# Patient Record
Sex: FEMALE | Race: OTHER | Employment: UNEMPLOYED | URBAN - METROPOLITAN AREA
[De-identification: names, ages, dates, MRNs, and addresses within clinical notes are randomized per-mention and may not be internally consistent; named-entity substitution may affect disease eponyms.]

---

## 2017-10-19 ENCOUNTER — APPOINTMENT (OUTPATIENT)
Dept: GENERAL RADIOLOGY | Age: 77
End: 2017-10-19
Payer: COMMERCIAL

## 2017-10-19 ENCOUNTER — HOSPITAL ENCOUNTER (EMERGENCY)
Age: 77
Discharge: HOME | End: 2017-10-19
Attending: EMERGENCY MEDICINE
Payer: COMMERCIAL

## 2017-10-19 ENCOUNTER — APPOINTMENT (OUTPATIENT)
Dept: CT IMAGING | Age: 77
End: 2017-10-19
Payer: COMMERCIAL

## 2017-10-19 VITALS
WEIGHT: 145 LBS | TEMPERATURE: 98.8 F | RESPIRATION RATE: 16 BRPM | DIASTOLIC BLOOD PRESSURE: 69 MMHG | OXYGEN SATURATION: 99 % | BODY MASS INDEX: 26.52 KG/M2 | SYSTOLIC BLOOD PRESSURE: 138 MMHG | HEART RATE: 112 BPM

## 2017-10-19 DIAGNOSIS — R53.1 GENERAL WEAKNESS: Primary | ICD-10-CM

## 2017-10-19 LAB
ALBUMIN SERPL-MCNC: 4.5 G/DL (ref 3.9–4.9)
ALP BLD-CCNC: 92 U/L (ref 40–130)
ALT SERPL-CCNC: 80 U/L (ref 0–33)
ANION GAP SERPL CALCULATED.3IONS-SCNC: 16 MEQ/L (ref 7–13)
AST SERPL-CCNC: 149 U/L (ref 0–35)
BACTERIA: NORMAL /HPF
BASOPHILS ABSOLUTE: 0.1 K/UL (ref 0–0.2)
BASOPHILS RELATIVE PERCENT: 0.4 %
BILIRUB SERPL-MCNC: 0.6 MG/DL (ref 0–1.2)
BILIRUBIN URINE: NEGATIVE
BLOOD, URINE: NEGATIVE
BUN BLDV-MCNC: 16 MG/DL (ref 8–23)
CALCIUM SERPL-MCNC: 10.2 MG/DL (ref 8.6–10.2)
CHLORIDE BLD-SCNC: 99 MEQ/L (ref 98–107)
CLARITY: CLEAR
CO2: 26 MEQ/L (ref 22–29)
COLOR: YELLOW
CREAT SERPL-MCNC: 0.72 MG/DL (ref 0.5–0.9)
EKG ATRIAL RATE: 98 BPM
EKG P AXIS: 46 DEGREES
EKG P-R INTERVAL: 142 MS
EKG Q-T INTERVAL: 334 MS
EKG QRS DURATION: 80 MS
EKG QTC CALCULATION (BAZETT): 426 MS
EKG R AXIS: -5 DEGREES
EKG T AXIS: 52 DEGREES
EKG VENTRICULAR RATE: 98 BPM
EOSINOPHILS ABSOLUTE: 0 K/UL (ref 0–0.7)
EOSINOPHILS RELATIVE PERCENT: 0.1 %
EPITHELIAL CELLS, UA: NORMAL /HPF
GFR AFRICAN AMERICAN: >60
GFR NON-AFRICAN AMERICAN: >60
GLOBULIN: 3.4 G/DL (ref 2.3–3.5)
GLUCOSE BLD-MCNC: 210 MG/DL (ref 74–109)
GLUCOSE URINE: >=1000 MG/DL
HCT VFR BLD CALC: 46.1 % (ref 37–47)
HEMOGLOBIN: 14.9 G/DL (ref 12–16)
INR BLD: 1
KETONES, URINE: NEGATIVE MG/DL
LEUKOCYTE ESTERASE, URINE: ABNORMAL
LYMPHOCYTES ABSOLUTE: 0.7 K/UL (ref 1–4.8)
LYMPHOCYTES RELATIVE PERCENT: 5.3 %
MCH RBC QN AUTO: 29.7 PG (ref 27–31.3)
MCHC RBC AUTO-ENTMCNC: 32.4 % (ref 33–37)
MCV RBC AUTO: 91.6 FL (ref 82–100)
MONOCYTES ABSOLUTE: 0.9 K/UL (ref 0.2–0.8)
MONOCYTES RELATIVE PERCENT: 7.2 %
NEUTROPHILS ABSOLUTE: 10.9 K/UL (ref 1.4–6.5)
NEUTROPHILS RELATIVE PERCENT: 87 %
NITRITE, URINE: NEGATIVE
PDW BLD-RTO: 13.3 % (ref 11.5–14.5)
PH UA: 6.5 (ref 5–9)
PLATELET # BLD: 152 K/UL (ref 130–400)
POTASSIUM SERPL-SCNC: 4.1 MEQ/L (ref 3.5–5.1)
PROTEIN UA: NEGATIVE MG/DL
PROTHROMBIN TIME: 10.2 SEC (ref 8.1–13.7)
RAPID INFLUENZA  B AGN: NEGATIVE
RAPID INFLUENZA A AGN: NEGATIVE
RBC # BLD: 5.03 M/UL (ref 4.2–5.4)
RBC UA: NORMAL /HPF (ref 0–2)
SODIUM BLD-SCNC: 141 MEQ/L (ref 132–144)
SPECIFIC GRAVITY UA: 1.02 (ref 1–1.03)
TOTAL PROTEIN: 7.9 G/DL (ref 6.4–8.1)
UROBILINOGEN, URINE: 0.2 E.U./DL
WBC # BLD: 12.6 K/UL (ref 4.8–10.8)
WBC UA: NORMAL /HPF (ref 0–5)

## 2017-10-19 PROCEDURE — 85025 COMPLETE CBC W/AUTO DIFF WBC: CPT

## 2017-10-19 PROCEDURE — 86403 PARTICLE AGGLUT ANTBDY SCRN: CPT

## 2017-10-19 PROCEDURE — 93005 ELECTROCARDIOGRAM TRACING: CPT

## 2017-10-19 PROCEDURE — 2580000003 HC RX 258: Performed by: EMERGENCY MEDICINE

## 2017-10-19 PROCEDURE — 51701 INSERT BLADDER CATHETER: CPT

## 2017-10-19 PROCEDURE — 87086 URINE CULTURE/COLONY COUNT: CPT

## 2017-10-19 PROCEDURE — 99284 EMERGENCY DEPT VISIT MOD MDM: CPT

## 2017-10-19 PROCEDURE — 36415 COLL VENOUS BLD VENIPUNCTURE: CPT

## 2017-10-19 PROCEDURE — 85610 PROTHROMBIN TIME: CPT

## 2017-10-19 PROCEDURE — 71020 XR CHEST STANDARD TWO VW: CPT

## 2017-10-19 PROCEDURE — 81001 URINALYSIS AUTO W/SCOPE: CPT

## 2017-10-19 PROCEDURE — 80053 COMPREHEN METABOLIC PANEL: CPT

## 2017-10-19 PROCEDURE — 74176 CT ABD & PELVIS W/O CONTRAST: CPT

## 2017-10-19 RX ORDER — ASPIRIN 81 MG/1
81 TABLET ORAL DAILY
COMMUNITY
End: 2017-11-06 | Stop reason: SDUPTHER

## 2017-10-19 RX ORDER — TIZANIDINE 4 MG/1
4 TABLET ORAL DAILY
COMMUNITY
End: 2017-11-06 | Stop reason: SDUPTHER

## 2017-10-19 RX ORDER — DRONABINOL 2.5 MG/1
2.5 CAPSULE ORAL
COMMUNITY
End: 2017-11-06 | Stop reason: SDUPTHER

## 2017-10-19 RX ORDER — LEVOTHYROXINE SODIUM 0.1 MG/1
100 TABLET ORAL DAILY
Status: ON HOLD | COMMUNITY
End: 2017-11-03 | Stop reason: HOSPADM

## 2017-10-19 RX ORDER — CHOLESTYRAMINE 4 G/9G
1 POWDER, FOR SUSPENSION ORAL 3 TIMES DAILY
COMMUNITY
End: 2017-11-06 | Stop reason: SDUPTHER

## 2017-10-19 RX ORDER — FAMOTIDINE 20 MG/1
20 TABLET, FILM COATED ORAL 2 TIMES DAILY
Status: ON HOLD | COMMUNITY
End: 2017-11-03 | Stop reason: HOSPADM

## 2017-10-19 RX ORDER — DEXLANSOPRAZOLE 60 MG/1
60 CAPSULE, DELAYED RELEASE ORAL DAILY
Status: ON HOLD | COMMUNITY
End: 2017-11-03 | Stop reason: HOSPADM

## 2017-10-19 RX ORDER — ACETAMINOPHEN 160 MG
1 TABLET,DISINTEGRATING ORAL DAILY
COMMUNITY
End: 2017-11-06 | Stop reason: SDUPTHER

## 2017-10-19 RX ORDER — GABAPENTIN 300 MG/1
300 CAPSULE ORAL 3 TIMES DAILY
COMMUNITY
End: 2017-11-06 | Stop reason: SDUPTHER

## 2017-10-19 RX ORDER — BISACODYL 10 MG
10 SUPPOSITORY, RECTAL RECTAL DAILY PRN
COMMUNITY
End: 2017-11-06 | Stop reason: SDUPTHER

## 2017-10-19 RX ORDER — PRAMIPEXOLE DIHYDROCHLORIDE 0.5 MG/1
0.5 TABLET ORAL 3 TIMES DAILY
COMMUNITY
End: 2017-11-06 | Stop reason: SDUPTHER

## 2017-10-19 RX ORDER — MEPERIDINE HYDROCHLORIDE 100 MG/1
100 TABLET ORAL EVERY 4 HOURS PRN
COMMUNITY
End: 2017-11-06 | Stop reason: SDUPTHER

## 2017-10-19 RX ORDER — LACTULOSE 10 G/15ML
20 SOLUTION ORAL; RECTAL 3 TIMES DAILY
COMMUNITY
End: 2017-11-06 | Stop reason: SDUPTHER

## 2017-10-19 RX ORDER — SOLIFENACIN SUCCINATE 10 MG/1
5 TABLET, FILM COATED ORAL DAILY
COMMUNITY
End: 2017-11-06 | Stop reason: SDUPTHER

## 2017-10-19 RX ORDER — SUCRALFATE 1 G/1
1 TABLET ORAL 4 TIMES DAILY
COMMUNITY
End: 2017-11-06 | Stop reason: SDUPTHER

## 2017-10-19 RX ORDER — BUSPIRONE HYDROCHLORIDE 10 MG/1
10 TABLET ORAL DAILY
COMMUNITY
End: 2017-11-06 | Stop reason: SDUPTHER

## 2017-10-19 RX ORDER — 0.9 % SODIUM CHLORIDE 0.9 %
1000 INTRAVENOUS SOLUTION INTRAVENOUS ONCE
Status: COMPLETED | OUTPATIENT
Start: 2017-10-19 | End: 2017-10-19

## 2017-10-19 RX ORDER — ALBUTEROL SULFATE 2.5 MG/3ML
2.5 SOLUTION RESPIRATORY (INHALATION) EVERY 6 HOURS PRN
COMMUNITY
End: 2017-11-06 | Stop reason: SDUPTHER

## 2017-10-19 RX ORDER — DOCUSATE SODIUM 100 MG/1
100 CAPSULE, LIQUID FILLED ORAL DAILY
COMMUNITY
End: 2017-11-06 | Stop reason: SDUPTHER

## 2017-10-19 RX ORDER — OMEPRAZOLE 20 MG/1
40 CAPSULE, DELAYED RELEASE ORAL DAILY
COMMUNITY
End: 2017-11-06 | Stop reason: SDUPTHER

## 2017-10-19 RX ORDER — ALPRAZOLAM 0.5 MG/1
0.5 TABLET ORAL 3 TIMES DAILY PRN
COMMUNITY
End: 2017-11-06 | Stop reason: SDUPTHER

## 2017-10-19 RX ORDER — ALBUTEROL SULFATE 90 UG/1
2 AEROSOL, METERED RESPIRATORY (INHALATION) EVERY 4 HOURS PRN
COMMUNITY
End: 2017-11-06 | Stop reason: SDUPTHER

## 2017-10-19 RX ADMIN — SODIUM CHLORIDE 1000 ML: 9 INJECTION, SOLUTION INTRAVENOUS at 12:36

## 2017-10-19 ASSESSMENT — ENCOUNTER SYMPTOMS
VOMITING: 0
ABDOMINAL PAIN: 0
DIARRHEA: 0
SHORTNESS OF BREATH: 0
SORE THROAT: 0
BACK PAIN: 0
NAUSEA: 0

## 2017-10-19 NOTE — ED NOTES
Bed: 20  Expected date: 10/19/17  Expected time: 11:45 AM  Means of arrival:   Comments:  77 f pain all over, Telugu speaking     Carly Hopper RN  10/19/17 [id number]

## 2017-10-19 NOTE — CARE COORDINATION
DISCUSSED WITH SON WHO IS ENGLISH-SPEAKING, THAT  HAD ORDERED C. PT IS CURRENTLY RECEIVING CARE THROUGH [facility]. NO ANSWER WHEN I CALLED THE [facility] OFFICE, WILL FAX CHART WITH ORDER.

## 2017-10-19 NOTE — ED NOTES
I spoke with the patient to see what brought her in to the ER. The patient is French speaking only and whispers her answers. Patient is alert and is able to give me her date of birth and explain her complaint. Patient states she has not been out of bed since Friday and states she has chronic leg pain and that it has increased over the last couple of days. The patients  and another family member walked in and I was able to speak with them, who is also French speaking. The  states that on Oct 12, 2017, they took the patient to her family doctor for an appointment and she was prescribed a new parkinsons medication. The  states the patient began the new medication on Oct 13th, and he stopped giving it to her on Oct. 16th due to the patient not being able to get out of bed, increased tremors and the way the patient was whispering. He did state that she has not been out of bed since Friday like the patient initially said.       Silvia Wallace RN  10/19/17 [id number]

## 2017-10-19 NOTE — ED PROVIDER NOTES
Negative for rash. Neurological: Negative for dizziness, weakness and headaches. Hematological: Negative for adenopathy. Psychiatric/Behavioral: Negative for confusion. All other systems reviewed and are negative. Except as noted above the remainder of the review of systems was reviewed and negative. PAST MEDICAL HISTORY     Past Medical History:   Diagnosis Date    Depression     Fatigue     Fibromyalgia     Hyperlipidemia     Hypertension     Hypothyroid     Levodopa-induced dyskinesia     Lumbago     Muscular wasting and disuse atrophy     Myalgia     Osteoarthritis     Paralysis agitans ([address]     Parkinson's disease ([address]     Spinal stenosis     Thyroid disease     Urinary frequency        SURGICAL HISTORY     History reviewed. No pertinent surgical history.     CURRENT MEDICATIONS       Discharge Medication List as of 10/19/2017  5:53 PM      CONTINUE these medications which have NOT CHANGED    Details   ALPRAZolam (XANAX) 0.5 MG tablet Take 0.5 mg by mouth 3 times daily as needed for SleepHistorical Med      CALCIUM CARBONATE PO Take by mouthHistorical Med      busPIRone (BUSPAR) 10 MG tablet Take 10 mg by mouth dailyHistorical Med      gabapentin (NEURONTIN) 300 MG capsule Take 300 mg by mouth 3 times dailyHistorical Med      tiZANidine (ZANAFLEX) 4 MG tablet Take 4 mg by mouth dailyHistorical Med      levothyroxine (SYNTHROID) 100 MCG tablet Take 100 mcg by mouth dailyHistorical Med      docusate sodium (COLACE) 100 MG capsule Take 100 mg by mouth dailyHistorical Med      diltiazem (CARDIZEM) 30 MG tablet Take 30 mg by mouth 3 times dailyHistorical Med      albuterol sulfate  (90 Base) MCG/ACT inhaler Inhale 2 puffs into the lungs every 4 hours as needed for WheezingHistorical Med      albuterol (PROVENTIL) (2.5 MG/3ML) 0.083% nebulizer solution Take 2.5 mg by nebulization every 6 hours as needed for WheezingHistorical Med      pramipexole (MIRAPEX) 0.5 MG tablet Take activity: Not Asked     Other Topics Concern    None     Social History Narrative    None       SCREENINGS           PHYSICAL EXAM    (up to 7 for level 4, 8 or more for level 5)     ED Triage Vitals [10/19/17 1200]   BP Temp Temp Source Pulse Resp SpO2 Height Weight   138/69 98.8 °F (37.1 °C) Temporal 112 16 99 % -- 145 lb (65.8 kg)       Physical Exam   Constitutional: She is oriented to person, place, and time. She appears well-developed and well-nourished. No distress. HENT:   Head: Normocephalic and atraumatic. Nose: Nose normal.   Mouth/Throat: No oropharyngeal exudate. Eyes: Conjunctivae are normal. Pupils are equal, round, and reactive to light. Right eye exhibits no discharge. Left eye exhibits no discharge. Neck: Normal range of motion. Neck supple. Cardiovascular: Normal rate, regular rhythm and normal heart sounds. Exam reveals no friction rub. No murmur heard. Pulmonary/Chest: Effort normal and breath sounds normal. No stridor. No respiratory distress. She has no wheezes. Abdominal: Soft. Bowel sounds are normal. She exhibits no distension. There is no rebound and no guarding. Musculoskeletal: Normal range of motion. She exhibits no edema or tenderness. Lymphadenopathy:     She has no cervical adenopathy. Neurological: She is alert and oriented to person, place, and time. She displays normal reflexes. No cranial nerve deficit. Coordination normal.   Skin: Skin is warm and dry. No rash noted. Psychiatric: She has a normal mood and affect. Her behavior is normal. Thought content normal.   Nursing note and vitals reviewed. DIAGNOSTIC RESULTS     EKG: All EKG's are interpreted by the Emergency Department Physician who either signs or Co-signs this chart in the absence of a cardiologist.    Sinus rhythm at 98. Computer reads as acute MI but I disagree. No ST elevation. Normal CT interval.  No acute changes.     RADIOLOGY:   Non-plain film images such as CT, Ultrasound and TempSrc: Temporal   SpO2: 99%   Weight: 145 lb (65.8 kg)       MDM  Number of Diagnoses or Management Options  General weakness:     Labs are basically within normal limits except for mild leukocytosis. Chest x-ray is clear. CT abdomen pelvis negative for acute process. Urine does not look infected. She was given IV fluids and observed. I spoke at length with the patient's family using interpretation service as well as with her son who is fluent in [place]. She feels much better. She feels strongly about going home and wants to be in her own house. I discussed the case with the neurologist that was on call for her neurologist who will help arrange close follow-up for possible medication adjustment. She will return to the emergency department if she has any symptoms. CRITICAL CARE TIME     Total Critical Care time (not applicable if blank)     Total minutes, excluding separately reportable procedures. There was a high probability of clinically significant/life threatening deterioration in the patient's condition which required my urgent intervention. This includes discussing the case with consultants, reviewing laboratory studies and images independently, arranging disposition, and speaking with patient/family    CONSULTS:  None    PROCEDURES:  Unless otherwise noted below, none     Procedures    FINAL IMPRESSION      1.  General weakness          DISPOSITION/PLAN   DISPOSITION Decision to Discharge    PATIENT REFERRED TO:  Thomas Real DO  [facility] [phone]      ASAP      DISCHARGE MEDICATIONS:  Discharge Medication List as of 10/19/2017  5:53 PM             (Please note that portions of this note were completed with a voice recognition program.  Efforts were made to edit the dictations but occasionally words and phrases are mis-transcribed.)    Naomi Mayer MD (electronically signed)  Attending Emergency Physician              Emelia Guerra,

## 2017-10-20 PROCEDURE — 93010 ELECTROCARDIOGRAM REPORT: CPT | Performed by: INTERNAL MEDICINE

## 2017-10-21 LAB — URINE CULTURE, ROUTINE: NORMAL

## 2017-10-26 ENCOUNTER — APPOINTMENT (OUTPATIENT)
Dept: CT IMAGING | Age: 77
DRG: 871 | End: 2017-10-26
Payer: COMMERCIAL

## 2017-10-26 ENCOUNTER — APPOINTMENT (OUTPATIENT)
Dept: GENERAL RADIOLOGY | Age: 77
DRG: 871 | End: 2017-10-26
Payer: COMMERCIAL

## 2017-10-26 ENCOUNTER — HOSPITAL ENCOUNTER (INPATIENT)
Age: 77
LOS: 8 days | Discharge: NURSING, REHAB OR LONG-TERM CARE | DRG: 871 | End: 2017-11-03
Attending: INTERNAL MEDICINE | Admitting: INTERNAL MEDICINE
Payer: COMMERCIAL

## 2017-10-26 ENCOUNTER — APPOINTMENT (OUTPATIENT)
Dept: ULTRASOUND IMAGING | Age: 77
DRG: 871 | End: 2017-10-26
Payer: COMMERCIAL

## 2017-10-26 DIAGNOSIS — N17.9 ACUTE RENAL FAILURE, UNSPECIFIED ACUTE RENAL FAILURE TYPE (HCC): ICD-10-CM

## 2017-10-26 DIAGNOSIS — R33.9 URINARY RETENTION: ICD-10-CM

## 2017-10-26 DIAGNOSIS — A41.9 SEPSIS, DUE TO UNSPECIFIED ORGANISM: ICD-10-CM

## 2017-10-26 DIAGNOSIS — R41.82 ALTERED MENTAL STATUS, UNSPECIFIED ALTERED MENTAL STATUS TYPE: Primary | ICD-10-CM

## 2017-10-26 LAB
ALBUMIN SERPL-MCNC: 3.8 G/DL (ref 3.9–4.9)
ALP BLD-CCNC: 157 U/L (ref 40–130)
ALT SERPL-CCNC: 24 U/L (ref 0–33)
ANION GAP SERPL CALCULATED.3IONS-SCNC: 24 MEQ/L (ref 7–13)
AST SERPL-CCNC: 22 U/L (ref 0–35)
BACTERIA: ABNORMAL /HPF
BASE EXCESS ARTERIAL: 0 (ref -3–3)
BASOPHILS ABSOLUTE: 0.1 K/UL (ref 0–0.2)
BASOPHILS RELATIVE PERCENT: 0.3 %
BILIRUB SERPL-MCNC: 0.4 MG/DL (ref 0–1.2)
BILIRUBIN URINE: NEGATIVE
BLOOD, URINE: NEGATIVE
BUN BLDV-MCNC: 123 MG/DL (ref 8–23)
CALCIUM SERPL-MCNC: 10.7 MG/DL (ref 8.6–10.2)
CHLORIDE BLD-SCNC: 98 MEQ/L (ref 98–107)
CLARITY: ABNORMAL
CO2: 21 MEQ/L (ref 22–29)
COLOR: YELLOW
CREAT SERPL-MCNC: 6.87 MG/DL (ref 0.5–0.9)
EOSINOPHILS ABSOLUTE: 0 K/UL (ref 0–0.7)
EOSINOPHILS RELATIVE PERCENT: 0 %
EPITHELIAL CELLS, UA: ABNORMAL /HPF
GFR AFRICAN AMERICAN: 6
GFR AFRICAN AMERICAN: 7.1
GFR NON-AFRICAN AMERICAN: 5
GFR NON-AFRICAN AMERICAN: 5.8
GLOBULIN: 3.7 G/DL (ref 2.3–3.5)
GLUCOSE BLD-MCNC: 179 MG/DL (ref 74–109)
GLUCOSE URINE: NEGATIVE MG/DL
HCO3 ARTERIAL: 24.7 MMOL/L (ref 21–29)
HCT VFR BLD CALC: 42.9 % (ref 37–47)
HEMOGLOBIN: 13.8 G/DL (ref 12–16)
INR BLD: 1
KETONES, URINE: NEGATIVE MG/DL
LACTATE: 1.09 MMOL/L (ref 0.4–2)
LACTIC ACID: 1.3 MMOL/L (ref 0.5–2.2)
LEUKOCYTE ESTERASE, URINE: ABNORMAL
LYMPHOCYTES ABSOLUTE: 1 K/UL (ref 1–4.8)
LYMPHOCYTES RELATIVE PERCENT: 5.5 %
MCH RBC QN AUTO: 29.2 PG (ref 27–31.3)
MCHC RBC AUTO-ENTMCNC: 32.2 % (ref 33–37)
MCV RBC AUTO: 90.8 FL (ref 82–100)
MONOCYTES ABSOLUTE: 1.5 K/UL (ref 0.2–0.8)
MONOCYTES RELATIVE PERCENT: 8 %
NEUTROPHILS ABSOLUTE: 16.2 K/UL (ref 1.4–6.5)
NEUTROPHILS RELATIVE PERCENT: 86.2 %
NITRITE, URINE: NEGATIVE
O2 SAT, ARTERIAL: 94 % (ref 93–100)
PCO2 ARTERIAL: 38 MM HG (ref 35–45)
PDW BLD-RTO: 13.5 % (ref 11.5–14.5)
PERFORMED ON: ABNORMAL
PERFORMED ON: ABNORMAL
PH ARTERIAL: 7.42 (ref 7.35–7.45)
PH UA: 5.5 (ref 5–9)
PLATELET # BLD: 209 K/UL (ref 130–400)
PO2 ARTERIAL: 68 MM HG (ref 75–108)
POC CREATININE WHOLE BLOOD: 7.4
POC CREATININE: 7.4 MG/DL (ref 0.6–1.2)
POC SAMPLE TYPE: ABNORMAL
POC SAMPLE TYPE: ABNORMAL
POTASSIUM SERPL-SCNC: 5.6 MEQ/L (ref 3.5–5.1)
PROTEIN UA: NEGATIVE MG/DL
PROTHROMBIN TIME: 10.9 SEC (ref 8.1–13.7)
RBC # BLD: 4.73 M/UL (ref 4.2–5.4)
RBC UA: ABNORMAL /HPF (ref 0–2)
SODIUM BLD-SCNC: 143 MEQ/L (ref 132–144)
SPECIFIC GRAVITY UA: 1.02 (ref 1–1.03)
TCO2 ARTERIAL: 26 (ref 22–29)
TOTAL CK: 38 U/L (ref 0–170)
TOTAL PROTEIN: 7.5 G/DL (ref 6.4–8.1)
TROPONIN: <0.01 NG/ML (ref 0–0.01)
UROBILINOGEN, URINE: 0.2 E.U./DL
WBC # BLD: 18.7 K/UL (ref 4.8–10.8)
WBC UA: ABNORMAL /HPF (ref 0–5)

## 2017-10-26 PROCEDURE — 6360000002 HC RX W HCPCS: Performed by: PHYSICIAN ASSISTANT

## 2017-10-26 PROCEDURE — 6360000002 HC RX W HCPCS: Performed by: NURSE PRACTITIONER

## 2017-10-26 PROCEDURE — 85025 COMPLETE CBC W/AUTO DIFF WBC: CPT

## 2017-10-26 PROCEDURE — 87040 BLOOD CULTURE FOR BACTERIA: CPT

## 2017-10-26 PROCEDURE — 80053 COMPREHEN METABOLIC PANEL: CPT

## 2017-10-26 PROCEDURE — 76775 US EXAM ABDO BACK WALL LIM: CPT

## 2017-10-26 PROCEDURE — 96365 THER/PROPH/DIAG IV INF INIT: CPT

## 2017-10-26 PROCEDURE — 99285 EMERGENCY DEPT VISIT HI MDM: CPT

## 2017-10-26 PROCEDURE — 82803 BLOOD GASES ANY COMBINATION: CPT

## 2017-10-26 PROCEDURE — 1210000000 HC MED SURG R&B

## 2017-10-26 PROCEDURE — 84484 ASSAY OF TROPONIN QUANT: CPT

## 2017-10-26 PROCEDURE — 70450 CT HEAD/BRAIN W/O DYE: CPT

## 2017-10-26 PROCEDURE — 2580000003 HC RX 258: Performed by: NURSE PRACTITIONER

## 2017-10-26 PROCEDURE — 83605 ASSAY OF LACTIC ACID: CPT

## 2017-10-26 PROCEDURE — 93005 ELECTROCARDIOGRAM TRACING: CPT

## 2017-10-26 PROCEDURE — 2580000003 HC RX 258: Performed by: PHYSICIAN ASSISTANT

## 2017-10-26 PROCEDURE — 96368 THER/DIAG CONCURRENT INF: CPT

## 2017-10-26 PROCEDURE — 85610 PROTHROMBIN TIME: CPT

## 2017-10-26 PROCEDURE — 81001 URINALYSIS AUTO W/SCOPE: CPT

## 2017-10-26 PROCEDURE — 36600 WITHDRAWAL OF ARTERIAL BLOOD: CPT

## 2017-10-26 PROCEDURE — 96375 TX/PRO/DX INJ NEW DRUG ADDON: CPT

## 2017-10-26 PROCEDURE — 71010 XR CHEST PORTABLE: CPT

## 2017-10-26 PROCEDURE — 36415 COLL VENOUS BLD VENIPUNCTURE: CPT

## 2017-10-26 PROCEDURE — 82550 ASSAY OF CK (CPK): CPT

## 2017-10-26 PROCEDURE — 82565 ASSAY OF CREATININE: CPT

## 2017-10-26 PROCEDURE — 6370000000 HC RX 637 (ALT 250 FOR IP): Performed by: NURSE PRACTITIONER

## 2017-10-26 PROCEDURE — 74176 CT ABD & PELVIS W/O CONTRAST: CPT

## 2017-10-26 RX ORDER — SODIUM CHLORIDE 0.9 % (FLUSH) 0.9 %
10 SYRINGE (ML) INJECTION PRN
Status: DISCONTINUED | OUTPATIENT
Start: 2017-10-26 | End: 2017-11-03 | Stop reason: HOSPADM

## 2017-10-26 RX ORDER — DEXTROSE MONOHYDRATE 25 G/50ML
25 INJECTION, SOLUTION INTRAVENOUS ONCE
Status: COMPLETED | OUTPATIENT
Start: 2017-10-26 | End: 2017-10-26

## 2017-10-26 RX ORDER — SODIUM CHLORIDE 0.9 % (FLUSH) 0.9 %
10 SYRINGE (ML) INJECTION EVERY 12 HOURS SCHEDULED
Status: DISCONTINUED | OUTPATIENT
Start: 2017-10-26 | End: 2017-11-03 | Stop reason: HOSPADM

## 2017-10-26 RX ORDER — CIPROFLOXACIN 2 MG/ML
400 INJECTION, SOLUTION INTRAVENOUS EVERY 24 HOURS
Status: DISCONTINUED | OUTPATIENT
Start: 2017-10-26 | End: 2017-10-28

## 2017-10-26 RX ORDER — LEVOTHYROXINE SODIUM 0.1 MG/1
100 TABLET ORAL DAILY
Status: DISCONTINUED | OUTPATIENT
Start: 2017-10-26 | End: 2017-10-28

## 2017-10-26 RX ORDER — ACETAMINOPHEN 325 MG/1
650 TABLET ORAL EVERY 4 HOURS PRN
Status: DISCONTINUED | OUTPATIENT
Start: 2017-10-26 | End: 2017-11-03 | Stop reason: HOSPADM

## 2017-10-26 RX ORDER — SODIUM CHLORIDE 9 MG/ML
INJECTION, SOLUTION INTRAVENOUS CONTINUOUS
Status: DISCONTINUED | OUTPATIENT
Start: 2017-10-26 | End: 2017-10-27

## 2017-10-26 RX ORDER — ASPIRIN 81 MG/1
81 TABLET ORAL DAILY
Status: DISCONTINUED | OUTPATIENT
Start: 2017-10-26 | End: 2017-11-03 | Stop reason: HOSPADM

## 2017-10-26 RX ORDER — ACETAMINOPHEN 650 MG/1
650 SUPPOSITORY RECTAL ONCE
Status: COMPLETED | OUTPATIENT
Start: 2017-10-26 | End: 2017-10-26

## 2017-10-26 RX ORDER — 0.9 % SODIUM CHLORIDE 0.9 %
30 INTRAVENOUS SOLUTION INTRAVENOUS ONCE
Status: COMPLETED | OUTPATIENT
Start: 2017-10-26 | End: 2017-10-26

## 2017-10-26 RX ORDER — SODIUM CHLORIDE 9 MG/ML
INJECTION, SOLUTION INTRAVENOUS CONTINUOUS
Status: DISCONTINUED | OUTPATIENT
Start: 2017-10-26 | End: 2017-10-26

## 2017-10-26 RX ORDER — PAROXETINE HYDROCHLORIDE 40 MG/1
40 TABLET, FILM COATED ORAL EVERY MORNING
COMMUNITY
End: 2017-11-06 | Stop reason: SDUPTHER

## 2017-10-26 RX ORDER — ONDANSETRON 2 MG/ML
4 INJECTION INTRAMUSCULAR; INTRAVENOUS EVERY 6 HOURS PRN
Status: DISCONTINUED | OUTPATIENT
Start: 2017-10-26 | End: 2017-11-03 | Stop reason: HOSPADM

## 2017-10-26 RX ORDER — PAROXETINE HYDROCHLORIDE 20 MG/1
40 TABLET, FILM COATED ORAL EVERY MORNING
Status: DISCONTINUED | OUTPATIENT
Start: 2017-10-27 | End: 2017-11-03 | Stop reason: HOSPADM

## 2017-10-26 RX ADMIN — INSULIN HUMAN 10 UNITS: 100 INJECTION, SOLUTION PARENTERAL at 14:50

## 2017-10-26 RX ADMIN — SODIUM CHLORIDE 2040 ML: 9 INJECTION, SOLUTION INTRAVENOUS at 12:57

## 2017-10-26 RX ADMIN — SODIUM CHLORIDE: 9 INJECTION, SOLUTION INTRAVENOUS at 16:36

## 2017-10-26 RX ADMIN — AZITHROMYCIN MONOHYDRATE 500 MG: 500 INJECTION, POWDER, LYOPHILIZED, FOR SOLUTION INTRAVENOUS at 14:32

## 2017-10-26 RX ADMIN — CEFTRIAXONE 1 G: 1 INJECTION, POWDER, FOR SOLUTION INTRAMUSCULAR; INTRAVENOUS at 12:57

## 2017-10-26 RX ADMIN — CALCIUM GLUCONATE 1 G: 94 INJECTION, SOLUTION INTRAVENOUS at 14:50

## 2017-10-26 RX ADMIN — ACETAMINOPHEN 650 MG: 650 SUPPOSITORY RECTAL at 13:09

## 2017-10-26 RX ADMIN — DEXTROSE MONOHYDRATE 25 G: 25 INJECTION, SOLUTION INTRAVENOUS at 14:49

## 2017-10-26 RX ADMIN — CIPROFLOXACIN 400 MG: 2 INJECTION, SOLUTION INTRAVENOUS at 20:23

## 2017-10-26 ASSESSMENT — PAIN SCALES - GENERAL
PAINLEVEL_OUTOF10: 0
PAINLEVEL_OUTOF10: 0

## 2017-10-26 NOTE — H&P
(COLACE) 100 MG capsule Take 100 mg by mouth daily   Yes Historical Provider, MD   diltiazem (CARDIZEM) 30 MG tablet Take 30 mg by mouth 3 times daily   Yes Historical Provider, MD   solifenacin (VESICARE) 10 MG tablet Take 5 mg by mouth daily   Yes Historical Provider, MD   dexlansoprazole (DEXILANT) 60 MG CPDR delayed release capsule Take 60 mg by mouth daily   Yes Historical Provider, MD   linaclotide (LINZESS) 290 MCG CAPS capsule Take 290 mcg by mouth every morning (before breakfast)   Yes Historical Provider, MD   aspirin 81 MG EC tablet Take 81 mg by mouth daily   Yes Historical Provider, MD   Cholecalciferol (VITAMIN D3) 2000 units CAPS Take 1 capsule by mouth daily   Yes Historical Provider, MD   carbidopa-levodopa (SINEMET)  MG per tablet Take 1 tablet by mouth 3 times daily   Yes Historical Provider, MD   busPIRone (BUSPAR) 10 MG tablet Take 10 mg by mouth daily    Historical Provider, MD   albuterol sulfate  (90 Base) MCG/ACT inhaler Inhale 2 puffs into the lungs every 4 hours as needed for Wheezing    Historical Provider, MD   albuterol (PROVENTIL) (2.5 MG/3ML) 0.083% nebulizer solution Take 2.5 mg by nebulization every 6 hours as needed for Wheezing    Historical Provider, MD   pramipexole (MIRAPEX) 0.5 MG tablet Take 0.5 mg by mouth 3 times daily     Historical Provider, MD   bisacodyl (DULCOLAX) 10 MG suppository Place 10 mg rectally daily as needed for Constipation    Historical Provider, MD   omeprazole (PRILOSEC) 20 MG delayed release capsule Take 40 mg by mouth daily    Historical Provider, MD   sucralfate (CARAFATE) 1 GM tablet Take 1 g by mouth 4 times daily    Historical Provider, MD   meperidine (DEMEROL) 100 MG tablet Take 100 mg by mouth every 4 hours as needed for Pain .     Historical Provider, MD   dronabinol (MARINOL) 2.5 MG capsule Take 2.5 mg by mouth 2 times daily (before meals)    Historical Provider, MD   cholestyramine (QUESTRAN) 4 g packet Take 1 packet by mouth 3 PLT  209     Recent Labs      10/26/17   1237  10/26/17   1320   NA  143   --    K  5.6*   --    CL  98   --    CO2  21*   --    BUN  123*   --    CREATININE  6.87*  7.4*   CALCIUM  10.7*   --      Recent Labs      10/26/17   1237   AST  22   ALT  24   BILITOT  0.4   ALKPHOS  157*     Recent Labs      10/26/17   1237   INR  1.0     Recent Labs      10/26/17   1237   CKTOTAL  38   TROPONINI  <0.010       Urinalysis:      Lab Results   Component Value Date    NITRU Negative 10/26/2017    WBCUA 6-10 10/26/2017    BACTERIA Moderate 10/26/2017    RBCUA 3-5 10/26/2017    BLOODU Negative 10/26/2017    SPECGRAV 1.018 10/26/2017    GLUCOSEU Negative 10/26/2017       Radiology:     CXR: I have reviewed the CXR with the following interpretation: pending  EKG:  I have reviewed the EKG with the following interpretation: pending    XR Chest Portable   Preliminary Result   SHALLOW INSPIRATORY PORTABLE CHEST RADIOGRAPH. MODERATE RIGHT HEMIDIAPHRAGMATIC ELEVATION AND RIGHT BASILAR ATELECTASIS. CT ABDOMEN PELVIS WO IV CONTRAST Additional Contrast? None   Final Result      Right lower lobe pneumonia with left lower lobe subsegmental atelectasis/pneumonia. Small to moderate hiatal hernia. Colonic diverticulosis with no CT evidence diverticulitis. Air within urinary bladder most likely secondary to instrumentation/Trevizo catheter placement. All CT scans at this facility use dose modulation, iterative reconstruction, and/or weight based dosing when appropriate to reduce radiation dose to as low as reasonably achievable. CT head without contrast   Final Result      NO ACUTE INTRACRANIAL PROCESS, OR SIGNIFICANT CHANGE FROM 9/21/2015 IDENTIFIED. ASSESSMENT:    There are no active hospital problems to display for this patient. PLAN:        DVT Prophylaxis: lovenox  Diet:    Code Status: No Order    PT/OT Eval Status: eval and tx    1.  Altered mental status-2 to infectious

## 2017-10-26 NOTE — ED TRIAGE NOTES
Pt sent in from home by visiting physicians for fever, ams, and possible urine retention, pt is drowsy , arouses easily to verbal stimuli, oriented x self, breathes are equal and unlabored, family at bedside.

## 2017-10-26 NOTE — ED PROVIDER NOTES
every 4 hours as needed for Wheezing    ALPRAZOLAM (XANAX) 0.5 MG TABLET    Take 0.5 mg by mouth 3 times daily as needed for Sleep    ASPIRIN 81 MG EC TABLET    Take 81 mg by mouth daily    BISACODYL (DULCOLAX) 10 MG SUPPOSITORY    Place 10 mg rectally daily as needed for Constipation    BUSPIRONE (BUSPAR) 10 MG TABLET    Take 10 mg by mouth daily    CALCIUM CARBONATE PO    Take by mouth    CARBIDOPA-LEVODOPA (SINEMET)  MG PER TABLET    Take 1 tablet by mouth 3 times daily    CHOLECALCIFEROL (VITAMIN D3) 2000 UNITS CAPS    Take 1 capsule by mouth daily    CHOLESTYRAMINE (QUESTRAN) 4 G PACKET    Take 1 packet by mouth 3 times daily    DEXLANSOPRAZOLE (DEXILANT) 60 MG CPDR DELAYED RELEASE CAPSULE    Take 60 mg by mouth daily    DILTIAZEM (CARDIZEM) 30 MG TABLET    Take 30 mg by mouth 3 times daily    DOCUSATE SODIUM (COLACE) 100 MG CAPSULE    Take 100 mg by mouth daily    DRONABINOL (MARINOL) 2.5 MG CAPSULE    Take 2.5 mg by mouth 2 times daily (before meals)    FAMOTIDINE (PEPCID) 20 MG TABLET    Take 20 mg by mouth 2 times daily    GABAPENTIN (NEURONTIN) 300 MG CAPSULE    Take 300 mg by mouth 3 times daily    LACTULOSE ENCEPHALOPATHY 10 GM/15ML SOLN SOLUTION    Take 20 g by mouth 3 times daily    LEVOTHYROXINE (SYNTHROID) 100 MCG TABLET    Take 100 mcg by mouth daily    LINACLOTIDE (LINZESS) 290 MCG CAPS CAPSULE    Take 290 mcg by mouth every morning (before breakfast)    MEPERIDINE (DEMEROL) 100 MG TABLET    Take 100 mg by mouth every 4 hours as needed for Pain .     NALOXEGOL (MOVANTIK) 25 MG TABS TABLET    Take 25 mg by mouth every morning    OMEPRAZOLE (PRILOSEC) 20 MG DELAYED RELEASE CAPSULE    Take 40 mg by mouth daily    PAROXETINE (PAXIL) 40 MG TABLET    Take 40 mg by mouth every morning    PRAMIPEXOLE (MIRAPEX) 0.5 MG TABLET    Take 0.5 mg by mouth 3 times daily     SOLIFENACIN (VESICARE) 10 MG TABLET    Take 5 mg by mouth daily    SUCRALFATE (CARAFATE) 1 GM TABLET    Take 1 g by mouth 4 times daily TIZANIDINE (ZANAFLEX) 4 MG TABLET    Take 4 mg by mouth daily       ALLERGIES     Latex; Penicillins; and Vancomycin    FAMILY HISTORY     History reviewed. No pertinent family history. SOCIAL HISTORY       Social History     Social History    Marital status:      Spouse name: N/A    Number of children: N/A    Years of education: N/A     Social History Main Topics    Smoking status: Never Smoker    Smokeless tobacco: Never Used    Alcohol use No    Drug use: No    Sexual activity: Not Asked     Other Topics Concern    None     Social History Narrative    None       SCREENINGS             PHYSICAL EXAM    (up to 7 for level 4, 8 or more for level 5)     ED Triage Vitals Sinus tachycardia    BP Temp Temp Source Pulse Resp SpO2 Height Weight   (!) 134/92 101.8 °F (38.8 °C) Oral 115 17 93 % -- 150 lb (68 kg)       Physical Exam   Constitutional: She appears well-developed and well-nourished. She appears listless. HENT:   Head: Normocephalic and atraumatic. Eyes: Conjunctivae are normal.   Neck: Neck supple. Cardiovascular: Regular rhythm, normal heart sounds and intact distal pulses. Tachycardia present. Abdominal: There is tenderness. Patient is responsive to voice but does not answer questions, she does grimace on examination of abdomen. Positive for distended bladder. Neurological: She appears listless. Skin: Skin is warm and dry. DIAGNOSTIC RESULTS     EKG: All EKG's are interpreted by the Emergency Department Physician who either signs or Co-signs this chart in the absence of a cardiologist.    Sinus tachycardia at 119 bpm.  No acute ST segment elevation. RADIOLOGY:    Non-plain film images such as CT, Ultrasound and MRI are read by the radiologist. Plain radiographic images are visualized and preliminarily interpreted by the emergency physician with the below findings:    Chest X-Ray demonstrates no acute infiltrate, effusion or pneumothorax.     Interpretation per the Radiologist below, if available at the time of this note:    CT ABDOMEN PELVIS WO IV CONTRAST Additional Contrast? None   Final Result      Right lower lobe pneumonia with left lower lobe subsegmental atelectasis/pneumonia. Small to moderate hiatal hernia. Colonic diverticulosis with no CT evidence diverticulitis. Air within urinary bladder most likely secondary to instrumentation/Trevizo catheter placement. All CT scans at this facility use dose modulation, iterative reconstruction, and/or weight based dosing when appropriate to reduce radiation dose to as low as reasonably achievable. CT head without contrast   Final Result      NO ACUTE INTRACRANIAL PROCESS, OR SIGNIFICANT CHANGE FROM 9/21/2015 IDENTIFIED.                   XR Chest Portable    (Results Pending)         ED BEDSIDE ULTRASOUND:   Performed by ED Physician - none    LABS:  Labs Reviewed   CBC WITH AUTO DIFFERENTIAL - Abnormal; Notable for the following:        Result Value    WBC 18.7 (*)     MCHC 32.2 (*)     Neutrophils # 16.2 (*)     Monocytes # 1.5 (*)     All other components within normal limits   COMPREHENSIVE METABOLIC PANEL - Abnormal; Notable for the following:     Potassium 5.6 (*)     CO2 21 (*)     Anion Gap 24 (*)     Glucose 179 (*)      (*)     CREATININE 6.87 (*)     GFR Non- 5.8 (*)     GFR  7.1 (*)     Calcium 10.7 (*)     Alb 3.8 (*)     Alkaline Phosphatase 157 (*)     Globulin 3.7 (*)     All other components within normal limits    Narrative:     CALL  [facility] tel. [id number],  Creat called to Dr. Yasmany Zuñiga, 10/26/2017 13:16, by Dionne Standing  BUN called to Dr. Yasmany Zuñiga, 10/26/2017 13:16, by Dionne Standing   URINALYSIS - Abnormal; Notable for the following:     Clarity, UA CLOUDY (*)     Leukocyte Esterase, Urine TRACE (*)     All other components within normal limits   MICROSCOPIC URINALYSIS - Abnormal; Notable for the following:     WBC, UA 6-10 (*)     RBC, UA 3-5

## 2017-10-26 NOTE — ED NOTES
Bed: 03  Expected date: 10/26/17  Expected time: 11:59 AM  Means of arrival: [facility]  Comments:  78year old female urinary retention.      Fany Ott RN  10/26/17 [id number]

## 2017-10-27 PROBLEM — R33.9 URINARY RETENTION: Status: ACTIVE | Noted: 2017-10-27

## 2017-10-27 PROBLEM — R73.9 HYPERGLYCEMIA: Status: ACTIVE | Noted: 2017-10-27

## 2017-10-27 LAB
ANION GAP SERPL CALCULATED.3IONS-SCNC: 15 MEQ/L (ref 7–13)
BASOPHILS ABSOLUTE: 0.1 K/UL (ref 0–0.2)
BASOPHILS RELATIVE PERCENT: 0.7 %
BUN BLDV-MCNC: 86 MG/DL (ref 8–23)
CALCIUM SERPL-MCNC: 9.7 MG/DL (ref 8.6–10.2)
CHLORIDE BLD-SCNC: 107 MEQ/L (ref 98–107)
CO2: 24 MEQ/L (ref 22–29)
CREAT SERPL-MCNC: 2.73 MG/DL (ref 0.5–0.9)
EOSINOPHILS ABSOLUTE: 0.1 K/UL (ref 0–0.7)
EOSINOPHILS RELATIVE PERCENT: 0.8 %
GFR AFRICAN AMERICAN: 20.5
GFR NON-AFRICAN AMERICAN: 16.9
GLUCOSE BLD-MCNC: 122 MG/DL (ref 60–115)
GLUCOSE BLD-MCNC: 128 MG/DL (ref 74–109)
HBA1C MFR BLD: 5.7 % (ref 4.8–5.9)
HCT VFR BLD CALC: 36.5 % (ref 37–47)
HEMOGLOBIN: 11.9 G/DL (ref 12–16)
LACTIC ACID: 0.8 MMOL/L (ref 0.5–2.2)
LYMPHOCYTES ABSOLUTE: 0.6 K/UL (ref 1–4.8)
LYMPHOCYTES RELATIVE PERCENT: 6 %
MCH RBC QN AUTO: 29.9 PG (ref 27–31.3)
MCHC RBC AUTO-ENTMCNC: 32.5 % (ref 33–37)
MCV RBC AUTO: 92 FL (ref 82–100)
MONOCYTES ABSOLUTE: 0.8 K/UL (ref 0.2–0.8)
MONOCYTES RELATIVE PERCENT: 8 %
NEUTROPHILS ABSOLUTE: 8.9 K/UL (ref 1.4–6.5)
NEUTROPHILS RELATIVE PERCENT: 84.5 %
OSMOLALITY: 340 MOSM/KG (ref 280–303)
PDW BLD-RTO: 13.5 % (ref 11.5–14.5)
PERFORMED ON: ABNORMAL
PLATELET # BLD: 175 K/UL (ref 130–400)
POTASSIUM SERPL-SCNC: 3.9 MEQ/L (ref 3.5–5.1)
RBC # BLD: 3.97 M/UL (ref 4.2–5.4)
SODIUM BLD-SCNC: 146 MEQ/L (ref 132–144)
TSH SERPL DL<=0.05 MIU/L-ACNC: 0.02 UIU/ML (ref 0.27–4.2)
WBC # BLD: 10.6 K/UL (ref 4.8–10.8)

## 2017-10-27 PROCEDURE — 83930 ASSAY OF BLOOD OSMOLALITY: CPT

## 2017-10-27 PROCEDURE — 99222 1ST HOSP IP/OBS MODERATE 55: CPT | Performed by: PHYSICIAN ASSISTANT

## 2017-10-27 PROCEDURE — 93005 ELECTROCARDIOGRAM TRACING: CPT

## 2017-10-27 PROCEDURE — 6360000002 HC RX W HCPCS: Performed by: INTERNAL MEDICINE

## 2017-10-27 PROCEDURE — 87040 BLOOD CULTURE FOR BACTERIA: CPT

## 2017-10-27 PROCEDURE — 87186 SC STD MICRODIL/AGAR DIL: CPT

## 2017-10-27 PROCEDURE — 2580000003 HC RX 258: Performed by: PHYSICIAN ASSISTANT

## 2017-10-27 PROCEDURE — 87077 CULTURE AEROBIC IDENTIFY: CPT

## 2017-10-27 PROCEDURE — 83605 ASSAY OF LACTIC ACID: CPT

## 2017-10-27 PROCEDURE — 36415 COLL VENOUS BLD VENIPUNCTURE: CPT

## 2017-10-27 PROCEDURE — 1210000000 HC MED SURG R&B

## 2017-10-27 PROCEDURE — 87086 URINE CULTURE/COLONY COUNT: CPT

## 2017-10-27 PROCEDURE — 6370000000 HC RX 637 (ALT 250 FOR IP): Performed by: PHYSICIAN ASSISTANT

## 2017-10-27 PROCEDURE — 92610 EVALUATE SWALLOWING FUNCTION: CPT

## 2017-10-27 PROCEDURE — 6370000000 HC RX 637 (ALT 250 FOR IP): Performed by: INTERNAL MEDICINE

## 2017-10-27 PROCEDURE — 2580000003 HC RX 258: Performed by: INTERNAL MEDICINE

## 2017-10-27 PROCEDURE — 6360000002 HC RX W HCPCS: Performed by: PHYSICIAN ASSISTANT

## 2017-10-27 PROCEDURE — G8996 SWALLOW CURRENT STATUS: HCPCS

## 2017-10-27 PROCEDURE — 2700000000 HC OXYGEN THERAPY PER DAY

## 2017-10-27 PROCEDURE — 84443 ASSAY THYROID STIM HORMONE: CPT

## 2017-10-27 PROCEDURE — 83036 HEMOGLOBIN GLYCOSYLATED A1C: CPT

## 2017-10-27 PROCEDURE — 80048 BASIC METABOLIC PNL TOTAL CA: CPT

## 2017-10-27 PROCEDURE — 93010 ELECTROCARDIOGRAM REPORT: CPT | Performed by: INTERNAL MEDICINE

## 2017-10-27 PROCEDURE — G8997 SWALLOW GOAL STATUS: HCPCS

## 2017-10-27 PROCEDURE — 85025 COMPLETE CBC W/AUTO DIFF WBC: CPT

## 2017-10-27 RX ADMIN — SODIUM CHLORIDE: 9 INJECTION, SOLUTION INTRAVENOUS at 03:35

## 2017-10-27 RX ADMIN — CIPROFLOXACIN 400 MG: 2 INJECTION, SOLUTION INTRAVENOUS at 20:44

## 2017-10-27 RX ADMIN — AZITHROMYCIN MONOHYDRATE 500 MG: 500 INJECTION, POWDER, LYOPHILIZED, FOR SOLUTION INTRAVENOUS at 12:26

## 2017-10-27 RX ADMIN — ACETAMINOPHEN 650 MG: 325 TABLET ORAL at 15:26

## 2017-10-27 RX ADMIN — CEFTRIAXONE SODIUM 1 G: 10 INJECTION, POWDER, FOR SOLUTION INTRAVENOUS at 12:26

## 2017-10-27 RX ADMIN — ASPIRIN 81 MG: 81 TABLET, COATED ORAL at 08:50

## 2017-10-27 RX ADMIN — POTASSIUM CHLORIDE: 2 INJECTION, SOLUTION, CONCENTRATE INTRAVENOUS at 12:26

## 2017-10-27 RX ADMIN — LEVOTHYROXINE SODIUM 100 MCG: 100 TABLET ORAL at 08:50

## 2017-10-27 RX ADMIN — PAROXETINE HYDROCHLORIDE 40 MG: 20 TABLET, FILM COATED ORAL at 08:50

## 2017-10-27 RX ADMIN — ACETAMINOPHEN 650 MG: 325 TABLET ORAL at 08:49

## 2017-10-27 RX ADMIN — ENOXAPARIN SODIUM 30 MG: 100 INJECTION SUBCUTANEOUS at 08:49

## 2017-10-27 RX ADMIN — Medication 10 ML: at 20:48

## 2017-10-27 ASSESSMENT — ENCOUNTER SYMPTOMS
NAUSEA: 0
SHORTNESS OF BREATH: 0
COUGH: 1
SHORTNESS OF BREATH: 1
COUGH: 0
NAUSEA: 1
ABDOMINAL PAIN: 0
VOMITING: 0

## 2017-10-27 ASSESSMENT — PAIN SCALES - GENERAL
PAINLEVEL_OUTOF10: 0
PAINLEVEL_OUTOF10: 0

## 2017-10-27 NOTE — CONSULTS
hearing and understanding her due to her lack of expiratory effort to speak. Because of this I asked her if she had an episode of choking while eating or drinking in the last month, she said that last week she was eating food and drinking water, had a severe coughing and choking episode, felt as though she may have inhaled something , therefore should consider possible aspiration pneumonia. Allergies: Allergies   Allergen Reactions    Latex     Penicillins     Vancomycin        PMH: Patient has a past medical history of Depression; Fatigue; Fibromyalgia; Hyperlipidemia; Hypertension; Hypothyroid; Levodopa-induced dyskinesia; Lumbago; Muscular wasting and disuse atrophy; Myalgia; Osteoarthritis; Paralysis agitans ([address] Parkinson's disease ([address] Spinal stenosis; Thyroid disease; and Urinary frequency. PSH: Patient has no past surgical history on file. Social History: Patient reports that she has never smoked. She has never used smokeless tobacco. She reports that she does not drink alcohol or use drugs. Family History: Patientsfamily history is not on file. ROS:  Review of Systems   Unable to perform ROS: Medical condition (ROS obtained from chart review, and son who is at bedside)   Constitutional: Positive for fever and malaise/fatigue. Respiratory: Positive for cough and shortness of breath. Cardiovascular: Negative for chest pain. Gastrointestinal: Positive for nausea. Negative for abdominal pain. Genitourinary:        Incontinence   Neurological: Positive for speech change. Negative for dizziness. Psychiatric/Behavioral: Positive for memory loss.        Current Meds:   potassium chloride 20 mEq in sodium chloride 0.45 % 1,000 mL infusion Continuous   azithromycin (ZITHROMAX) 500 mg in D5W 250ml addavial Q24H   cefTRIAXone (ROCEPHIN) 1 g in sterile water 10 mL IV syringe Q24H   sodium chloride flush 0.9 % injection 10 mL 2 times per day   sodium chloride flush 0.9 % injection 10 HCT  42.9  36.5*   PLT  209  175   PROTIME  10.9   --    INR  1.0   --      Chemistry:  Recent Labs      10/27/17   0609   NA  146*   K  3.9   CL  107   CO2  24   GLUCOSE  128*   BUN  86*   CREATININE  2.73*   ANIONGAP  15*   LABGLOM  16.9*   GFRAA  20.5*   CALCIUM  9.7       Urinalysis: Recent Labs      10/26/17   1215   COLORU  Yellow   PHUR  5.5   WBCUA  6-10*   RBCUA  3-5*   BACTERIA  Moderate   CLARITYU  CLOUDY*   SPECGRAV  1.018   LEUKOCYTESUR  TRACE*   UROBILINOGEN  0.2   BILIRUBINUR  Negative   BLOODU  Negative        Urine Culture   Lab Results   Component Value Date    LABURIN No growth 48 hours 10/19/2017       Radiology: Ct Abdomen Pelvis Wo Iv Contrast Additional Contrast? None    Result Date: 10/26/2017  CT of the Abdomen and pelvis, without intravenous contrast medium History:  79-year-old female with fever, altered mental status, and possible urinary retention. Technical Factors: CT imaging of the abdomen and pelvis were obtained and formatted as 5 mm contiguous axial images from the domes of the diaphragm to the symphysis pubis. Sagittal and coronal reconstructions were also obtained. Oral contrast medium:  None. Intravenous contrast medium:  None. Comparison:  CT abdomen and pelvis, October 19, 2017. Findings: Study limited secondary to lack of contrast medium. Lungs:  Lung consolidation posterior aspect right lung base. Subsegmental consolidation, and scarring left lung base again identified. Small to moderate hiatal hernia again identified. Liver:  Normal in size, shape, and attenuation. Gallbladder:  Not identified. Pancreas:  Normal without masses, cysts, ductal dilatation or calcification. Spleen:  Normal in size without masses or calcifications. No splenules. Kidneys:  Normal in size. .  No hydronephrosis, masses, or stones. Adrenals:  Normal. Small bowel:  Normal in caliber. Appendix:  Not visualized. Colon:  Diffuse diverticular change, greatest in sigmoid colon.  Peritoneum:  No ascites, free air, or fluid collections. Vessels: Aorta normal in course and caliber. Lymph nodes: No retroperitoneal lymph node enlargement. Bladder: No wall thickening. Trevizo catheter within urinary bladder. Air identified within nondependent portion urinary bladder. Bones:  No bone lesions. Right lower lobe pneumonia with left lower lobe subsegmental atelectasis/pneumonia. Small to moderate hiatal hernia. Colonic diverticulosis with no CT evidence diverticulitis. Air within urinary bladder most likely secondary to instrumentation/Trevizo catheter placement. All CT scans at this facility use dose modulation, iterative reconstruction, and/or weight based dosing when appropriate to reduce radiation dose to as low as reasonably achievable. Ct Abdomen Pelvis Wo Iv Contrast Additional Contrast? None    Result Date: 10/19/2017  CT of the Abdomen and Pelvis without intravenous contrast medium History:  Generalized abdominal pain. Technical Factors: CT imaging of the abdomen and pelvis were obtained and formatted as 5 mm contiguous axial images from the domes of the diaphragm to the symphysis pubis. Sagittal and coronal reconstructions were also obtained. Oral contrast medium:  None. Intravenous contrast medium:  Non-. Comparison:  January 23, 2014 Findings: Lungs:  Subsegmental, bilateral, dependent, atelectatic change. Liver:  Normal in size and shape. Bile Ducts:  Mild central intrahepatic ductal dilatation. Gallbladder:  Surgically absent. Pancreas:  Normal without masses, cysts, ductal dilatation or calcification. Spleen:  Normal in size without masses or calcifications. No splenules. Kidneys:  Normal in size. .  No hydronephrosis, masses, or stones. Adrenals:  Normal. Small bowel:  Normal in caliber. Probable retained radiopaque contents within nondilated small bowel loop in right lower quadrant. Appendix:  Not identified Colon:  Marked diffuse diverticular change throughout colon, unchanged.  Peritoneum:  No ascites, free air, or fluid collections. Vessels: Aorta calcified but normal in course and caliber. Lymph nodes:  Retroperitoneal:  No enlarged retroperitoneal lymph nodes. Bladder: Markedly distended. Small amount of air in nondependent portion of bladder. Soft tissues: There is stimulating device previously implanted in right posterior pelvic soft tissues has been removed in the interval. Bones: Narrowing L5-S1 disc space. Partial collapse, T10 again identified. Marked diffuse diverticulosis. No CT evidence diverticulitis. Mild interval increase in central intrahepatic ductal dilatation in this elderly postcholecystectomy patient. Marked distention urinary bladder. Air within nondependent portion urinary bladder may be secondary to instrumentation, or catheterization. Other findings discussed. All CT scans at this facility use dose modulation, iterative reconstruction, and/or weight based dosing when appropriate to reduce radiation dose to as low as reasonably achievable. .    Us Retroperitoneal Limited    Result Date: 10/26/2017  EXAMINATION: ULTRASOUND RETROPERITONEAL LIMITED (KIDNEYS) CLINICAL HISTORY:  RENAL FAILURE, ACUTE (KIDNEY INJURY)  N17.9 Acute renal failure superimposed on chronic kidney disease, on chronic dialysis, unspecified acute renal failure type ([address] ICD10 COMPARISONS:  NONE AVAILABLE TECHNIQUE: Transabdominal sector gray scale sonography. Sonographic imaging was performed by a registered sonographer and the images are submitted for interpretation. FINDINGS:  The right kidney measures 4.8 x 4.9 x 10.0cm. The left kidney measures 4.7 x 5.7 x 10.4cm. There is no hydronephrosis. No renal masses are identified. Renal parenchymal echotexture is within normal limits. NEGATIVE ULTRASOUND EXAMINATION OF THE KIDNEYS. Assessment:   1. Urinary retention  2. Acute renal failure  3. Generalized weakness and fatigue  4. Sepsis, Fever greater than 101.0°F, tachycardia   5.  Right lower

## 2017-10-27 NOTE — PROGRESS NOTES
Pt has been lethargic so far on shift, pt non-verbal so far, B[place] speaking, will not use  phone so far on shift.  Family visited pt for several hours, will return in morning

## 2017-10-27 NOTE — PROGRESS NOTES
SPEECH/LANGUAGE PATHOLOGY  BEDSIDE SWALLOWING EVALUATION    PATIENT NAME:  Robyn Willis      :  1940      TODAY'S DATE:  10/27/2017  [id number]    Time in: 901    Time out: 466      [x]The admitting diagnosis and active problem list, as listed below have been reviewed prior to initiation of this evaluation. Acute renal failure superimposed on chronic kidney disease, on chronic dialysis, unspecified acute renal failure type ([facility] [N17.9, N18.9, Z99.2]  There is no problem list on file for this patient.     Allergies   Allergen Reactions    Latex     Penicillins     Vancomycin        DYSPHAGIA DIAGNOSIS:  Moderate oral dysphagia, suspected mild-moderate pharyngeal dysphagia      DIET RECOMMENDATIONS: Puree (Dyphagia I)/Thin    FEEDING RECOMMENDATIONS:    []No assistance required   []Stand by assist    [x]Full assistance required  []Set up required    []Supervision with all PO intake [x]Up at 90      []Double swallow    []Chin tuck   []Multiple swallow     [x]Small bites/sips      [x]Alternate solids / liquids  []Check for oral pocketing   []No straw    []Throat clear       []Spoon sip liquids   []Effortful swallow       THERAPY RECOMMENDATIONS:   []  Therapy is not recommended     [x]  Therapy is recommended to:    [x]  Improve oral motor strength and range of motion    [x]  Improve tongue base retraction     [x]  Improve laryngeal strength      [x]  Mealtime assessment of patient's tolerance of prescribed diet     []  Repeat bedside swallow study in    []  Modified Barium Swallow (MBS) is recommended and requires a Physician order    [x]  Preston Cho RN notified                 DIET LEVEL PRIOR TO THIS EVALUATION :    Diet NPO Effective Now    CURRENT RESPIRATORY STATUS:   [x]Room Air  []Nasal Canula []O2  Mask  []Non Re-Breather    []Ventilator  []BiPAP  []Tracheostomy with Room Air []Tracheostomy with O2        CURRENT COGNITIVE STATUS:   [x] Alert    []Responsive       []Non-responsive []Confused  [x] Cooperative     []Uncooperative    []Aphasic       []Impulsive              PROCEDURE   Consistencies Administered During the Evaluation   Liquids: [x]  Thin    []  Nectar   []  Honey  Solids:  [x]  Pureed/Pudding   []  Soft Solid   []  Solid     []Moistened Toothette   []Other:      Method of Intake:   []  Cup    [x]  Spoon  [x]  Straw    []  Self Fed    []Set-Up     [x]  Fed by Clinician     Position:   []  Upright seated ([]In bed [] in chair)   [x]  Reclined upright in bed                     ORAL MECHANISM EXAMINATION  [] Adequate lingual/labial strength  [x] Generalized oral weakness  [] Left labiobuccal weakness   [] Right labiobuccal weakness  [] Left lingual deviation   [] Right lingual deviation  [] Oral apraxia    [] CNT  [] DNT    RESULTS   Oral Stage:   []  WNL []  WFL [x]  Exceptions     [x]  Dentition: []  natural  []  missing teeth  [x]  edentulous  []  partials  [] dentures     [x]  Inadequate labial seal resulting anterior labial spillage from:   [] right  [] left  [x] midline     [x]  Decreased mastication due to:     []  poor/missing dentition  [x]  decreased lingual control  []  vertical munch    []  cognitive function    [x]  Delayed A-P transit due to []  decreased initiation   [x] reduced lingual strength         []  cognitive function     [x]  Oral residuals []  right buccal cavity  []  left buccal cavity []  sub lingually   []  on tongue base   []  throughout oral cavity     [x]  on superior tongue       []  on palate               []  on velum              []  anterior sulcus    Oral Stage Impression: Moderate oral dysphagia characterized by delayed A-P transit d/t reduced lingual strength, inadequate labial seal at midline, increased mastication time and lingual pumping for the puree consistency tested, as well as oral residuals on superior tongue. Pt is edentulous and demonstrated significant oral weakness.      Pharyngeal Stage:  []  WNL []  WFL      [x]  Exceptions [x] Multiple swallows were noted after presentation of:    [x]  thin  []  nectar  []  Honey  []  pureed  [] solid    []  Consistent O2  desaturation after the swallow  []  Eye watering/flushing of skin  []  Congested cough throughout evaluation  [x] Delayed initiation of the pharyngeal swallow noted  []  Absent swallow    Pharyngeal Stage Impression: Suspected mild-moderate pharyngeal dysphagia characterized by a mildly delayed initiation of the pharyngeal swallow notes as well as a double swallow for thin liquids via straw. Pt tolerated small bites of puree via spoon and thin liquid via spoon and via straw with no overt s/s of aspiration. Pt demonstrated good laryngeal elevation but decreased strength. Long Term Goals:  [x] Will tolerate least restrictive diet safely      []Goals to be determined after MBS  `    [] No Treatment indicated at this time    Short Term Goals:   Pt to be seen  1-3x/week    [x] will improve oral/motor/swallow strength/function   [] will use safe swallow strategies with:   [x] will improve pharyngeal swallow strength/function   [x] will tolerate current diet level with no overt s/s of aspiration    Plan of Care:    [x] Oral/motor exercises   [] Adduction/voice/pitch exercises   [x] Dysphagia exercies    [] Mastication exercises   [x] Therapeutic meal/monitoring/feeding  [] MBS Recommended      [x]  Prognosis for improvements is Good, prior level of function    Pain: 3, chest    []Nursing notified    Safety Devices:  [x] Call light within reach [] Chair alarm activated  [x] Bed alarm activated    Patient Education:  Treatment goals discussed with [x]  Patient  []  family. The [x]  Patient  []  family understand the diagnosis, prognosis and plan of care.                  [x] Reinforcement needed      Gcodes       Swallow Current  CK   Swallow Goal  CI     Severity Levels  CH - 0% Impaired or limited  CI - At least 1%, but less than 20%  CJ - At least 20%, but less than 40%  CK - At least 40%, but less than 60%  CL - At least 60%, but less than 80%  CM - At least 80%, but less than 100%  CN - 100% impaired or limited    Electronically signed by GUS Templeton on 10/27/2017 at 9:32 AM

## 2017-10-27 NOTE — PROGRESS NOTES
Pt HR sustaining 119-120, Dr Nguyen Zelaya notified, order for fluid increase to 125ml/hr, continue to monitor HR

## 2017-10-27 NOTE — CONSULTS
[facility]  Consult [facility]           Reason for Consult:  Acute renal failure  Requesting Physician:  Dr. Parish Oliver / Oscar Multani    Chief Complaint:  alterned mental status  History Obtained From:  patient, electronic medical record    History of Present Ilness:    78y.o. year old female admitted with altered mental status. Had severe KATHYA. Had fevers. Waldrop placed in ER and over a liter came out right away. Cr improved markedly with ivf and waldrop. She told speech person she had some throat pain. She denies pain to me. No sob. HR was running high. ivf were increased. Has good uo. Renal u/s negative. Urine with 6-10 wbc    Past Medical History:        Diagnosis Date    Depression     Fatigue     Fibromyalgia     Hyperlipidemia     Hypertension     Hypothyroid     Levodopa-induced dyskinesia     Lumbago     Muscular wasting and disuse atrophy     Myalgia     Osteoarthritis     Paralysis agitans ([address]     Parkinson's disease ([address]     Spinal stenosis     Thyroid disease     Urinary frequency        Past Surgical History:    History reviewed. No pertinent surgical history. Home Medications:    No current facility-administered medications on file prior to encounter.       Current Outpatient Prescriptions on File Prior to Encounter   Medication Sig Dispense Refill    ALPRAZolam (XANAX) 0.5 MG tablet Take 0.5 mg by mouth 3 times daily as needed for Sleep      CALCIUM CARBONATE PO Take by mouth      gabapentin (NEURONTIN) 300 MG capsule Take 300 mg by mouth 3 times daily      tiZANidine (ZANAFLEX) 4 MG tablet Take 4 mg by mouth daily      levothyroxine (SYNTHROID) 100 MCG tablet Take 100 mcg by mouth daily      docusate sodium (COLACE) 100 MG capsule Take 100 mg by mouth daily      diltiazem (CARDIZEM) 30 MG tablet Take 30 mg by mouth 3 times daily      pramipexole (MIRAPEX) 0.5 MG tablet Take 0.5 mg by mouth 3 times daily       solifenacin (VESICARE) 10 MG tablet Take 5 mg by mouth daily      dexlansoprazole (DEXILANT) 60 MG CPDR delayed release capsule Take 60 mg by mouth daily      linaclotide (LINZESS) 290 MCG CAPS capsule Take 290 mcg by mouth every morning (before breakfast)      aspirin 81 MG EC tablet Take 81 mg by mouth daily      Cholecalciferol (VITAMIN D3) 2000 units CAPS Take 1 capsule by mouth daily      carbidopa-levodopa (SINEMET)  MG per tablet Take 1 tablet by mouth 3 times daily      busPIRone (BUSPAR) 10 MG tablet Take 10 mg by mouth daily      albuterol sulfate  (90 Base) MCG/ACT inhaler Inhale 2 puffs into the lungs every 4 hours as needed for Wheezing      albuterol (PROVENTIL) (2.5 MG/3ML) 0.083% nebulizer solution Take 2.5 mg by nebulization every 6 hours as needed for Wheezing      bisacodyl (DULCOLAX) 10 MG suppository Place 10 mg rectally daily as needed for Constipation      omeprazole (PRILOSEC) 20 MG delayed release capsule Take 40 mg by mouth daily      sucralfate (CARAFATE) 1 GM tablet Take 1 g by mouth 4 times daily      meperidine (DEMEROL) 100 MG tablet Take 100 mg by mouth every 4 hours as needed for Pain .  dronabinol (MARINOL) 2.5 MG capsule Take 2.5 mg by mouth 2 times daily (before meals)      cholestyramine (QUESTRAN) 4 g packet Take 1 packet by mouth 3 times daily      lactulose encephalopathy 10 GM/15ML SOLN solution Take 20 g by mouth 3 times daily      naloxegol (MOVANTIK) 25 MG TABS tablet Take 25 mg by mouth every morning      famotidine (PEPCID) 20 MG tablet Take 20 mg by mouth 2 times daily         Allergies:  Latex; Penicillins; and Vancomycin    Social History:    Social History     Social History    Marital status:      Spouse name: N/A    Number of children: N/A    Years of education: N/A     Occupational History    Not on file.      Social History Main Topics    Smoking status: Never Smoker    Smokeless tobacco: Never Used    Alcohol use No    Drug use: No    Sexual activity: Not on file     Other Topics Concern    Not on file     Social History Narrative    No narrative on file       Family History:   History reviewed. No pertinent family history. Review of Systems:   10 point review of systems negative other than what is in HPI      Physical exam:   Constitutional:  VITALS:  BP (!) 140/83   Pulse 119   Temp 98.6 °F (37 °C) (Oral)   Resp 16   Wt 150 lb (68 kg)   SpO2 97%   BMI 27.44 kg/m²   Gen: alert, awake, nad  Skin: no rash, turgor wnl  Heent:  eomi, mmm  Neck: no bruits or jvd noted, thyroid normal  Lungs:  Normal expansion. Clear to auscultation. No rales, rhonchi, or wheezing. Heart:  Heart sounds are normal.  Regular rate and rhythm without murmur, gallop or rub. Abdomen:  +bs, soft, nt, nd, no hepatosplenomegaly   Extremities: Extremities warm to touch, pink, with no edema. Psychiatric: mood and affect appropriate; judgement and insight intact  Musculoskeletal:  Rom, muscular strength intact; digits, nails normal    Data/  CBC:   Lab Results   Component Value Date    WBC 10.6 10/27/2017    RBC 3.97 10/27/2017    HGB 11.9 10/27/2017    HCT 36.5 10/27/2017    MCV 92.0 10/27/2017    MCH 29.9 10/27/2017    MCHC 32.5 10/27/2017    RDW 13.5 10/27/2017     10/27/2017    MPV 9.4 09/22/2015     BMP:    Lab Results   Component Value Date     10/27/2017    K 3.9 10/27/2017     10/27/2017    CO2 24 10/27/2017    BUN 86 10/27/2017    LABALBU 3.8 10/26/2017    CREATININE 2.73 10/27/2017    CALCIUM 9.7 10/27/2017    GFRAA 20.5 10/27/2017    LABGLOM 16.9 10/27/2017    GLUCOSE 128 10/27/2017     Ct Abdomen Pelvis Wo Iv Contrast Additional Contrast? None    Result Date: 10/26/2017  CT of the Abdomen and pelvis, without intravenous contrast medium History:  51-year-old female with fever, altered mental status, and possible urinary retention.  Technical Factors: CT imaging of the abdomen and pelvis were obtained and formatted as 5 mm contiguous axial images from the V[facility])    Result Date: 10/19/2017  Chest 2 views HISTORY: Cough. FINDINGS: Comparison September 21, 2015. Imaged taken in partial expiration. Cardiac size indeterminate secondary to technique. No focal airspace disease identified. Osseous structures intact. No acute cardiopulmonary disease, with above limitations. Ct Head Without Contrast    Result Date: 10/26/2017  HEAD CT WITHOUT CONTRAST: 10/26/2017 CLINICAL HISTORY: Altered mental status. COMPARISON: 9/21/2015. TECHNIQUE: Spiral unenhanced images were obtained of the head, with routine reconstructions performed. All CT scans at this facility use dose modulation, iterative reconstruction, and/or weight based dosing when appropriate to reduce radiation dose to as low as reasonably achievable. FINDINGS: There is no intracranial hemorrhage, mass effect, midline shift, extra-axial collection,  evidence of hydrocephalus, recent ischemic infarct, or skull fracture identified. Mild atrophic changes appear similarly prior study The mastoid air cells and visualized paranasal sinuses are clear. NO ACUTE INTRACRANIAL PROCESS, OR SIGNIFICANT CHANGE FROM 9/21/2015 IDENTIFIED. Xr Chest Portable    Result Date: 10/26/2017  EXAMINATION: XR CHEST PORTABLE 1 VIEW: CLINICAL HISTORY: PATIENT UNRESPONSIVE. COMPARISONS: 10/19/2017 FINDINGS: Radiograph was obtained in shallow inspiration. There is moderate elevation of the right hemidiaphragm. There is mild right lower lung linear atelectasis. Cardiac size is borderline. Pulmonary vascularity is normal. There are no definite infiltrates or effusions. There is no pneumothorax. There are atherosclerotic changes of the thoracic aorta. There is mild thoracolumbar levocurvature, probably positional. There is no significant change from prior exam.     SHALLOW INSPIRATORY PORTABLE CHEST RADIOGRAPH. MODERATE RIGHT HEMIDIAPHRAGMATIC ELEVATION AND RIGHT BASILAR ATELECTASIS.     Us Retroperitoneal Limited    Result Date:

## 2017-10-27 NOTE — PROGRESS NOTES
[facility] Occupational Therapy      Date: 10/27/2017  Patient Name: Tiffanie Willingham        MRN: [id number]  Account: [de-identified]   : 1940  (78 y.o.)  Room: [place] [address]    Chart reviewed, attempted OT eval at 11:10 AM, but pt. unavailable 2° to:    [] Hold per nsg request    [] Pt declined     [x] Pt having bedside test/procedure. Will attempt again when able.     Electronically signed by NUBIA Collier on 10/27/2017 at 11:10 AM

## 2017-10-27 NOTE — PROGRESS NOTES
Yi Stinson is a 78 y.o. female patient.  Pt was seen and evaluated, used a  to speak with  , as per  the mental is better    Current Facility-Administered Medications   Medication Dose Route Frequency Provider Last Rate Last Dose    potassium chloride 20 mEq in sodium chloride 0.45 % 1,000 mL infusion   Intravenous Continuous Isak Avalos  mL/hr at 10/27/17 1226      azithromycin (ZITHROMAX) 500 mg in D5W 250ml addavial  500 mg Intravenous Q24H Lit Alfonso MD   Stopped at 10/27/17 1416    cefTRIAXone (ROCEPHIN) 1 g in sterile water 10 mL IV syringe  1 g Intravenous Q24H Lit Alfonso MD   1 g at 10/27/17 1226    sodium chloride flush 0.9 % injection 10 mL  10 mL Intravenous 2 times per day NATHANIEL Morales        sodium chloride flush 0.9 % injection 10 mL  10 mL Intravenous PRN NATHANIEL Morales        acetaminophen (TYLENOL) tablet 650 mg  650 mg Oral Q4H PRN NATHANIEL Almeida   650 mg at 10/27/17 0849    magnesium hydroxide (MILK OF MAGNESIA) 400 MG/5ML suspension 30 mL  30 mL Oral Daily PRN NATHANIEL Almeida        ondansetron [facility]) injection 4 mg  4 mg Intravenous Q6H PRN NATHANIEL Almeida        enoxaparin (LOVENOX) injection 30 mg  30 mg Subcutaneous Daily Magen Almeidama   30 mg at 10/27/17 0849    ciprofloxacin (CIPRO) IVPB 400 mg  400 mg Intravenous Q24H Jessa Morales   Stopped at 10/26/17 2135    aspirin EC tablet 81 mg  81 mg Oral Daily Lit Alfonso MD   81 mg at 10/27/17 0850    PARoxetine (PAXIL) tablet 40 mg  40 mg Oral QAM Lit Alfonso MD   40 mg at 10/27/17 0850    levothyroxine (SYNTHROID) tablet 100 mcg  100 mcg Oral Daily Lit Alfonso MD   100 mcg at 10/27/17 0850     Allergies   Allergen Reactions    Latex     Penicillins     Vancomycin      Active Problems:    Hyperglycemia    Urinary retention    Blood pressure (!) 140/83, pulse 119, temperature 98.6 °F (37 °C), temperature source Oral, resp. rate 16, weight 150 lb (68 kg), SpO2 97 %. Subjective:  Symptoms:  She reports malaise and weakness. No shortness of breath, cough, chest pain or chest pressure. Diet:  No nausea or vomiting. Objective:  General Appearance:  Ill-appearing. Vital signs: (most recent): Blood pressure (!) 140/83, pulse 119, temperature 98.6 °F (37 °C), temperature source Oral, resp. rate 16, weight 150 lb (68 kg), SpO2 97 %. HEENT: Normal HEENT exam.    Lungs:  Normal effort. (Decrease BS)  Heart: Normal rate. Regular rhythm. S1 normal.    Abdomen: Abdomen is soft. Bowel sounds are normal.     Neurological: Patient is alert. Pupils:  Pupils are equal, round, and reactive to light. Skin:  Warm and dry.       Lab Results   Component Value Date    WBC 10.6 10/27/2017    HGB 11.9 (L) 10/27/2017    HCT 36.5 (L) 10/27/2017    MCV 92.0 10/27/2017     10/27/2017     Lab Results   Component Value Date     10/27/2017    K 3.9 10/27/2017     10/27/2017    CO2 24 10/27/2017    BUN 86 10/27/2017    CREATININE 2.73 10/27/2017    GLUCOSE 128 10/27/2017    CALCIUM 9.7 10/27/2017        Assessment & Plan   Metabolic encephaloapthy vs infectious  2) PNA  C/w IV abx  3) metabolic encephalopathy vs infectious   Better  3) KATHYA resolved  4) sepsis POA  5) UTI fu cultures  6) acute urinary retension appreciate renal and urology input    Melita Coleman MD  10/27/2017

## 2017-10-27 NOTE — PROGRESS NOTES
Physical Therapy Missed Treatment   Facility/Department: [facility] MED SURG [id number]    NAME: Robyn Willis    : 1940 (78 y.o.)  MRN: [id number]    Account: [de-identified]  Gender: female    Patient shakes head \"no\" when signaled to sit on edge of bed. Unable to use blue phones due to patient low talker. Will follow and attempt PT evaluation again at earliest convenience.         Gini Covington, PT, 10/27/17 at 2:01 PM

## 2017-10-28 LAB
ANION GAP SERPL CALCULATED.3IONS-SCNC: 11 MEQ/L (ref 7–13)
BASOPHILS ABSOLUTE: 0 K/UL (ref 0–0.2)
BASOPHILS RELATIVE PERCENT: 0.4 %
BUN BLDV-MCNC: 46 MG/DL (ref 8–23)
CALCIUM SERPL-MCNC: 9 MG/DL (ref 8.6–10.2)
CHLORIDE BLD-SCNC: 111 MEQ/L (ref 98–107)
CO2: 26 MEQ/L (ref 22–29)
CREAT SERPL-MCNC: 1.03 MG/DL (ref 0.5–0.9)
EOSINOPHILS ABSOLUTE: 0.2 K/UL (ref 0–0.7)
EOSINOPHILS RELATIVE PERCENT: 2.5 %
FERRITIN: 214.1 NG/ML (ref 13–150)
FOLATE: 12.7 NG/ML (ref 7.3–26.1)
GFR AFRICAN AMERICAN: >60
GFR NON-AFRICAN AMERICAN: 52.1
GLUCOSE BLD-MCNC: 145 MG/DL (ref 74–109)
HCT VFR BLD CALC: 32.5 % (ref 37–47)
HEMOGLOBIN: 10.8 G/DL (ref 12–16)
IRON SATURATION: 17 % (ref 11–46)
IRON: 37 UG/DL (ref 37–145)
LV EF: 65 %
LVEF MODALITY: NORMAL
LYMPHOCYTES ABSOLUTE: 0.6 K/UL (ref 1–4.8)
LYMPHOCYTES RELATIVE PERCENT: 7.9 %
MAGNESIUM: 2.1 MG/DL (ref 1.7–2.3)
MCH RBC QN AUTO: 30.2 PG (ref 27–31.3)
MCHC RBC AUTO-ENTMCNC: 33.4 % (ref 33–37)
MCV RBC AUTO: 90.5 FL (ref 82–100)
MONOCYTES ABSOLUTE: 0.8 K/UL (ref 0.2–0.8)
MONOCYTES RELATIVE PERCENT: 9.8 %
NEUTROPHILS ABSOLUTE: 6.3 K/UL (ref 1.4–6.5)
NEUTROPHILS RELATIVE PERCENT: 79.4 %
PDW BLD-RTO: 13.2 % (ref 11.5–14.5)
PHOSPHORUS: 1.6 MG/DL (ref 2.5–4.5)
PLATELET # BLD: 160 K/UL (ref 130–400)
POTASSIUM SERPL-SCNC: 3.8 MEQ/L (ref 3.5–5.1)
RBC # BLD: 3.59 M/UL (ref 4.2–5.4)
SODIUM BLD-SCNC: 148 MEQ/L (ref 132–144)
TOTAL IRON BINDING CAPACITY: 223 UG/DL (ref 178–450)
VITAMIN B-12: 504 PG/ML (ref 211–946)
WBC # BLD: 7.9 K/UL (ref 4.8–10.8)

## 2017-10-28 PROCEDURE — 93306 TTE W/DOPPLER COMPLETE: CPT

## 2017-10-28 PROCEDURE — 2580000003 HC RX 258: Performed by: INTERNAL MEDICINE

## 2017-10-28 PROCEDURE — 82746 ASSAY OF FOLIC ACID SERUM: CPT

## 2017-10-28 PROCEDURE — 2500000003 HC RX 250 WO HCPCS: Performed by: INTERNAL MEDICINE

## 2017-10-28 PROCEDURE — 6360000002 HC RX W HCPCS: Performed by: PHYSICIAN ASSISTANT

## 2017-10-28 PROCEDURE — 83540 ASSAY OF IRON: CPT

## 2017-10-28 PROCEDURE — 83735 ASSAY OF MAGNESIUM: CPT

## 2017-10-28 PROCEDURE — 6360000002 HC RX W HCPCS

## 2017-10-28 PROCEDURE — 83550 IRON BINDING TEST: CPT

## 2017-10-28 PROCEDURE — 2580000003 HC RX 258: Performed by: PHYSICIAN ASSISTANT

## 2017-10-28 PROCEDURE — 2500000003 HC RX 250 WO HCPCS

## 2017-10-28 PROCEDURE — 93005 ELECTROCARDIOGRAM TRACING: CPT

## 2017-10-28 PROCEDURE — 85025 COMPLETE CBC W/AUTO DIFF WBC: CPT

## 2017-10-28 PROCEDURE — 36415 COLL VENOUS BLD VENIPUNCTURE: CPT

## 2017-10-28 PROCEDURE — 2060000000 HC ICU INTERMEDIATE R&B

## 2017-10-28 PROCEDURE — 6360000002 HC RX W HCPCS: Performed by: INTERNAL MEDICINE

## 2017-10-28 PROCEDURE — 82607 VITAMIN B-12: CPT

## 2017-10-28 PROCEDURE — 6370000000 HC RX 637 (ALT 250 FOR IP): Performed by: INTERNAL MEDICINE

## 2017-10-28 PROCEDURE — 82728 ASSAY OF FERRITIN: CPT

## 2017-10-28 PROCEDURE — 99222 1ST HOSP IP/OBS MODERATE 55: CPT | Performed by: INTERNAL MEDICINE

## 2017-10-28 PROCEDURE — 84100 ASSAY OF PHOSPHORUS: CPT

## 2017-10-28 PROCEDURE — 80048 BASIC METABOLIC PNL TOTAL CA: CPT

## 2017-10-28 RX ORDER — POTASSIUM CHLORIDE AND SODIUM CHLORIDE 450; 150 MG/100ML; MG/100ML
INJECTION, SOLUTION INTRAVENOUS CONTINUOUS
Status: DISCONTINUED | OUTPATIENT
Start: 2017-10-28 | End: 2017-10-28

## 2017-10-28 RX ORDER — DEXTROSE MONOHYDRATE 50 MG/ML
INJECTION, SOLUTION INTRAVENOUS CONTINUOUS
Status: DISPENSED | OUTPATIENT
Start: 2017-10-28 | End: 2017-10-28

## 2017-10-28 RX ORDER — DEXTROSE MONOHYDRATE 50 MG/ML
INJECTION, SOLUTION INTRAVENOUS CONTINUOUS
Status: DISCONTINUED | OUTPATIENT
Start: 2017-10-28 | End: 2017-10-28

## 2017-10-28 RX ORDER — ADENOSINE 3 MG/ML
6 INJECTION, SOLUTION INTRAVENOUS
Status: ACTIVE | OUTPATIENT
Start: 2017-10-28 | End: 2017-10-28

## 2017-10-28 RX ORDER — METOPROLOL TARTRATE 5 MG/5ML
5 INJECTION INTRAVENOUS EVERY 6 HOURS
Status: DISCONTINUED | OUTPATIENT
Start: 2017-10-28 | End: 2017-10-28

## 2017-10-28 RX ADMIN — METOPROLOL TARTRATE 25 MG: 25 TABLET ORAL at 10:27

## 2017-10-28 RX ADMIN — ASPIRIN 81 MG: 81 TABLET, COATED ORAL at 10:27

## 2017-10-28 RX ADMIN — PAROXETINE HYDROCHLORIDE 40 MG: 20 TABLET, FILM COATED ORAL at 10:27

## 2017-10-28 RX ADMIN — AZITHROMYCIN MONOHYDRATE 500 MG: 500 INJECTION, POWDER, LYOPHILIZED, FOR SOLUTION INTRAVENOUS at 10:29

## 2017-10-28 RX ADMIN — CEFTRIAXONE SODIUM 1 G: 10 INJECTION, POWDER, FOR SOLUTION INTRAVENOUS at 10:38

## 2017-10-28 RX ADMIN — Medication 10 ML: at 10:27

## 2017-10-28 RX ADMIN — DEXTROSE MONOHYDRATE: 50 INJECTION, SOLUTION INTRAVENOUS at 10:30

## 2017-10-28 RX ADMIN — METOPROLOL TARTRATE 5 MG: 5 INJECTION INTRAVENOUS at 07:08

## 2017-10-28 RX ADMIN — METOPROLOL TARTRATE 25 MG: 25 TABLET ORAL at 21:31

## 2017-10-28 RX ADMIN — ENOXAPARIN SODIUM 30 MG: 100 INJECTION SUBCUTANEOUS at 10:30

## 2017-10-28 RX ADMIN — Medication 10 ML: at 21:31

## 2017-10-28 NOTE — PROGRESS NOTES
Physical Therapy   Facility/[facility] MED SURG [id number]    NAME: Kiley Felix    : 1940 (78 y.o.)  MRN: [id number]    Account: [de-identified]  Gender: female    PT evaluation and treatment orders received. Chart reviewed. PT eval attempted. Hold PT eval: rapid response called this morning. Per chart review:  Patient \"resides with her spouse, uses a walker and has On license of [facility] 10 am-2:30 pm Mon-Fri and for 1 1/2 hours on Saturday and \"    Will follow and attempt PT evaluation again at earliest convenience.         Electronically signed by Rosalinda Sanders PT on 10/28/17 at 9:21 AM

## 2017-10-28 NOTE — PROGRESS NOTES
Nutrition Assessment    Type and Reason for Visit: Initial, Positive Nutrition Screen    Nutrition Recommendations:     START STANDARD HIGH CALORIE ONS- BID,  And FROZEN ONS- BID. Malnutrition Assessment:  · Malnutrition Status: Insufficient data  · Context: Acute illness or injury  · Findings of the 6 clinical characteristics of malnutrition (Minimum of 2 out of 6 clinical characteristics is required to make the diagnosis of moderate or severe Protein Calorie Malnutrition based on AND/ASPEN Guidelines):  1. Energy Intake-Not available, not able to assess    2. Weight Loss-Unable to assess, unable to assess  3. Fat Loss-No significant subcutaneous fat loss,    4. Muscle Loss-Mild muscle mass loss, Clavicles (pectoralis and deltoids), Temples (temporalis muscle)  5. Fluid Accumulation-Mild fluid accumulation, Extremities  6.  Strength-Not measured    Nutrition Diagnosis:   · Problem: Inadequate oral intake, in context of acute illness or injury  · Etiology: related to Insufficient energy/nutrient consumption, Difficulty swallowing     Signs and symptoms:  as evidenced by Swallow study results, Intake 0-25% (AMS.)    Nutrition Assessment:  · Subjective Assessment: Patient admitted from home with AMS, tita. Currently no family member present. · Nutrition-Focused Physical Findings: Bilateral LE Edema +1. 10-27- I/- 3562 ml O/- 2575 ml. Peripheral IV.   · Wound Type: None  · Current Nutrition Therapies:  · Oral Diet Orders: Dysphagia 1 (Pureed)   · Oral Diet intake: 1-25%  · Oral Nutrition Supplement (ONS) Orders: None  · Anthropometric Measures:  · Ht:  (Not Available~ 5'2\")   · Current Body Wt: 139 lb (63 kg)  · Usual Body Wt: 142 lb (64.4 kg) (9/21/15)  · % Weight Change:  (Not Available),     · Ideal Body Wt:  (~ 110#), % Ideal Body  (~ 126%)  · BMI Classification: BMI 25.0 - 29.9 Overweight (~ 25.4)  · Comparative Standards (Estimated Nutrition Needs):  · Estimated Daily Total Kcal: 1575 to

## 2017-10-28 NOTE — CONSULTS
Chief Complaint   Patient presents with    Fever     pt sent in by visiting physicians for fever, AMS, and possible urine retention        Patient is a 78 y.o. female who presents with a chief complaint of MS changes/fatigue. Patient is followed on a regular basis by Dr. Kalani Mistry. Patient was diagnosed with urinary tract infection and apparently developed SVT and a heart rate of 180s on the second floor and transferred to the first floor after converting with adenosine. Patient and her  only Afghan speaking. Most of the information is obtained from the chart and the staff. There is no history of  MI congestive heart failure or any arrhythmias. No history of stress test or left heart catheterization. Initial EKG shows normal sinus rhythm with poor R-wave progression. She did have a creatinine of 6.87 on presentation currently down to 1.03 and her potassium was initially 5.6 now down to 3.8. Magnesium is normal at 2.1 she does have mild anemia as well. Patient Active Problem List   Diagnosis    Hyperglycemia    Urinary retention       History reviewed. No pertinent surgical history. Social History     Social History    Marital status:      Spouse name: N/A    Number of children: N/A    Years of education: N/A     Social History Main Topics    Smoking status: Never Smoker    Smokeless tobacco: Never Used    Alcohol use No    Drug use: No    Sexual activity: Not Asked     Other Topics Concern    None     Social History Narrative    None       History reviewed. No pertinent family history.     Current Facility-Administered Medications   Medication Dose Route Frequency Provider Last Rate Last Dose    adenosine (ADENOCARD) injection 6 mg  6 mg Intravenous Once PRN Bina Clark MD        metoprolol tartrate (LOPRESSOR) tablet 25 mg  25 mg Oral BID Justice Pratt MD   25 mg at 10/28/17 1027    dextrose 5 % solution   Intravenous Continuous Justice Pratt  mL/hr at 10/28/17 1030      azithromycin (ZITHROMAX) 500 mg in D5W 250ml addavial  500 mg Intravenous Q24H Cristhian Mckeon MD   Stopped at 10/28/17 1130    cefTRIAXone (ROCEPHIN) 1 g in sterile water 10 mL IV syringe  1 g Intravenous Q24H Cristhian Mckeon MD   1 g at 10/28/17 1038    sodium chloride flush 0.9 % injection 10 mL  10 mL Intravenous 2 times per day NATHANIEL Blair   10 mL at 10/28/17 1027    sodium chloride flush 0.9 % injection 10 mL  10 mL Intravenous PRN NATHANIEL Blair        acetaminophen (TYLENOL) tablet 650 mg  650 mg Oral Q4H PRN NATHANIEL Blair   650 mg at 10/27/17 1526    magnesium hydroxide (MILK OF MAGNESIA) 400 MG/5ML suspension 30 mL  30 mL Oral Daily PRN NATHANIEL Blair        ondansetron [facility]) injection 4 mg  4 mg Intravenous Q6H PRN NATHANIEL Blair        enoxaparin (LOVENOX) injection 30 mg  30 mg Subcutaneous Daily NATHANIEL Blair   30 mg at 10/28/17 1030    aspirin EC tablet 81 mg  81 mg Oral Daily Cristhian Mckeon MD   81 mg at 10/28/17 1027    PARoxetine (PAXIL) tablet 40 mg  40 mg Oral QAM Cristhian Mckeon MD   40 mg at 10/28/17 1027       Latex; Penicillins; and Vancomycin    Review of Systems   Unable to perform ROS: Dementia         VITALS:  Blood pressure 132/60, pulse 70, temperature 97.5 °F (36.4 °C), temperature source Oral, resp. rate 18, weight 139 lb 11.2 oz (63.4 kg), SpO2 99 %. Body mass index is 25.55 kg/m². Physical Exam   HENT:   Head: Normocephalic and atraumatic. Eyes: Pupils are equal, round, and reactive to light. Neck: Normal range of motion. Neck supple. No JVD present. No tracheal deviation present. No thyromegaly present. Cardiovascular: Normal rate, regular rhythm, normal heart sounds and intact distal pulses. PMI is not displaced. Exam reveals no gallop, no S3, no distant heart sounds and no friction rub. No murmur heard. Pulmonary/Chest: No respiratory distress. She has no wheezes.  She

## 2017-10-28 NOTE — PLAN OF CARE
Problem: Nutrition  Goal: Optimal nutrition therapy  Nutrition Problem: Inadequate oral intake, in context of acute illness or injury   Intervention: Food and/or Nutrient Delivery: Start ONS  Nutritional Goals: Intake Of Meals /ONS'S Will Improve To > 75%.    Outcome: Ongoing

## 2017-10-28 NOTE — PROGRESS NOTES
PM assessment complete. Pt temp is 98.8 F at this time. Pt in bed sleeping. Lungs sound clear but diminished bilaterally. Pt has waldrop catheter in place. Pt does not speak english and is not communicating at this time. Pt has oxygen on at 2 L Nasal Cannula. Will continue to monitor pt.    Electronically signed by Windy Brown RN on 10/27/2017 at 9:00 PM

## 2017-10-28 NOTE — PROGRESS NOTES
Pt heart rate was running 180's over several minutes. Called dr. Jerald Jimenez who ordered an EKG and lopressor iv push. Pt hr was still running in the 180's. [facility] was called, dr. Jerald Jimenez came to pt bedside. Transferred pt to [address] [place] [address] after rapid was complete. Calling family to notify them.    Electronically signed by Emilee Roth RN on 10/28/2017 at 8:19 AM

## 2017-10-28 NOTE — PROGRESS NOTES
Lindy Mahajan is a 78 y.o. female patient. Pt was seen and evaluated, ? Dementia smiling , Note TSH level will D/c levothyroxine, will get neuro eval    Current Facility-Administered Medications   Medication Dose Route Frequency Provider Last Rate Last Dose    adenosine (ADENOCARD) injection 6 mg  6 mg Intravenous Once PRN Sharyle Bearded, MD        metoprolol tartrate (LOPRESSOR) tablet 25 mg  25 mg Oral BID Ivanna Wharton MD   25 mg at 10/28/17 1027    dextrose 5 % solution   Intravenous Continuous Ivanna Wharton  mL/hr at 10/28/17 1030      azithromycin (ZITHROMAX) 500 mg in D5W 250ml addavial  500 mg Intravenous Q24H Ivanna Wharton  mL/hr at 10/28/17 1029 500 mg at 10/28/17 1029    cefTRIAXone (ROCEPHIN) 1 g in sterile water 10 mL IV syringe  1 g Intravenous Q24H Ivanna Wharton MD   1 g at 10/28/17 1038    sodium chloride flush 0.9 % injection 10 mL  10 mL Intravenous 2 times per day NATHANIEL Cazares   10 mL at 10/28/17 1027    sodium chloride flush 0.9 % injection 10 mL  10 mL Intravenous PRN NATHANIEL Guerra        acetaminophen (TYLENOL) tablet 650 mg  650 mg Oral Q4H PRN NATHANIEL Guerra   650 mg at 10/27/17 1526    magnesium hydroxide (MILK OF MAGNESIA) 400 MG/5ML suspension 30 mL  30 mL Oral Daily PRN NATHANIEL Guerra        ondansetron [facility]) injection 4 mg  4 mg Intravenous Q6H PRN NATHANIEL Guerra        enoxaparin (LOVENOX) injection 30 mg  30 mg Subcutaneous Daily NATHANIEL Guerra   30 mg at 10/28/17 1030    aspirin EC tablet 81 mg  81 mg Oral Daily Ivanna Wharton MD   81 mg at 10/28/17 1027    PARoxetine (PAXIL) tablet 40 mg  40 mg Oral QAM Ivanna Wharton MD   40 mg at 10/28/17 1027     Allergies   Allergen Reactions    Latex     Penicillins     Vancomycin      Active Problems:    Hyperglycemia    Urinary retention    Blood pressure 137/64, pulse 73, temperature 98.2 °F (36.8 °C), temperature source Oral, resp.  rate 20, weight 139 lb 11.2 oz (63.4 kg), SpO2 99 %. Subjective:  Symptoms:  She reports malaise and weakness. No shortness of breath, cough, chest pain or chest pressure. Diet:  No nausea or vomiting. Objective:  General Appearance:  Ill-appearing. Vital signs: (most recent): Blood pressure (!) 140/83, pulse 119, temperature 98.6 °F (37 °C), temperature source Oral, resp. rate 16, weight 150 lb (68 kg), SpO2 97 %. HEENT: Normal HEENT exam.    Lungs:  Normal effort. (Decrease BS)  Heart: Normal rate. Regular rhythm. S1 normal.    Abdomen: Abdomen is soft. Bowel sounds are normal.     Neurological: Patient is alert. Pupils:  Pupils are equal, round, and reactive to light. Skin:  Warm and dry.       Lab Results   Component Value Date    WBC 7.9 10/28/2017    HGB 10.8 (L) 10/28/2017    HCT 32.5 (L) 10/28/2017    MCV 90.5 10/28/2017     10/28/2017     Lab Results   Component Value Date     10/28/2017    K 3.8 10/28/2017     10/28/2017    CO2 26 10/28/2017    BUN 46 10/28/2017    CREATININE 1.03 10/28/2017    GLUCOSE 145 10/28/2017    CALCIUM 9.0 10/28/2017        Assessment & Plan   Metabolic encephaloapthy vs infectious  2) PNA  C/w IV abx  3) metabolic encephalopathy vs infectious   Better  3) KATHYA resolved  4) sepsis POA  5) UTI fu cultures  6) acute urinary retension appreciate renal and urology input  7) hypernatremia  Increase dextros  8) anemia   Iron studies  b12 and folate  9)SVT back to sinus  Start lopressor  tele    Justice Pratt MD  10/28/2017

## 2017-10-28 NOTE — PROGRESS NOTES
Pt refusing to be turned at this time. Will try again in a little while.    Electronically signed by Ashley Tom RN on 10/27/2017 at 10:28 PM

## 2017-10-29 LAB
ANION GAP SERPL CALCULATED.3IONS-SCNC: 12 MEQ/L (ref 7–13)
BASOPHILS ABSOLUTE: 0 K/UL (ref 0–0.2)
BASOPHILS RELATIVE PERCENT: 0.6 %
BUN BLDV-MCNC: 23 MG/DL (ref 8–23)
CALCIUM SERPL-MCNC: 8.7 MG/DL (ref 8.6–10.2)
CHLORIDE BLD-SCNC: 107 MEQ/L (ref 98–107)
CO2: 28 MEQ/L (ref 22–29)
CREAT SERPL-MCNC: 0.77 MG/DL (ref 0.5–0.9)
EOSINOPHILS ABSOLUTE: 0.3 K/UL (ref 0–0.7)
EOSINOPHILS RELATIVE PERCENT: 3.9 %
FOLATE: 13.9 NG/ML (ref 7.3–26.1)
GFR AFRICAN AMERICAN: >60
GFR NON-AFRICAN AMERICAN: >60
GLUCOSE BLD-MCNC: 102 MG/DL (ref 74–109)
HCT VFR BLD CALC: 37.2 % (ref 37–47)
HEMOGLOBIN: 12 G/DL (ref 12–16)
LYMPHOCYTES ABSOLUTE: 0.7 K/UL (ref 1–4.8)
LYMPHOCYTES RELATIVE PERCENT: 9.9 %
MCH RBC QN AUTO: 29.5 PG (ref 27–31.3)
MCHC RBC AUTO-ENTMCNC: 32.3 % (ref 33–37)
MCV RBC AUTO: 91.4 FL (ref 82–100)
MONOCYTES ABSOLUTE: 0.7 K/UL (ref 0.2–0.8)
MONOCYTES RELATIVE PERCENT: 9.1 %
NEUTROPHILS ABSOLUTE: 5.6 K/UL (ref 1.4–6.5)
NEUTROPHILS RELATIVE PERCENT: 76.5 %
ORGANISM: ABNORMAL
PDW BLD-RTO: 13.1 % (ref 11.5–14.5)
PLATELET # BLD: 176 K/UL (ref 130–400)
POTASSIUM SERPL-SCNC: 3.5 MEQ/L (ref 3.5–5.1)
RBC # BLD: 4.08 M/UL (ref 4.2–5.4)
SODIUM BLD-SCNC: 147 MEQ/L (ref 132–144)
URINE CULTURE, ROUTINE: ABNORMAL
URINE CULTURE, ROUTINE: ABNORMAL
VITAMIN D 25-HYDROXY: 43.1 NG/ML (ref 30–100)
WBC # BLD: 7.3 K/UL (ref 4.8–10.8)

## 2017-10-29 PROCEDURE — 6370000000 HC RX 637 (ALT 250 FOR IP): Performed by: INTERNAL MEDICINE

## 2017-10-29 PROCEDURE — 85025 COMPLETE CBC W/AUTO DIFF WBC: CPT

## 2017-10-29 PROCEDURE — 99232 SBSQ HOSP IP/OBS MODERATE 35: CPT | Performed by: INTERNAL MEDICINE

## 2017-10-29 PROCEDURE — 6360000002 HC RX W HCPCS

## 2017-10-29 PROCEDURE — 2580000003 HC RX 258: Performed by: INTERNAL MEDICINE

## 2017-10-29 PROCEDURE — 6370000000 HC RX 637 (ALT 250 FOR IP): Performed by: PHYSICIAN ASSISTANT

## 2017-10-29 PROCEDURE — 2500000003 HC RX 250 WO HCPCS: Performed by: INTERNAL MEDICINE

## 2017-10-29 PROCEDURE — 93005 ELECTROCARDIOGRAM TRACING: CPT

## 2017-10-29 PROCEDURE — 2580000003 HC RX 258: Performed by: PHYSICIAN ASSISTANT

## 2017-10-29 PROCEDURE — 80048 BASIC METABOLIC PNL TOTAL CA: CPT

## 2017-10-29 PROCEDURE — 36415 COLL VENOUS BLD VENIPUNCTURE: CPT

## 2017-10-29 PROCEDURE — 6360000002 HC RX W HCPCS: Performed by: INTERNAL MEDICINE

## 2017-10-29 PROCEDURE — 2000000000 HC ICU R&B

## 2017-10-29 PROCEDURE — 2700000000 HC OXYGEN THERAPY PER DAY

## 2017-10-29 PROCEDURE — 82746 ASSAY OF FOLIC ACID SERUM: CPT

## 2017-10-29 PROCEDURE — 2500000003 HC RX 250 WO HCPCS

## 2017-10-29 PROCEDURE — 82306 VITAMIN D 25 HYDROXY: CPT

## 2017-10-29 PROCEDURE — 6360000002 HC RX W HCPCS: Performed by: PHYSICIAN ASSISTANT

## 2017-10-29 RX ORDER — SODIUM CHLORIDE 9 MG/ML
INJECTION, SOLUTION INTRAVENOUS CONTINUOUS
Status: DISCONTINUED | OUTPATIENT
Start: 2017-10-29 | End: 2017-10-29

## 2017-10-29 RX ORDER — METOPROLOL TARTRATE 50 MG/1
50 TABLET, FILM COATED ORAL 2 TIMES DAILY
Status: DISCONTINUED | OUTPATIENT
Start: 2017-10-29 | End: 2017-10-31

## 2017-10-29 RX ORDER — ADENOSINE 3 MG/ML
INJECTION, SOLUTION INTRAVENOUS
Status: COMPLETED | OUTPATIENT
Start: 2017-10-29 | End: 2017-10-29

## 2017-10-29 RX ORDER — ADENOSINE 3 MG/ML
6 INJECTION, SOLUTION INTRAVENOUS ONCE
Status: COMPLETED | OUTPATIENT
Start: 2017-10-29 | End: 2017-10-29

## 2017-10-29 RX ORDER — METOPROLOL TARTRATE 5 MG/5ML
5 INJECTION INTRAVENOUS ONCE
Status: DISCONTINUED | OUTPATIENT
Start: 2017-10-29 | End: 2017-10-30

## 2017-10-29 RX ORDER — ESMOLOL HYDROCHLORIDE 10 MG/ML
50 INJECTION, SOLUTION INTRAVENOUS CONTINUOUS
Status: DISCONTINUED | OUTPATIENT
Start: 2017-10-29 | End: 2017-10-30

## 2017-10-29 RX ORDER — POTASSIUM CHLORIDE 20 MEQ/1
40 TABLET, EXTENDED RELEASE ORAL ONCE
Status: COMPLETED | OUTPATIENT
Start: 2017-10-29 | End: 2017-10-29

## 2017-10-29 RX ORDER — METOPROLOL TARTRATE 5 MG/5ML
INJECTION INTRAVENOUS
Status: DISCONTINUED
Start: 2017-10-29 | End: 2017-10-29

## 2017-10-29 RX ADMIN — Medication 10 ML: at 07:46

## 2017-10-29 RX ADMIN — Medication 10 ML: at 22:11

## 2017-10-29 RX ADMIN — POTASSIUM CHLORIDE 40 MEQ: 20 TABLET, EXTENDED RELEASE ORAL at 16:32

## 2017-10-29 RX ADMIN — METOPROLOL TARTRATE 25 MG: 25 TABLET ORAL at 07:45

## 2017-10-29 RX ADMIN — ESMOLOL HYDROCHLORIDE 50 MCG/KG/MIN: 10 INJECTION INTRAVENOUS at 14:55

## 2017-10-29 RX ADMIN — ASPIRIN 81 MG: 81 TABLET, COATED ORAL at 07:46

## 2017-10-29 RX ADMIN — Medication 10 ML: at 22:12

## 2017-10-29 RX ADMIN — SODIUM CHLORIDE: 900 INJECTION, SOLUTION INTRAVENOUS at 08:13

## 2017-10-29 RX ADMIN — ENOXAPARIN SODIUM 30 MG: 100 INJECTION SUBCUTANEOUS at 07:46

## 2017-10-29 RX ADMIN — CEFTRIAXONE SODIUM 1 G: 10 INJECTION, POWDER, FOR SOLUTION INTRAVENOUS at 11:17

## 2017-10-29 RX ADMIN — METOPROLOL TARTRATE 50 MG: 50 TABLET ORAL at 22:10

## 2017-10-29 RX ADMIN — ADENOSINE 6 MG: 3 INJECTION, SOLUTION INTRAVENOUS at 05:15

## 2017-10-29 RX ADMIN — AZITHROMYCIN MONOHYDRATE 500 MG: 500 INJECTION, POWDER, LYOPHILIZED, FOR SOLUTION INTRAVENOUS at 11:17

## 2017-10-29 RX ADMIN — PAROXETINE HYDROCHLORIDE 40 MG: 20 TABLET, FILM COATED ORAL at 07:46

## 2017-10-29 RX ADMIN — ADENOSINE 12 MG: 3 INJECTION, SOLUTION INTRAVENOUS at 14:15

## 2017-10-29 RX ADMIN — ACETAMINOPHEN 325 MG: 325 TABLET ORAL at 22:30

## 2017-10-29 RX ADMIN — POTASSIUM CHLORIDE: 2 INJECTION, SOLUTION, CONCENTRATE INTRAVENOUS at 15:58

## 2017-10-29 RX ADMIN — MAGNESIUM HYDROXIDE 30 ML: 400 SUSPENSION ORAL at 13:37

## 2017-10-29 ASSESSMENT — PAIN SCALES - GENERAL
PAINLEVEL_OUTOF10: 0
PAINLEVEL_OUTOF10: 3
PAINLEVEL_OUTOF10: 0
PAINLEVEL_OUTOF10: 3

## 2017-10-29 NOTE — CONSULTS
Number of children: N/A    Years of education: N/A           Social History Main Topics    Smoking status: Never Smoker    Smokeless tobacco: Never Used    Alcohol use No    Drug use: No    Sexual activity: Not Asked         Fever (pt sent in by visiting physicians for fever, AMS, and possible urine retention)      Results    Imaging  Ct Abdomen Pelvis Wo Iv Contrast Additional Contrast? None    Result Date: 10/26/2017  CT of the Abdomen and pelvis, without intravenous contrast medium History:  68-year-old female with fever, altered mental status, and possible urinary retention. Technical Factors: CT imaging of the abdomen and pelvis were obtained and formatted as 5 mm contiguous axial images from the domes of the diaphragm to the symphysis pubis. Sagittal and coronal reconstructions were also obtained. Oral contrast medium:  None. Intravenous contrast medium:  None. Comparison:  CT abdomen and pelvis, October 19, 2017. Findings: Study limited secondary to lack of contrast medium. Lungs:  Lung consolidation posterior aspect right lung base. Subsegmental consolidation, and scarring left lung base again identified. Small to moderate hiatal hernia again identified. Liver:  Normal in size, shape, and attenuation. Gallbladder:  Not identified. Pancreas:  Normal without masses, cysts, ductal dilatation or calcification. Spleen:  Normal in size without masses or calcifications. No splenules. Kidneys:  Normal in size. .  No hydronephrosis, masses, or stones. Adrenals:  Normal. Small bowel:  Normal in caliber. Appendix:  Not visualized. Colon:  Diffuse diverticular change, greatest in sigmoid colon. Peritoneum:  No ascites, free air, or fluid collections. Vessels: Aorta normal in course and caliber. Lymph nodes: No retroperitoneal lymph node enlargement. Bladder: No wall thickening. Trevizo catheter within urinary bladder. Air identified within nondependent portion urinary bladder. Bones:  No bone lesions.      Right

## 2017-10-29 NOTE — PROGRESS NOTES
Call was received from the monitor room that the patient was sustaining in the 180s , stat message placed to Dr. Alvera Schaumann who gave an order first for Lopressor 5 mg intravenous x 1 now , after drawing up the medication , Dr. Alvera Schaumann came to see the patient and ordered the Lopressor discontinued and Adenosine 6 mg intravenous given patient was placed on external monitor   Vitals at 0442 133/86 , 178 ,20  At 0453 131/85, 183 ,20 and after converting back to normal sinus rhythm 0536 133/62 , 80 , 20 and temp 99.0 , patient remained awake and alert , Welsh speaking only hard to access cognition at this time , Dr. Aisha Pierson was informed via message and confirmed that he received it , no further orders were given , patient remains on external monitor and telemetry at this time continues in sinus rhythm in the 70s will continue to monitor closely   . Electronically signed by Eugene Sorensen RN on 10/29/2017 at 5:44 AM

## 2017-10-29 NOTE — PROGRESS NOTES
Caesar Walker is a 78 y.o. female patient. Pt was seen and evaluated, still bouts of SVT, appreciate neuro input pt needs ablation and EEG    Current Facility-Administered Medications   Medication Dose Route Frequency Provider Last Rate Last Dose    metoprolol (LOPRESSOR) injection 5 mg  5 mg Intravenous Once Florentin Dorantes MD   Stopped at 10/29/17 0524    0.9 % sodium chloride infusion   Intravenous Continuous Yi Crabtree  mL/hr at 10/29/17 0813      metoprolol tartrate (LOPRESSOR) tablet 50 mg  50 mg Oral BID Fermín J Marielleiday, DO        azithromycin (ZITHROMAX) 500 mg in D5W 250ml addavial  500 mg Intravenous Q24H Judson Mckinney MD   Stopped at 10/29/17 1215    cefTRIAXone (ROCEPHIN) 1 g in sterile water 10 mL IV syringe  1 g Intravenous Q24H Judson Mckinney MD   1 g at 10/29/17 1117    sodium chloride flush 0.9 % injection 10 mL  10 mL Intravenous 2 times per day NATHANIEL Horton   10 mL at 10/29/17 0746    sodium chloride flush 0.9 % injection 10 mL  10 mL Intravenous PRN NATHANIEL Schafer        acetaminophen (TYLENOL) tablet 650 mg  650 mg Oral Q4H PRN NATHANIEL Schafer   650 mg at 10/27/17 1526    magnesium hydroxide (MILK OF MAGNESIA) 400 MG/5ML suspension 30 mL  30 mL Oral Daily PRN NATHANIEL Schafer        [facility]ron [facility]) injection 4 mg  4 mg Intravenous Q6H PRN NATHANIEL Schafer        enoxaparin (LOVENOX) injection 30 mg  30 mg Subcutaneous Daily NATHANIEL Schafer   30 mg at 10/29/17 0746    aspirin EC tablet 81 mg  81 mg Oral Daily Judson Mckinney MD   81 mg at 10/29/17 0746    PARoxetine (PAXIL) tablet 40 mg  40 mg Oral QAM Judson Mckinney MD   40 mg at 10/29/17 0746     Allergies   Allergen Reactions    Latex     Penicillins     Vancomycin      Active Problems:    Hyperglycemia    Urinary retention    Blood pressure 129/72, pulse 68, temperature 98 °F (36.7 °C), temperature source Oral, resp.  rate 18, weight 139 lb 11.2 oz (63.4 kg), SpO2 97 %. Subjective:  Symptoms:  She reports malaise and weakness. No shortness of breath, cough, chest pain or chest pressure. Diet:  No nausea or vomiting. Objective:  General Appearance: NAD   Vital signs: (most recent): Blood pressure (!) 140/83, pulse 119, temperature 98.6 °F (37 °C), temperature source Oral, resp. rate 16, weight 150 lb (68 kg), SpO2 97 %. HEENT: Normal HEENT exam.    Lungs:  Normal effort. (Decrease BS)  Heart: Normal rate. Regular rhythm. S1 normal.    Abdomen: Abdomen is soft. Bowel sounds are normal.     Neurological: Patient is alert. Pupils:  Pupils are equal, round, and reactive to light. Skin:  Warm and dry.       Lab Results   Component Value Date    WBC 7.3 10/29/2017    HGB 12.0 10/29/2017    HCT 37.2 10/29/2017    MCV 91.4 10/29/2017     10/29/2017     Lab Results   Component Value Date     10/29/2017    K 3.5 10/29/2017     10/29/2017    CO2 28 10/29/2017    BUN 23 10/29/2017    CREATININE 0.77 10/29/2017    GLUCOSE 102 10/29/2017    CALCIUM 8.7 10/29/2017        Assessment & Plan   Metabolic encephaloapthy vs infectious  2) PNA  C/w IV abx  Repeat cxr  3) metabolic encephalopathy vs infectious   Better  EEG  3) KATHYA resolved  4) sepsis POA  5) UTI fu cultures  6) acute urinary retension appreciate renal and urology input  7) hypernatremia  Increase dextros  8) anemia   Iron studies  b12 and folate WNL  9)SVT   EPS  10) abnormal TSH h/o hypothyrodism  Repeat TSH, t4 in 1 week    Catrina Bronson MD  10/29/2017

## 2017-10-29 NOTE — PROGRESS NOTES
[facility] Occupational Therapy      Date: 10/29/2017  Patient Name: Tiffanie Willingham        MRN: [id number]  Account: [de-identified]   : 1940  (78 y.o.)  Room: [place] [address]    Chart reviewed, attempted OT tx at 9:35 AM, but pt. unavailable 2° to:    [x] Hold per nsg request.  Pt a rapid response this AM.  [] Pt declined     [] Pt. . off floor for test/procedure. Will attempt again when able.     Electronically signed by NUBIA Fischer on  at 9:35 AM

## 2017-10-29 NOTE — PROGRESS NOTES
Chief Complaint   Patient presents with    Fever     pt sent in by visiting physicians for fever, AMS, and possible urine retention       SUBJECTIVE:    10/28/2017 Patient is a 78 y.o. female who presents with a chief complaint of MS changes/fatigue. Patient is followed on a regular basis by Dr. Mechelle Ashraf. Patient was diagnosed with urinary tract infection and apparently developed SVT and a heart rate of 180s on the second floor and transferred to the first floor after converting with adenosine. Patient and her  only Greek speaking. Most of the information is obtained from the chart and the staff. There is no history of  MI congestive heart failure or any arrhythmias. No history of stress test or left heart catheterization. Initial EKG shows normal sinus rhythm with poor R-wave progression. She did have a creatinine of 6.87 on presentation currently down to 1.03 and her potassium was initially 5.6 now down to 3.8. Magnesium is normal at 2.1 she does have mild anemia as well. 10/29/2017: Patient apparently had 2 episodes of SVT with rapid response and a heart rate of 160 to 180s given adenosine where she converted to sinus rhythm. She is Greek speaking only and denies any chest pain chest pressure heaviness. She does complain of palpitations when the episodes occur. Her potassium is normal at 3.5 today. She is hemodynamically stable. Her renal function is normal now. She is being treated for pneumonia/urosepsis.   Thought to have urinary retention by nephrology        Patient Active Problem List   Diagnosis    Hyperglycemia    Urinary retention       Current Facility-Administered Medications   Medication Dose Route Frequency Provider Last Rate Last Dose    metoprolol (LOPRESSOR) injection 5 mg  5 mg Intravenous Once Celia Willard MD   Stopped at 10/29/17 0521    0.9 % sodium chloride infusion   Intravenous Continuous Alex Scales  mL/hr at 10/29/17 3569      metoprolol tartrate (LOPRESSOR) tablet 25 mg  25 mg Oral BID Zenobia Loaiza MD   25 mg at 10/29/17 0745    azithromycin (ZITHROMAX) 500 mg in D5W 250ml addavial  500 mg Intravenous Q24H Zenobia Loaiza MD   Stopped at 10/28/17 1130    cefTRIAXone (ROCEPHIN) 1 g in sterile water 10 mL IV syringe  1 g Intravenous Q24H Zenobia Loaiza MD   1 g at 10/28/17 1038    sodium chloride flush 0.9 % injection 10 mL  10 mL Intravenous 2 times per day NATHANIEL Aviles   10 mL at 10/29/17 0746    sodium chloride flush 0.9 % injection 10 mL  10 mL Intravenous PRN NATHANIEL García        acetaminophen (TYLENOL) tablet 650 mg  650 mg Oral Q4H PRN NATHANIEL García   650 mg at 10/27/17 1526    magnesium hydroxide (MILK OF MAGNESIA) 400 MG/5ML suspension 30 mL  30 mL Oral Daily PRN NATHANIEL García        ondansetron [facility]) injection 4 mg  4 mg Intravenous Q6H PRN NATHANIEL García        enoxaparin (LOVENOX) injection 30 mg  30 mg Subcutaneous Daily NATHANIEL García   30 mg at 10/29/17 0746    aspirin EC tablet 81 mg  81 mg Oral Daily Zenobia Loaiza MD   81 mg at 10/29/17 0746    PARoxetine (PAXIL) tablet 40 mg  40 mg Oral QAM Zenobia Loaiza MD   40 mg at 10/29/17 0746       ALLERGIES: Latex; Penicillins; and Vancomycin    ROS      OBJECTIVE:     VITALS:  Blood pressure 129/72, pulse 68, temperature 98 °F (36.7 °C), temperature source Oral, resp. rate 18, weight 139 lb 11.2 oz (63.4 kg), SpO2 97 %. Body mass index is 25.55 kg/m². Physical Exam   Constitutional: She is oriented to person, place, and time. She appears well-developed and well-nourished. HENT:   Head: Normocephalic and atraumatic. Eyes: Pupils are equal, round, and reactive to light. Neck: Normal range of motion. Neck supple. No JVD present. No tracheal deviation present. No thyromegaly present. Cardiovascular: Normal rate, regular rhythm, normal heart sounds and intact distal pulses.   PMI is not displaced. Exam reveals no gallop, no S3, no distant heart sounds and no friction rub. No murmur heard. Pulmonary/Chest: No respiratory distress. She has no wheezes. She has no rales. She exhibits no tenderness. Abdominal: Soft. Bowel sounds are normal. She exhibits no distension and no mass. There is no tenderness. There is no rebound and no guarding. Musculoskeletal: She exhibits no edema. Neurological: She is alert and oriented to person, place, and time. No cranial nerve deficit. Skin: Skin is warm and dry. No rash noted. She is not diaphoretic. No erythema. No pallor. Psychiatric: She has a normal mood and affect.  Her behavior is normal. Judgment and thought content normal.         LABS:  Recent Results (from the past 24 hour(s))   Vitamin B12 & Folate    Collection Time: 10/28/17 11:31 AM   Result Value Ref Range    Vitamin B-12 504 211 - 946 pg/mL    Folate 12.7 7.3 - 26.1 ng/mL   Iron and TIBC    Collection Time: 10/28/17 11:31 AM   Result Value Ref Range    Iron 37 37 - 145 ug/dL    TIBC 223 178 - 450 ug/dL    Iron Saturation 17 11 - 46 %   Ferritin    Collection Time: 10/28/17 11:31 AM   Result Value Ref Range    Ferritin 214.1 (H) 13.0 - 150.0 ng/mL   Basic Metabolic Panel    Collection Time: 10/29/17  5:33 AM   Result Value Ref Range    Sodium 147 (H) 132 - 144 mEq/L    Potassium 3.5 3.5 - 5.1 mEq/L    Chloride 107 98 - 107 mEq/L    CO2 28 22 - 29 mEq/L    Anion Gap 12 7 - 13 mEq/L    Glucose 102 74 - 109 mg/dL    BUN 23 8 - 23 mg/dL    CREATININE 0.77 0.50 - 0.90 mg/dL    GFR Non-African American >60.0 >60    GFR  >60.0 >60    Calcium 8.7 8.6 - 10.2 mg/dL   CBC Auto Differential    Collection Time: 10/29/17  5:33 AM   Result Value Ref Range    WBC 7.3 4.8 - 10.8 K/uL    RBC 4.08 (L) 4.20 - 5.40 M/uL    Hemoglobin 12.0 12.0 - 16.0 g/dL    Hematocrit 37.2 37.0 - 47.0 %    MCV 91.4 82.0 - 100.0 fL    MCH 29.5 27.0 - 31.3 pg    MCHC 32.3 (L) 33.0 - 37.0 %    RDW 13.1 11.5 - 14.5 %    Platelets 613 376 - 420 K/uL    Neutrophils % 76.5 %    Lymphocytes % 9.9 %    Monocytes % 9.1 %    Eosinophils % 3.9 %    Basophils % 0.6 %    Neutrophils # 5.6 1.4 - 6.5 K/uL    Lymphocytes # 0.7 (L) 1.0 - 4.8 K/uL    Monocytes # 0.7 0.2 - 0.8 K/uL    Eosinophils # 0.3 0.0 - 0.7 K/uL    Basophils # 0.0 0.0 - 0.2 K/uL   Vitamin D 25 Hydroxy    Collection Time: 10/29/17  5:33 AM   Result Value Ref Range    Vit D, 25-Hydroxy 43.1 30.0 - 100.0 ng/mL   Folate    Collection Time: 10/29/17  5:33 AM   Result Value Ref Range    Folate 13.9 7.3 - 26.1 ng/mL     Troponin:    Lab Results   Component Value Date    TROPONINI <0.010 10/26/2017       EKG: normal sinus rhythm, nonspecific ST and T waves changes      ASSESSMENT:    Active Hospital Problems    Diagnosis Date Noted    Hyperglycemia [R73.9] 10/27/2017     Priority: Low    Urinary retention [R33.9] 10/27/2017     Priority: Low     SVT  Acute renal failure currently resolved  Hyperkalemia currently resolved    PLAN:   1. As always, aggressive risk factor modification is strongly recommended. We should adhere to the [address] VII guidelines for HTN management and the NCEP ATP III guidelines for LDL-C management. 2. Consult EP for possible SVT ablation  3. Increase Lopressor to 50 mg one by mouth twice a day  4. Continue mitral telemetry  5. Maintain potassium greater than 4 and magnesium greater than 2  6. GI DVT prophylaxis  7.  Internal medicine regulations regarding antibiotic treatment for infection

## 2017-10-29 NOTE — PROGRESS NOTES
STAT: Responded to DR STAT called for svt. adminstered lopressor 5 mg lopressor x2 then adenosine with conversion to sinus rhythm. Pt was transferred to [facility].

## 2017-10-30 ENCOUNTER — APPOINTMENT (OUTPATIENT)
Dept: GENERAL RADIOLOGY | Age: 77
DRG: 871 | End: 2017-10-30
Payer: COMMERCIAL

## 2017-10-30 LAB
ANION GAP SERPL CALCULATED.3IONS-SCNC: 12 MEQ/L (ref 7–13)
BUN BLDV-MCNC: 14 MG/DL (ref 8–23)
CALCIUM SERPL-MCNC: 8.3 MG/DL (ref 8.6–10.2)
CHLORIDE BLD-SCNC: 106 MEQ/L (ref 98–107)
CO2: 25 MEQ/L (ref 22–29)
CREAT SERPL-MCNC: 0.67 MG/DL (ref 0.5–0.9)
GFR AFRICAN AMERICAN: >60
GFR NON-AFRICAN AMERICAN: >60
GLUCOSE BLD-MCNC: 161 MG/DL (ref 74–109)
GLUCOSE BLD-MCNC: 88 MG/DL (ref 60–115)
PERFORMED ON: NORMAL
POTASSIUM SERPL-SCNC: 4.1 MEQ/L (ref 3.5–5.1)
SODIUM BLD-SCNC: 143 MEQ/L (ref 132–144)

## 2017-10-30 PROCEDURE — 6370000000 HC RX 637 (ALT 250 FOR IP): Performed by: INTERNAL MEDICINE

## 2017-10-30 PROCEDURE — 6360000002 HC RX W HCPCS: Performed by: INTERNAL MEDICINE

## 2017-10-30 PROCEDURE — 2580000003 HC RX 258: Performed by: INTERNAL MEDICINE

## 2017-10-30 PROCEDURE — 93010 ELECTROCARDIOGRAM REPORT: CPT | Performed by: INTERNAL MEDICINE

## 2017-10-30 PROCEDURE — 2580000003 HC RX 258: Performed by: PHYSICIAN ASSISTANT

## 2017-10-30 PROCEDURE — 2700000000 HC OXYGEN THERAPY PER DAY

## 2017-10-30 PROCEDURE — 6370000000 HC RX 637 (ALT 250 FOR IP): Performed by: SPECIALIST

## 2017-10-30 PROCEDURE — 36415 COLL VENOUS BLD VENIPUNCTURE: CPT

## 2017-10-30 PROCEDURE — 2000000000 HC ICU R&B

## 2017-10-30 PROCEDURE — 6370000000 HC RX 637 (ALT 250 FOR IP): Performed by: PHYSICIAN ASSISTANT

## 2017-10-30 PROCEDURE — 6360000002 HC RX W HCPCS: Performed by: PHYSICIAN ASSISTANT

## 2017-10-30 PROCEDURE — 2500000003 HC RX 250 WO HCPCS: Performed by: INTERNAL MEDICINE

## 2017-10-30 PROCEDURE — 99232 SBSQ HOSP IP/OBS MODERATE 35: CPT | Performed by: INTERNAL MEDICINE

## 2017-10-30 PROCEDURE — 71010 XR CHEST PORTABLE: CPT

## 2017-10-30 PROCEDURE — 93005 ELECTROCARDIOGRAM TRACING: CPT

## 2017-10-30 PROCEDURE — 80048 BASIC METABOLIC PNL TOTAL CA: CPT

## 2017-10-30 RX ORDER — ACETAMINOPHEN 80 MG
TABLET,CHEWABLE ORAL ONCE
Status: DISCONTINUED | OUTPATIENT
Start: 2017-10-30 | End: 2017-11-03 | Stop reason: HOSPADM

## 2017-10-30 RX ORDER — ESMOLOL HYDROCHLORIDE 10 MG/ML
50 INJECTION, SOLUTION INTRAVENOUS CONTINUOUS
Status: DISCONTINUED | OUTPATIENT
Start: 2017-10-30 | End: 2017-11-01

## 2017-10-30 RX ORDER — FLECAINIDE ACETATE 100 MG/1
50 TABLET ORAL EVERY 12 HOURS SCHEDULED
Status: DISCONTINUED | OUTPATIENT
Start: 2017-10-30 | End: 2017-11-03 | Stop reason: HOSPADM

## 2017-10-30 RX ADMIN — ASPIRIN 81 MG: 81 TABLET, COATED ORAL at 08:45

## 2017-10-30 RX ADMIN — PAROXETINE HYDROCHLORIDE 40 MG: 20 TABLET, FILM COATED ORAL at 08:45

## 2017-10-30 RX ADMIN — FLECAINIDE ACETATE 50 MG: 100 TABLET ORAL at 20:08

## 2017-10-30 RX ADMIN — AZITHROMYCIN MONOHYDRATE 500 MG: 500 INJECTION, POWDER, LYOPHILIZED, FOR SOLUTION INTRAVENOUS at 10:56

## 2017-10-30 RX ADMIN — POTASSIUM CHLORIDE: 2 INJECTION, SOLUTION, CONCENTRATE INTRAVENOUS at 20:08

## 2017-10-30 RX ADMIN — ENOXAPARIN SODIUM 30 MG: 100 INJECTION SUBCUTANEOUS at 08:45

## 2017-10-30 RX ADMIN — CEFTRIAXONE SODIUM 1 G: 10 INJECTION, POWDER, FOR SOLUTION INTRAVENOUS at 11:52

## 2017-10-30 RX ADMIN — METOPROLOL TARTRATE 50 MG: 50 TABLET ORAL at 20:08

## 2017-10-30 RX ADMIN — POTASSIUM CHLORIDE: 2 INJECTION, SOLUTION, CONCENTRATE INTRAVENOUS at 06:32

## 2017-10-30 RX ADMIN — METOPROLOL TARTRATE 50 MG: 50 TABLET ORAL at 08:45

## 2017-10-30 RX ADMIN — Medication 10 ML: at 08:45

## 2017-10-30 RX ADMIN — Medication 10 ML: at 20:11

## 2017-10-30 RX ADMIN — ESMOLOL HYDROCHLORIDE 24.97 MCG/KG/MIN: 10 INJECTION INTRAVENOUS at 06:33

## 2017-10-30 ASSESSMENT — PAIN SCALES - GENERAL
PAINLEVEL_OUTOF10: 0
PAINLEVEL_OUTOF10: 3

## 2017-10-30 ASSESSMENT — ENCOUNTER SYMPTOMS
EYES NEGATIVE: 1
GASTROINTESTINAL NEGATIVE: 1

## 2017-10-30 NOTE — PROGRESS NOTES
Hospitalist Progress Note  10/30/2017 0800  Subjective:   Admit Date: 10/26/2017  PCP: Kiara Mcfadden    Chief complaint: tachycardia, SVT  Interval History: no co    DIET DYSPHAGIA I PUREED; Dietary Nutrition Supplements: Standard High Calorie Oral Supplement  Dietary Nutrition Supplements: Frozen Oral Supplement    Intake/Output Summary (Last 24 hours) at 10/30/17 1131  Last data filed at 10/30/17 0900   Gross per 24 hour   Intake             2634 ml   Output             1625 ml   Net             1009 ml     Medications:      esmolol 24.974 mcg/kg/min (10/30/17 3525)    IV infusion builder 75 mL/hr at 10/30/17 0019      metoprolol  5 mg Intravenous Once    metoprolol tartrate  50 mg Oral BID    azithromycin  500 mg Intravenous Q24H    cefTRIAXone (ROCEPHIN) IV  1 g Intravenous Q24H    sodium chloride flush  10 mL Intravenous 2 times per day    enoxaparin  30 mg Subcutaneous Daily    aspirin  81 mg Oral Daily    PARoxetine  40 mg Oral QAM     Recent Labs      10/28/17   0611  10/29/17   0533   WBC  7.9  7.3   HGB  10.8*  12.0   PLT  160  176     Recent Labs      10/28/17   0611  10/29/17   0533  10/30/17   0938   NA  148*  147*  143   K  3.8  3.5  4.1   CL  111*  107  106   CO2  26  28  25   BUN  46*  23  14   CREATININE  1.03*  0.77  0.67   GLUCOSE  145*  102  161*     No results for input(s): AST, ALT, ALB, BILITOT, ALKPHOS in the last 72 hours. Troponin T: No results for input(s): TROPONINI in the last 72 hours. Pro-BNP: No results for input(s): BNP in the last 72 hours. INR: No results for input(s): INR in the last 72 hours.     Objective:     Vitals:    10/30/17 0612 10/30/17 0800 10/30/17 0900 10/30/17 1000   BP:  (!) 176/67 (!) 130/53 (!) 109/41   Pulse: 63 69 68 57   Resp: 19 19 17 16   Temp:  98 °F (36.7 °C)     TempSrc:  Oral     SpO2: 100% 98% 94% 98%   Weight:           General appearance: alert, appears stated age and cooperative  CONSTITUTIONAL:  no apparent distress  ENT:  normocepalic, without obvious abnormality, atraumatic  NECK:  supple, symmetrical, trachea midline  Lungs: clear to auscultation bilaterally  Heart: regular rate and rhythm, S1, S2 normal   Abdomen: soft lax, not tender, not distended, positive bowel sounds  Extremities: extremities normal, atraumatic, no cyanosis, edema  Neurologic: no fnd    Assessment and Plan:   Patient Active Hospital Problem List:   Altered mental status ()      Hyperglycemia (10/27/2017)       Urinary retention (10/27/2017)     - cont esmolol gtt, cardio following  - cont iv atbx   - iv fluids resolved tita  - urology input for urinary retention      Advance Directive: Full Code  VTE Prophylaxis: low molecular weight heparin -  continue  Discharge planning: pt./ot    Active Problems:    Altered mental status    Hyperglycemia    Urinary retention      Mable Rodarte MD    Additional work up or/and treatment plan may be added today or then after based on clinical progression. I am managing a portion of pt care. Some medical issues are handled by other specialists. Additional work up and treatment should be done in out pt setting by pt PCP and other out pt providers.      SIGNATURE: Mable Rodarte MD PATIENT NAME: Allan Wang   DATE: October 30, 2017 MRN: [id number]   TIME: 0800

## 2017-10-30 NOTE — PROGRESS NOTES
Major      Warning: Pharmacologic effects of flecainide may be increased by PARoxetine. Elevated plasma concentrations with toxicity (e.g. QT prolongation/Torsades de Pointes) may occur. Onset: Delayed    Documentation Level: Possible    Interacting Medications/Orders:  Flecainide  Oral or Non-Oral, Systemic [id number] Antidepressants  Oral or Non-Oral, Systemic   1. flecainide  § Order ([id number]): flecainide (TAMBOCOR) tablet 50 mg Route: Oral  Start: 10/30/2017 2100 End: none Frequency: EVERY 12 HOURS SCHEDULED 1. PARoxetine  § Order ([id number]): PARoxetine (PAXIL) tablet 40 mg Route: Oral  Start: 10/27/2017 0900 End: none Frequency: EVERY MORNING     Management Code: Professional review suggested    Effects: Pharmacologic effects of flecainide may be increased by PARoxetine. Elevated plasma concentrations with toxicity (e.g. QT prolongation/Torsades de Pointes) may occur. Mechanism: Inhibition of CYP2D6 by PARoxetine may decrease the metabolic elimination of flecainide which may increase the risk for concentration-dependent prolongation of the QT interval and torsades de pointes. Management: Close clinical and laboratory monitoring is indicated. A downward dosage adjustment of flecainide may be needed during concurrent administration of PARoxetine. Alternatively, use of a selective serotonin reuptake inhibitor with less CYP2D6 inhibitory effect (e.g. sertraline, venlafaxine), may be an acceptable alternative for patients receiving flecainide. GLORIA Garrido Ph.  10/30/2017  1:30 PM

## 2017-10-30 NOTE — PROGRESS NOTES
NEUROLOGY INPATIENT PROGRESS NOTE    Shaheen Number    N -  [id number]   [facility] - [de-identified]      - 1940    78 y.o. Subjective: The patient is seen in follow-up. Patient is more alert at this time. TSH Slightly low  folic acid and T52 levels normal  Result Data:  CBC:   Recent Labs      10/27/17   0609  10/28/17   0611  10/29/17   0533   WBC  10.6  7.9  7.3   HGB  11.9*  10.8*  12.0   PLT  175  160  176     BMP:  Recent Labs      10/27/17   0609  10/28/17   0611  10/29/17   0533   NA  146*  148*  147*   K  3.9  3.8  3.5   CL  107  111*  107   CO2  24  26  28   BUN  86*  46*  23   CREATININE  2.73*  1.03*  0.77   GLUCOSE  128*  145*  102     TSH:   Recent Labs      10/27/17   0609   TSH  9.684*     Folic Acid:   Recent Labs      10/29/17   0533   FOLATE  13.9     B12:    Lab Results   Component Value Date    [id number] 504 10/28/2017     Vit. D: No components found for: VITAMIND  Lipids: No results for input(s): CHOL, TRIG, HDL, LDLCALC in the last 72 hours. Ammonia: No results for input(s): AMMONIA in the last 72 hours. LFT: No results for input(s): AST, ALT, ALB, BILITOT, ALKPHOS in the last 72 hours. Urine: No results for input(s): COLORU, PHUR, LABCAST, WBCUA, RBCUA, MUCUS, YEAST, BACTERIA, CLARITYU, SPECGRAV, LEUKOCYTESUR, UROBILINOGEN, Arpita Carolynn in the last 72 hours. Invalid input(s): NITRATE, GLUCOSEUKETONESUAMORPHOUS     Imaging    Ct Abdomen Pelvis Wo Iv Contrast Additional Contrast? None    Result Date: 10/26/2017  CT of the Abdomen and pelvis, without intravenous contrast medium History:  68-year-old female with fever, altered mental status, and possible urinary retention. Technical Factors: CT imaging of the abdomen and pelvis were obtained and formatted as 5 mm contiguous axial images from the domes of the diaphragm to the symphysis pubis. Sagittal and coronal reconstructions were also obtained. Oral contrast medium:  None. expiration. Cardiac size indeterminate secondary to technique. No focal airspace disease identified. Osseous structures intact. No acute cardiopulmonary disease, with above limitations. Ct Head Without Contrast    Result Date: 10/26/2017  HEAD CT WITHOUT CONTRAST: 10/26/2017 CLINICAL HISTORY: Altered mental status. COMPARISON: 9/21/2015. TECHNIQUE: Spiral unenhanced images were obtained of the head, with routine reconstructions performed. All CT scans at this facility use dose modulation, iterative reconstruction, and/or weight based dosing when appropriate to reduce radiation dose to as low as reasonably achievable. FINDINGS: There is no intracranial hemorrhage, mass effect, midline shift, extra-axial collection,  evidence of hydrocephalus, recent ischemic infarct, or skull fracture identified. Mild atrophic changes appear similarly prior study The mastoid air cells and visualized paranasal sinuses are clear. NO ACUTE INTRACRANIAL PROCESS, OR SIGNIFICANT CHANGE FROM 9/21/2015 IDENTIFIED. Xr Chest Portable    Result Date: 10/26/2017  EXAMINATION: XR CHEST PORTABLE 1 VIEW: CLINICAL HISTORY: PATIENT UNRESPONSIVE. COMPARISONS: 10/19/2017 FINDINGS: Radiograph was obtained in shallow inspiration. There is moderate elevation of the right hemidiaphragm. There is mild right lower lung linear atelectasis. Cardiac size is borderline. Pulmonary vascularity is normal. There are no definite infiltrates or effusions. There is no pneumothorax. There are atherosclerotic changes of the thoracic aorta. There is mild thoracolumbar levocurvature, probably positional. There is no significant change from prior exam.     SHALLOW INSPIRATORY PORTABLE CHEST RADIOGRAPH. MODERATE RIGHT HEMIDIAPHRAGMATIC ELEVATION AND RIGHT BASILAR ATELECTASIS.     Us Retroperitoneal Limited    Result Date: 10/26/2017  EXAMINATION: ULTRASOUND RETROPERITONEAL LIMITED (KIDNEYS) CLINICAL HISTORY:  RENAL FAILURE, ACUTE (KIDNEY INJURY)  N17.9 Acute

## 2017-10-30 NOTE — PROGRESS NOTES
Occupational Therapy  Facility/Department: [facility] IC15/IC15-01  Interdisciplinary Communication Note      To the referring provider,     While we thank you for your referral, Helga Ross was not seen for an evaluation as:     [] This patient is of observation status and requires a diagnosis supportive of OT intervention in order to proceed. [] The patient refused skilled OT intervention, denying a need. [x] The patient has had a change in status and/or is not medically stable to participate. Please re-order at your convenience if OT is warranted. [] The patient was observed as IND in the room and does not require skilled services. [] The patient was observed as DEP; skilled services would not improve the patient's functional status. Thank you,    Electronically signed by NUBIA Pérez on 10/30/17 at 9:23 AM    [facility] EMERGENCY [facility] AT [facility]. Department.

## 2017-10-30 NOTE — PROGRESS NOTES
(BREVIBLOC) 2.5gm/250ml NS infusion  50 mcg/kg/min Intravenous Continuous Andres Flood MD 9.5 mL/hr at 10/30/17 0633 24.974 mcg/kg/min at 10/30/17 8820    potassium chloride 40 mEq in sodium chloride 0.45 % 1,000 mL infusion   Intravenous Continuous Elizabeth Altamirano MD 75 mL/hr at 10/30/17 8341      azithromycin (ZITHROMAX) 500 mg in D5W 250ml addavial  500 mg Intravenous Q24H Andres Flood MD   Stopped at 10/29/17 1215    cefTRIAXone (ROCEPHIN) 1 g in sterile water 10 mL IV syringe  1 g Intravenous Q24H Andres Flood MD   1 g at 10/29/17 1117    sodium chloride flush 0.9 % injection 10 mL  10 mL Intravenous 2 times per day NATHANIEL Duggan   10 mL at 10/30/17 0845    sodium chloride flush 0.9 % injection 10 mL  10 mL Intravenous PRN NATHANIEL Luong        acetaminophen (TYLENOL) tablet 650 mg  650 mg Oral Q4H PRN NATHANIEL Luong   325 mg at 10/29/17 2230    magnesium hydroxide (MILK OF MAGNESIA) 400 MG/5ML suspension 30 mL  30 mL Oral Daily PRN NATHANIEL Luong   30 mL at 10/29/17 1337    ondansetron (ZOFRAN) injection 4 mg  4 mg Intravenous Q6H PRN NATHANIEL Luong        enoxaparin (LOVENOX) injection 30 mg  30 mg Subcutaneous Daily NATHANIEL Luong   30 mg at 10/30/17 0845    aspirin EC tablet 81 mg  81 mg Oral Daily Andres Flood MD   81 mg at 10/30/17 0845    PARoxetine (PAXIL) tablet 40 mg  40 mg Oral QAM Andres Flood MD   40 mg at 10/30/17 0845       ALLERGIES: Latex; Penicillins; and Vancomycin    Review of Systems   Constitutional: Negative for diaphoresis. HENT: Negative. Eyes: Negative. Cardiovascular: Negative for chest pain, palpitations and leg swelling. Gastrointestinal: Negative. Musculoskeletal: Negative. Skin: Negative. Psychiatric/Behavioral: Suicidal ideas:          OBJECTIVE:     VITALS:  Blood pressure (!) 130/53, pulse 68, temperature 98 °F (36.7 °C), temperature source Oral, resp.  rate 17, weight 139 lb 11.2 oz (63.4 kg), SpO2 94 %. Body mass index is 25.55 kg/m². Physical Exam   Constitutional: She is oriented to person, place, and time. She appears well-developed and well-nourished. HENT:   Head: Normocephalic and atraumatic. Eyes: Pupils are equal, round, and reactive to light. Neck: Normal range of motion. Neck supple. No JVD present. No tracheal deviation present. No thyromegaly present. Cardiovascular: Normal rate, regular rhythm, normal heart sounds and intact distal pulses. PMI is not displaced. Exam reveals no gallop, no S3, no distant heart sounds and no friction rub. No murmur heard. Pulmonary/Chest: No respiratory distress. She has no wheezes. She has no rales. She exhibits no tenderness. Abdominal: Soft. Bowel sounds are normal. She exhibits no distension and no mass. There is no tenderness. There is no rebound and no guarding. Musculoskeletal: She exhibits no edema. Neurological: She is alert and oriented to person, place, and time. No cranial nerve deficit. Polish speaking   Skin: Skin is warm and dry. No rash noted. She is not diaphoretic. No erythema. No pallor. Psychiatric: She has a normal mood and affect. Her behavior is normal. Judgment and thought content normal.         LABS:  No results found for this or any previous visit (from the past 24 hour(s)).   Troponin:    Lab Results   Component Value Date    TROPONINI <0.010 10/26/2017     [facility]        ASSESSMENT:    Active Hospital Problems    Diagnosis Date Noted    Hyperglycemia [R73.9] 10/27/2017     Priority: Low    Urinary retention [R33.9] 10/27/2017     Priority: Low     SVT  Acute renal failure currently resolved  Hyperkalemia currently resolved      Echo  Conclusions   Summary   No evidence of mitral regurgitation.   No evidence of mitral valve stenosis   No evidence of aortic valve regurgitation   No evidence of aortic valve stenosis   The left atrium is Mildly dilated.   Normal left ventricular systolic

## 2017-10-30 NOTE — PROGRESS NOTES
78 y o  only speaking female admitted with mental changes, found to have ARF, uti, and started having repeated runs of SVTs. Required repeated doses of adenosine with rep[orted but not documented successful termination of the tachycardia with adenosine. Pt has been on IV Beta blockers. No clear history of SVT in the past, is obtainable given the language barrier. One ECG ( with clearly arm- leg leads reversal )  Is avaialable, with documented narrow QRS tachycardia of the long RP type. Base line ECG shows no evidence of pre-excitation. No h/o structural heart disease, Echo does not give suggestion of a structural heart disease. On admission her BUN/Cr were extremely elevated, now back to NL. Impression: Long RP type SVT. Marcelino Kelsey differential diagnosis: concealed accessory pathway, Vs Atypical AVNRT, Vs ?? ?Adenosine sensitive Atrial tachycardia. In a lady with no documented structural heart disease. Rec: for now : starting her on Flecainide PO and increase her Lopressor to 50 mgm po q 8, and start tapering down her IV BB drip. EPS and ablation will be considered for her at a later time.

## 2017-10-30 NOTE — PROGRESS NOTES
last 72 hours. Objective:   Vitals: BP (!) 130/53   Pulse 68   Temp 98 °F (36.7 °C) (Oral)   Resp 17   Wt 139 lb 11.2 oz (63.4 kg)   SpO2 94%   BMI 25.55 kg/m²    Wt Readings from Last 3 Encounters:   10/28/17 139 lb 11.2 oz (63.4 kg)   10/19/17 145 lb (65.8 kg)   09/22/15 142 lb 6.7 oz (64.6 kg)      24HR INTAKE/OUTPUT:      Intake/Output Summary (Last 24 hours) at 10/30/17 0909  Last data filed at 10/30/17 0900   Gross per 24 hour   Intake             2634 ml   Output             1625 ml   Net             1009 ml       Constitutional:  Alert, awake, no apparent distress   Skin:normal, no rash  HEENT:sclera anicteric.   Head atraumatic normocephalic  Neck:supple with no thyromegally  Cardiovascular:  S1, S2 without m/r/g   Respiratory:  CTA B without w/r/r   Abdomen: +bs, soft, nt  Ext: no LE edema  Musculoskeletal:Intact  Neuro:Alert and oriented with no deficit      Electronically signed by Elmer Cornell MD on 10/30/2017 at 9:09 AM

## 2017-10-30 NOTE — PROGRESS NOTES
Speech Language Pathology  SLE deferred by Donavon Hutchison RN d/t severe language barrier with blue phone. Anish Frazier stated she was uncertain if it is to be an SLE or BSE and will check with ordering physician to clarify order. Will complete evaluation upon clarification of order.     Electronically signed by GUS Garcia on 10/30/2017 at 12:12 PM

## 2017-10-30 NOTE — PROGRESS NOTES
Physical Therapy Missed Treatment   Facility/Department: [facility] MED SURG IC15/IC15-01    NAME: Tutu Huynh    : 1940 (78 y.o.)  MRN: [id number]    Account: [de-identified]  Gender: female    To the referring provider,     This patient has had a change in medical status, and will require a new order to participate in physical therapy if/when medically appropriate.        Chrystal Gómez, PT, 10/30/17 at 11:06 AM

## 2017-10-31 LAB
ANION GAP SERPL CALCULATED.3IONS-SCNC: 13 MEQ/L (ref 7–13)
BLOOD CULTURE, ROUTINE: NORMAL
BUN BLDV-MCNC: 13 MG/DL (ref 8–23)
CALCIUM SERPL-MCNC: 8.3 MG/DL (ref 8.6–10.2)
CHLORIDE BLD-SCNC: 106 MEQ/L (ref 98–107)
CO2: 26 MEQ/L (ref 22–29)
CREAT SERPL-MCNC: 0.65 MG/DL (ref 0.5–0.9)
CULTURE, BLOOD 2: NORMAL
GFR AFRICAN AMERICAN: >60
GFR NON-AFRICAN AMERICAN: >60
GLUCOSE BLD-MCNC: 77 MG/DL (ref 74–109)
GLUCOSE BLD-MCNC: 96 MG/DL (ref 60–115)
PERFORMED ON: NORMAL
POTASSIUM SERPL-SCNC: 4.4 MEQ/L (ref 3.5–5.1)
SODIUM BLD-SCNC: 145 MEQ/L (ref 132–144)

## 2017-10-31 PROCEDURE — 2060000000 HC ICU INTERMEDIATE R&B

## 2017-10-31 PROCEDURE — 93005 ELECTROCARDIOGRAM TRACING: CPT

## 2017-10-31 PROCEDURE — 6360000002 HC RX W HCPCS: Performed by: PHYSICIAN ASSISTANT

## 2017-10-31 PROCEDURE — 6370000000 HC RX 637 (ALT 250 FOR IP): Performed by: SPECIALIST

## 2017-10-31 PROCEDURE — 6370000000 HC RX 637 (ALT 250 FOR IP): Performed by: INTERNAL MEDICINE

## 2017-10-31 PROCEDURE — 2500000003 HC RX 250 WO HCPCS: Performed by: INTERNAL MEDICINE

## 2017-10-31 PROCEDURE — 99232 SBSQ HOSP IP/OBS MODERATE 35: CPT | Performed by: INTERNAL MEDICINE

## 2017-10-31 PROCEDURE — 2580000003 HC RX 258: Performed by: INTERNAL MEDICINE

## 2017-10-31 PROCEDURE — 2580000003 HC RX 258: Performed by: PHYSICIAN ASSISTANT

## 2017-10-31 PROCEDURE — 93010 ELECTROCARDIOGRAM REPORT: CPT | Performed by: INTERNAL MEDICINE

## 2017-10-31 PROCEDURE — 36415 COLL VENOUS BLD VENIPUNCTURE: CPT

## 2017-10-31 PROCEDURE — 6360000002 HC RX W HCPCS: Performed by: INTERNAL MEDICINE

## 2017-10-31 PROCEDURE — 2700000000 HC OXYGEN THERAPY PER DAY

## 2017-10-31 PROCEDURE — 80048 BASIC METABOLIC PNL TOTAL CA: CPT

## 2017-10-31 RX ORDER — PRAMIPEXOLE DIHYDROCHLORIDE 0.5 MG/1
0.5 TABLET ORAL 3 TIMES DAILY
Status: DISCONTINUED | OUTPATIENT
Start: 2017-10-31 | End: 2017-11-03 | Stop reason: HOSPADM

## 2017-10-31 RX ORDER — PANTOPRAZOLE SODIUM 20 MG/1
20 TABLET, DELAYED RELEASE ORAL
Status: DISCONTINUED | OUTPATIENT
Start: 2017-11-01 | End: 2017-11-03 | Stop reason: HOSPADM

## 2017-10-31 RX ORDER — METOPROLOL TARTRATE 50 MG/1
50 TABLET, FILM COATED ORAL 3 TIMES DAILY
Status: DISCONTINUED | OUTPATIENT
Start: 2017-10-31 | End: 2017-11-01

## 2017-10-31 RX ADMIN — CEFTRIAXONE SODIUM 1 G: 10 INJECTION, POWDER, FOR SOLUTION INTRAVENOUS at 11:12

## 2017-10-31 RX ADMIN — CARBIDOPA AND LEVODOPA 1 TABLET: 25; 100 TABLET ORAL at 22:22

## 2017-10-31 RX ADMIN — PRAMIPEXOLE DIHYDROCHLORIDE 0.5 MG: 0.5 TABLET ORAL at 22:33

## 2017-10-31 RX ADMIN — Medication 10 ML: at 07:51

## 2017-10-31 RX ADMIN — ENOXAPARIN SODIUM 30 MG: 100 INJECTION SUBCUTANEOUS at 07:47

## 2017-10-31 RX ADMIN — POTASSIUM CHLORIDE: 2 INJECTION, SOLUTION, CONCENTRATE INTRAVENOUS at 09:36

## 2017-10-31 RX ADMIN — PAROXETINE HYDROCHLORIDE 40 MG: 20 TABLET, FILM COATED ORAL at 07:48

## 2017-10-31 RX ADMIN — ASPIRIN 81 MG: 81 TABLET, COATED ORAL at 07:49

## 2017-10-31 RX ADMIN — CARBIDOPA AND LEVODOPA 1 TABLET: 25; 100 TABLET ORAL at 15:34

## 2017-10-31 RX ADMIN — AZITHROMYCIN MONOHYDRATE 500 MG: 500 INJECTION, POWDER, LYOPHILIZED, FOR SOLUTION INTRAVENOUS at 11:12

## 2017-10-31 RX ADMIN — FLECAINIDE ACETATE 50 MG: 100 TABLET ORAL at 22:20

## 2017-10-31 RX ADMIN — METOPROLOL TARTRATE 50 MG: 50 TABLET ORAL at 07:48

## 2017-10-31 RX ADMIN — FLECAINIDE ACETATE 50 MG: 100 TABLET ORAL at 07:48

## 2017-10-31 RX ADMIN — PRAMIPEXOLE DIHYDROCHLORIDE 0.5 MG: 0.5 TABLET ORAL at 18:31

## 2017-10-31 ASSESSMENT — ENCOUNTER SYMPTOMS
EYES NEGATIVE: 1
GASTROINTESTINAL NEGATIVE: 1

## 2017-10-31 ASSESSMENT — PAIN SCALES - GENERAL
PAINLEVEL_OUTOF10: 0
PAINLEVEL_OUTOF10: 0

## 2017-10-31 NOTE — FLOWSHEET NOTE
Pt resting quietly in bed. Eyes closed at present. Respirations are even and nonlabored. VSS. Tele SR/SB. Pt family at bedside. Call light in reach. Bed alarm engaged.

## 2017-10-31 NOTE — CARE COORDINATION
LYNN AND 7000 COMPLETED. Waleska Borjas Electronically signed by ADELIA Jackson on 10/31/2017 at 9:00 AM

## 2017-10-31 NOTE — PROGRESS NOTES
Chief Complaint   Patient presents with    Fever     pt sent in by visiting physicians for fever, AMS, and possible urine retention       SUBJECTIVE:    10/28/2017 Patient is a 78 y.o. female who presents with a chief complaint of MS changes/fatigue. Patient is followed on a regular basis by Dr. Marge Knox. Patient was diagnosed with urinary tract infection and apparently developed SVT and a heart rate of 180s on the second floor and transferred to the first floor after converting with adenosine. Patient and her  only Korean speaking. Most of the information is obtained from the chart and the staff. There is no history of  MI congestive heart failure or any arrhythmias. No history of stress test or left heart catheterization. Initial EKG shows normal sinus rhythm with poor R-wave progression. She did have a creatinine of 6.87 on presentation currently down to 1.03 and her potassium was initially 5.6 now down to 3.8. Magnesium is normal at 2.1 she does have mild anemia as well. 10/29/2017: Patient apparently had 2 episodes of SVT with rapid response and a heart rate of 160 to 180s given adenosine where she converted to sinus rhythm. She is Korean speaking only and denies any chest pain chest pressure heaviness. She does complain of palpitations when the episodes occur. Her potassium is normal at 3.5 today. She is hemodynamically stable. Her renal function is normal now. She is being treated for pneumonia/urosepsis. Thought to have urinary retention by nephrology    10/31/17  Patient awake Chinese speaking. Remains on tele. Rhythm controlled. /44 heart rate 64.  Okay to transfer to tele floor      Patient Active Problem List   Diagnosis    Hyperglycemia    Urinary retention    Altered mental status       Current Facility-Administered Medications   Medication Dose Route Frequency Provider Last Rate Last Dose    esmolol (BREVIBLOC) 2.5gm/250ml NS infusion  50 mcg/kg/min Intravenous Continuous Serjio Thomas MD 9.5 mL/hr at 10/30/17 1326 25 mcg/kg/min at 10/30/17 1326    flecainide (TAMBOCOR) tablet 50 mg  50 mg Oral 2 times per day Serjio Thomas MD   50 mg at 10/31/17 0748    pill splitter   Does not apply Once Serjio Thomas MD        metoprolol tartrate (LOPRESSOR) tablet 50 mg  50 mg Oral BID Serjio Thomas MD   50 mg at 10/31/17 0748    potassium chloride 40 mEq in sodium chloride 0.45 % 1,000 mL infusion   Intravenous Continuous Elvi Key MD 75 mL/hr at 10/31/17 0936      azithromycin (ZITHROMAX) 500 mg in D5W 250ml addavial  500 mg Intravenous Q24H Lacie Vasquez MD   Stopped at 10/30/17 1153    cefTRIAXone (ROCEPHIN) 1 g in sterile water 10 mL IV syringe  1 g Intravenous Q24H Lacie Vasquez MD   1 g at 10/30/17 1152    sodium chloride flush 0.9 % injection 10 mL  10 mL Intravenous 2 times per day NATHANIEL Flor   10 mL at 10/31/17 0751    sodium chloride flush 0.9 % injection 10 mL  10 mL Intravenous PRN NATHANIEL Hollingsworth        acetaminophen (TYLENOL) tablet 650 mg  650 mg Oral Q4H PRN NATHANIEL Hollingsworth   325 mg at 10/29/17 2230    magnesium hydroxide (MILK OF MAGNESIA) 400 MG/5ML suspension 30 mL  30 mL Oral Daily PRN NATHANIEL Hollingsworth   30 mL at 10/29/17 1337    ondansetron (ZOFRAN) injection 4 mg  4 mg Intravenous Q6H PRN NATHANIEL Hollingsworth        enoxaparin (LOVENOX) injection 30 mg  30 mg Subcutaneous Daily NATHANIEL Hollingsworth   30 mg at 10/31/17 0747    aspirin EC tablet 81 mg  81 mg Oral Daily Lacie Vasquez MD   81 mg at 10/31/17 0749    PARoxetine (PAXIL) tablet 40 mg  40 mg Oral QAM Lacie Vasquez MD   40 mg at 10/31/17 0748       ALLERGIES: Latex; Penicillins; and Vancomycin    Review of Systems   Constitutional: Negative for diaphoresis. HENT: Negative. Eyes: Negative. Cardiovascular: Negative for chest pain, palpitations and leg swelling. Gastrointestinal: Negative. Musculoskeletal: Negative. Skin: Negative. Psychiatric/Behavioral: Suicidal ideas:          OBJECTIVE:     VITALS:  Blood pressure (!) 172/65, pulse 69, temperature 99 °F (37.2 °C), resp. rate 18, height 5' 1.81\" (1.57 m), weight 139 lb 11.2 oz (63.4 kg), SpO2 100 %. Body mass index is 25.55 kg/m². Physical Exam   Constitutional: She is oriented to person, place, and time. She appears well-developed and well-nourished. HENT:   Head: Normocephalic and atraumatic. Eyes: Pupils are equal, round, and reactive to light. Neck: Normal range of motion. Neck supple. No JVD present. No tracheal deviation present. No thyromegaly present. Cardiovascular: Normal rate, regular rhythm, normal heart sounds and intact distal pulses. PMI is not displaced. Exam reveals no gallop, no S3, no distant heart sounds and no friction rub. No murmur heard. Pulmonary/Chest: No respiratory distress. She has no wheezes. She has no rales. She exhibits no tenderness. Abdominal: Soft. Bowel sounds are normal. She exhibits no distension and no mass. There is no tenderness. There is no rebound and no guarding. Musculoskeletal: She exhibits no edema. Neurological: She is alert and oriented to person, place, and time. No cranial nerve deficit. Khmer speaking   Skin: Skin is warm and dry. No rash noted. She is not diaphoretic. No erythema. No pallor. Psychiatric: She has a normal mood and affect.  Her behavior is normal. Judgment and thought content normal.         LABS:  Recent Results (from the past 24 hour(s))   POCT Glucose    Collection Time: 10/30/17  5:14 PM   Result Value Ref Range    POC Glucose 88 60 - 115 mg/dl    Performed on ACCU-CHEK    EKG 12 Lead    Collection Time: 10/31/17  3:47 AM   Result Value Ref Range    Ventricular Rate 64 BPM    Atrial Rate 64 BPM    P-R Interval 120 ms    QRS Duration 62 ms    Q-T Interval 400 ms    QTc Calculation (Bazett) 412 ms    P Axis 38 degrees    R Axis -29 degrees

## 2017-10-31 NOTE — FLOWSHEET NOTE
Pt repositioned in bed for comfort. Resting quietly in bed without complaint. Respirations are even and nonlabored. VSS. Tele SR. Call light in reach. Bed alarm engaged.

## 2017-10-31 NOTE — PROGRESS NOTES
Neurology Follow up    SUBJECTIVE: very few question answered, but some english noted  Follows all commands    PHYSICAL EXAM:    /71   Pulse 83   Temp 98.6 °F (37 °C) (Oral)   Resp 21   Wt 139 lb 11.2 oz (63.4 kg)   SpO2 100%   BMI 25.55 kg/m²   General Appearance:      Mental Status Exam:             Level of Alertness:   awake            Orientation:   person,                Funduscopic Exam:     Cranial Nerves        Cranial nerve II           Visual acuity:  normal           Visual fields:  normal      Cranial nerve III           Pupils:  equal, round, reactive to light      Cranial nerves III, IV, VI           Extraocular Movements: intact      Cranial nerve V           Facial sensation:  intact      Cranial nerve VII           Facial strength: intact      Cranial nerve VIII           Hearing:  intact      Cranial nerve IX           Palate:  intact      Cranial nerve XI         Shoulder shrug:  intact      Cranial nerve XII          Tongue movement:  normal    Motor:    Drift:  absent  Motor exam is symmetrical 4 out of 5 all extremities bilaterally  Tone:  normal  Abnormal Movements:  absent            Sensory:        Pinprick             Right Upper Extremity:  normal             Left Upper Extremity:  normal             Right Lower Extremity:  normal             Left Lower Extremity:  normal           Vibration                         Touch            Proprioception                 Coordination: nor relaible          Finger/Nose   Right:  normal              Left:  normal          Heel-Knee-Shin                Right:  normal              Left:  normal          Rapid Alternating Movements              Right:  normal              Left:  normal          Gait:                       Casual:  defered                       Romberg:              Reflexes:             Deep Tendon Reflexes:             Reflexes are 2 +, ankle reflexes absent             Plantar response:                Right:  downgoing scans at this facility use dose modulation, iterative reconstruction, and/or weight based dosing when appropriate to reduce radiation dose to as low as reasonably achievable. Ct Head Without Contrast    Result Date: 10/26/2017  HEAD CT WITHOUT CONTRAST: 10/26/2017 CLINICAL HISTORY: Altered mental status. COMPARISON: 9/21/2015. TECHNIQUE: Spiral unenhanced images were obtained of the head, with routine reconstructions performed. All CT scans at this facility use dose modulation, iterative reconstruction, and/or weight based dosing when appropriate to reduce radiation dose to as low as reasonably achievable. FINDINGS: There is no intracranial hemorrhage, mass effect, midline shift, extra-axial collection,  evidence of hydrocephalus, recent ischemic infarct, or skull fracture identified. Mild atrophic changes appear similarly prior study The mastoid air cells and visualized paranasal sinuses are clear. NO ACUTE INTRACRANIAL PROCESS, OR SIGNIFICANT CHANGE FROM 9/21/2015 IDENTIFIED. Xr Chest Portable    Result Date: 10/26/2017  EXAMINATION: XR CHEST PORTABLE 1 VIEW: CLINICAL HISTORY: PATIENT UNRESPONSIVE. COMPARISONS: 10/19/2017 FINDINGS: Radiograph was obtained in shallow inspiration. There is moderate elevation of the right hemidiaphragm. There is mild right lower lung linear atelectasis. Cardiac size is borderline. Pulmonary vascularity is normal. There are no definite infiltrates or effusions. There is no pneumothorax. There are atherosclerotic changes of the thoracic aorta. There is mild thoracolumbar levocurvature, probably positional. There is no significant change from prior exam.     SHALLOW INSPIRATORY PORTABLE CHEST RADIOGRAPH. MODERATE RIGHT HEMIDIAPHRAGMATIC ELEVATION AND RIGHT BASILAR ATELECTASIS.     Us Retroperitoneal Limited    Result Date: 10/26/2017  EXAMINATION: ULTRASOUND RETROPERITONEAL LIMITED (KIDNEYS) CLINICAL HISTORY:  RENAL FAILURE, ACUTE (KIDNEY INJURY)  N17.9 Acute renal failure superimposed on chronic kidney disease, on chronic dialysis, unspecified acute renal failure type ([facility] [address] ICD10 COMPARISONS:  NONE AVAILABLE TECHNIQUE: Transabdominal sector gray scale sonography. Sonographic imaging was performed by a registered sonographer and the images are submitted for interpretation. FINDINGS:  The right kidney measures 4.8 x 4.9 x 10.0cm. The left kidney measures 4.7 x 5.7 x 10.4cm. There is no hydronephrosis. No renal masses are identified. Renal parenchymal echotexture is within normal limits. NEGATIVE ULTRASOUND EXAMINATION OF THE KIDNEYS. Recent Labs      10/28/17   0611  10/29/17   0533   WBC  7.9  7.3   HGB  10.8*  12.0   PLT  160  176     Recent Labs      10/28/17   0611  10/29/17   0533  10/30/17   0938   NA  148*  147*  143   K  3.8  3.5  4.1   CL  111*  107  106   CO2  26  28  25   BUN  46*  23  14   CREATININE  1.03*  0.77  0.67   GLUCOSE  145*  102  161*     No results for input(s): BILITOT, ALKPHOS, AST, ALT in the last 72 hours.   Lab Results   Component Value Date    PROTIME 10.9 10/26/2017    INR 1.0 10/26/2017     No results found for: LITHIUM, DILFRTOT, VALPROATE    ASSESSMENT AND PLAN  Encephalopathy  Metabolic  Mostly Korean speaking but able to follow commands  Non focal neuro

## 2017-10-31 NOTE — PLAN OF CARE
Problem: Falls - Risk of  Goal: Absence of falls  Outcome: Ongoing      Problem: Risk for Impaired Skin Integrity  Goal: Tissue integrity - skin and mucous membranes  Structural intactness and normal physiological function of skin and  mucous membranes. Outcome: Ongoing      Problem: Nutrition  Goal: Optimal nutrition therapy  Nutrition Problem: Inadequate oral intake, in context of acute illness or injury   Intervention: Food and/or Nutrient Delivery: Start ONS  Nutritional Goals: Intake Of Meals /ONS'S Will Improve To > 75%.     Outcome: Ongoing      Problem: Pain:  Goal: Control of acute pain  Control of acute pain   Outcome: Ongoing

## 2017-10-31 NOTE — FLOWSHEET NOTE
Patient arrived to [address] [place] [address] from ICU via bed. Pts family at bedside.  NSR.

## 2017-10-31 NOTE — PROGRESS NOTES
Renal Adjustment Per Protocol: Lovenox 30mg daily changed to Lovenox 40mg daily based on    Recent Labs      10/31/17   0551   CREATININE  0.65    Estimated Creatinine Clearance: 65 mL/min (based on SCr of 0.65 mg/dL).      Tara Adames,  [facility]

## 2017-11-01 PROBLEM — I47.1 PSVT (PAROXYSMAL SUPRAVENTRICULAR TACHYCARDIA) (HCC): Status: ACTIVE | Noted: 2017-11-01

## 2017-11-01 LAB
ANION GAP SERPL CALCULATED.3IONS-SCNC: 13 MEQ/L (ref 7–13)
BASOPHILS ABSOLUTE: 0.1 K/UL (ref 0–0.2)
BASOPHILS RELATIVE PERCENT: 1 %
BLOOD CULTURE, ROUTINE: NORMAL
BUN BLDV-MCNC: 15 MG/DL (ref 8–23)
CALCIUM SERPL-MCNC: 7.9 MG/DL (ref 8.6–10.2)
CHLORIDE BLD-SCNC: 102 MEQ/L (ref 98–107)
CO2: 24 MEQ/L (ref 22–29)
CREAT SERPL-MCNC: 0.72 MG/DL (ref 0.5–0.9)
CULTURE, BLOOD 2: NORMAL
EOSINOPHILS ABSOLUTE: 0.1 K/UL (ref 0–0.7)
EOSINOPHILS RELATIVE PERCENT: 1.5 %
GFR AFRICAN AMERICAN: >60
GFR NON-AFRICAN AMERICAN: >60
GLUCOSE BLD-MCNC: 85 MG/DL (ref 74–109)
GLUCOSE BLD-MCNC: 89 MG/DL (ref 60–115)
GLUCOSE BLD-MCNC: 94 MG/DL (ref 60–115)
HCT VFR BLD CALC: 32.7 % (ref 37–47)
HEMOGLOBIN: 10.9 G/DL (ref 12–16)
LACTIC ACID: 0.7 MMOL/L (ref 0.5–2.2)
LYMPHOCYTES ABSOLUTE: 0.7 K/UL (ref 1–4.8)
LYMPHOCYTES RELATIVE PERCENT: 7.8 %
MCH RBC QN AUTO: 29.8 PG (ref 27–31.3)
MCHC RBC AUTO-ENTMCNC: 33.2 % (ref 33–37)
MCV RBC AUTO: 89.6 FL (ref 82–100)
MONOCYTES ABSOLUTE: 0.8 K/UL (ref 0.2–0.8)
MONOCYTES RELATIVE PERCENT: 7.8 %
NEUTROPHILS ABSOLUTE: 7.9 K/UL (ref 1.4–6.5)
NEUTROPHILS RELATIVE PERCENT: 81.9 %
PDW BLD-RTO: 13 % (ref 11.5–14.5)
PERFORMED ON: NORMAL
PERFORMED ON: NORMAL
PLATELET # BLD: 275 K/UL (ref 130–400)
POTASSIUM SERPL-SCNC: 4.3 MEQ/L (ref 3.5–5.1)
RBC # BLD: 3.65 M/UL (ref 4.2–5.4)
SODIUM BLD-SCNC: 139 MEQ/L (ref 132–144)
WBC # BLD: 9.6 K/UL (ref 4.8–10.8)

## 2017-11-01 PROCEDURE — 6370000000 HC RX 637 (ALT 250 FOR IP): Performed by: SPECIALIST

## 2017-11-01 PROCEDURE — 99231 SBSQ HOSP IP/OBS SF/LOW 25: CPT | Performed by: PHYSICIAN ASSISTANT

## 2017-11-01 PROCEDURE — G8979 MOBILITY GOAL STATUS: HCPCS

## 2017-11-01 PROCEDURE — 2580000003 HC RX 258: Performed by: INTERNAL MEDICINE

## 2017-11-01 PROCEDURE — 85025 COMPLETE CBC W/AUTO DIFF WBC: CPT

## 2017-11-01 PROCEDURE — 6370000000 HC RX 637 (ALT 250 FOR IP): Performed by: PHYSICIAN ASSISTANT

## 2017-11-01 PROCEDURE — 97163 PT EVAL HIGH COMPLEX 45 MIN: CPT

## 2017-11-01 PROCEDURE — 2580000003 HC RX 258: Performed by: PHYSICIAN ASSISTANT

## 2017-11-01 PROCEDURE — 6360000002 HC RX W HCPCS: Performed by: INTERNAL MEDICINE

## 2017-11-01 PROCEDURE — G8988 SELF CARE GOAL STATUS: HCPCS

## 2017-11-01 PROCEDURE — 6370000000 HC RX 637 (ALT 250 FOR IP): Performed by: INTERNAL MEDICINE

## 2017-11-01 PROCEDURE — 6370000000 HC RX 637 (ALT 250 FOR IP): Performed by: PSYCHIATRY & NEUROLOGY

## 2017-11-01 PROCEDURE — G8978 MOBILITY CURRENT STATUS: HCPCS

## 2017-11-01 PROCEDURE — 2060000000 HC ICU INTERMEDIATE R&B

## 2017-11-01 PROCEDURE — 84443 ASSAY THYROID STIM HORMONE: CPT

## 2017-11-01 PROCEDURE — 6360000002 HC RX W HCPCS: Performed by: PHYSICIAN ASSISTANT

## 2017-11-01 PROCEDURE — 97167 OT EVAL HIGH COMPLEX 60 MIN: CPT

## 2017-11-01 PROCEDURE — 99232 SBSQ HOSP IP/OBS MODERATE 35: CPT | Performed by: INTERNAL MEDICINE

## 2017-11-01 PROCEDURE — 36415 COLL VENOUS BLD VENIPUNCTURE: CPT

## 2017-11-01 PROCEDURE — 83605 ASSAY OF LACTIC ACID: CPT

## 2017-11-01 PROCEDURE — 80048 BASIC METABOLIC PNL TOTAL CA: CPT

## 2017-11-01 PROCEDURE — G8987 SELF CARE CURRENT STATUS: HCPCS

## 2017-11-01 RX ORDER — METOPROLOL TARTRATE 50 MG/1
50 TABLET, FILM COATED ORAL 2 TIMES DAILY
Status: DISCONTINUED | OUTPATIENT
Start: 2017-11-01 | End: 2017-11-03 | Stop reason: HOSPADM

## 2017-11-01 RX ADMIN — FLECAINIDE ACETATE 50 MG: 100 TABLET ORAL at 21:05

## 2017-11-01 RX ADMIN — CARBIDOPA AND LEVODOPA 1 TABLET: 25; 100 TABLET ORAL at 14:06

## 2017-11-01 RX ADMIN — ROTIGOTINE 1 PATCH: 2 PATCH, EXTENDED RELEASE TRANSDERMAL at 12:13

## 2017-11-01 RX ADMIN — AZITHROMYCIN MONOHYDRATE 500 MG: 500 INJECTION, POWDER, LYOPHILIZED, FOR SOLUTION INTRAVENOUS at 12:13

## 2017-11-01 RX ADMIN — Medication 10 ML: at 21:06

## 2017-11-01 RX ADMIN — CEFTRIAXONE SODIUM 1 G: 10 INJECTION, POWDER, FOR SOLUTION INTRAVENOUS at 12:12

## 2017-11-01 RX ADMIN — PAROXETINE HYDROCHLORIDE 40 MG: 20 TABLET, FILM COATED ORAL at 08:52

## 2017-11-01 RX ADMIN — Medication 10 ML: at 08:54

## 2017-11-01 RX ADMIN — METOPROLOL TARTRATE 50 MG: 50 TABLET ORAL at 08:52

## 2017-11-01 RX ADMIN — POTASSIUM CHLORIDE: 2 INJECTION, SOLUTION, CONCENTRATE INTRAVENOUS at 15:49

## 2017-11-01 RX ADMIN — ENOXAPARIN SODIUM 40 MG: 40 INJECTION, SOLUTION INTRAVENOUS; SUBCUTANEOUS at 08:51

## 2017-11-01 RX ADMIN — PRAMIPEXOLE DIHYDROCHLORIDE 0.5 MG: 0.5 TABLET ORAL at 14:06

## 2017-11-01 RX ADMIN — ASPIRIN 81 MG: 81 TABLET, COATED ORAL at 08:52

## 2017-11-01 RX ADMIN — METOPROLOL TARTRATE 50 MG: 50 TABLET ORAL at 21:05

## 2017-11-01 RX ADMIN — PANTOPRAZOLE SODIUM 20 MG: 20 TABLET, DELAYED RELEASE ORAL at 08:52

## 2017-11-01 RX ADMIN — ACETAMINOPHEN 650 MG: 325 TABLET ORAL at 08:52

## 2017-11-01 RX ADMIN — METOPROLOL TARTRATE 50 MG: 50 TABLET ORAL at 00:05

## 2017-11-01 RX ADMIN — PRAMIPEXOLE DIHYDROCHLORIDE 0.5 MG: 0.5 TABLET ORAL at 08:52

## 2017-11-01 RX ADMIN — CARBIDOPA AND LEVODOPA 1 TABLET: 25; 100 TABLET ORAL at 21:06

## 2017-11-01 RX ADMIN — POTASSIUM CHLORIDE: 2 INJECTION, SOLUTION, CONCENTRATE INTRAVENOUS at 01:47

## 2017-11-01 RX ADMIN — PRAMIPEXOLE DIHYDROCHLORIDE 0.5 MG: 0.5 TABLET ORAL at 21:05

## 2017-11-01 RX ADMIN — CARBIDOPA AND LEVODOPA 1 TABLET: 25; 100 TABLET ORAL at 08:52

## 2017-11-01 RX ADMIN — ACETAMINOPHEN 650 MG: 325 TABLET ORAL at 21:06

## 2017-11-01 RX ADMIN — ACETAMINOPHEN 650 MG: 325 TABLET ORAL at 18:03

## 2017-11-01 RX ADMIN — FLECAINIDE ACETATE 50 MG: 100 TABLET ORAL at 08:51

## 2017-11-01 ASSESSMENT — PAIN SCALES - WONG BAKER
WONGBAKER_NUMERICALRESPONSE: 0
WONGBAKER_NUMERICALRESPONSE: 8
WONGBAKER_NUMERICALRESPONSE: 0

## 2017-11-01 ASSESSMENT — PAIN SCALES - GENERAL
PAINLEVEL_OUTOF10: 5
PAINLEVEL_OUTOF10: 2
PAINLEVEL_OUTOF10: 7
PAINLEVEL_OUTOF10: 0
PAINLEVEL_OUTOF10: 2
PAINLEVEL_OUTOF10: 0
PAINLEVEL_OUTOF10: 6
PAINLEVEL_OUTOF10: 2
PAINLEVEL_OUTOF10: 0

## 2017-11-01 ASSESSMENT — PAIN DESCRIPTION - LOCATION: LOCATION: NECK

## 2017-11-01 NOTE — CARE COORDINATION
LSW spoke to [facility], [address] [facility]. [facility] states Javad Field was waiting for PT/OT to start precert. PT/OT worked with pt this morning. Precert started at 07:70 on 11/1/17. [id number] was completed.  Electronically signed by ADELIA Goetz on 11/1/17 at 12:16 PM

## 2017-11-01 NOTE — PROGRESS NOTES
superimposed on chronic kidney disease, on chronic dialysis, unspecified acute renal failure type ([facility] [address] ICD10 COMPARISONS:  NONE AVAILABLE TECHNIQUE: Transabdominal sector gray scale sonography. Sonographic imaging was performed by a registered sonographer and the images are submitted for interpretation. FINDINGS:  The right kidney measures 4.8 x 4.9 x 10.0cm. The left kidney measures 4.7 x 5.7 x 10.4cm. There is no hydronephrosis. No renal masses are identified. Renal parenchymal echotexture is within normal limits. NEGATIVE ULTRASOUND EXAMINATION OF THE KIDNEYS. Recent Labs      10/28/17   0611  10/29/17   0533   WBC  7.9  7.3   HGB  10.8*  12.0   PLT  160  176     Recent Labs      10/28/17   0611  10/29/17   0533  10/30/17   0938   NA  148*  147*  143   K  3.8  3.5  4.1   CL  111*  107  106   CO2  26  28  25   BUN  46*  23  14   CREATININE  1.03*  0.77  0.67   GLUCOSE  145*  102  161*     No results for input(s): BILITOT, ALKPHOS, AST, ALT in the last 72 hours.   Lab Results   Component Value Date    PROTIME 10.9 10/26/2017    INR 1.0 10/26/2017     No results found for: LITHIUM, DILFRTOT, VALPROATE    ASSESSMENT AND PLAN  Encephalopathy  Metabolic  Mostly Greek speaking but able to follow commands  Non focal neuro

## 2017-11-01 NOTE — PROGRESS NOTES
appearance: alert, appears stated age and cooperative  CONSTITUTIONAL:  no apparent distress  ENT:  normocepalic, without obvious abnormality, atraumatic  NECK:  supple, symmetrical, trachea midline  Lungs: clear to auscultation bilaterally  Heart: regular rate and rhythm, S1, S2 normal   Abdomen: soft lax, not tender, not distended, positive bowel sounds  Extremities: extremities normal, atraumatic, no cyanosis, edema  Neurologic: no fnd    Assessment and Plan:   Patient Active Hospital Problem List:   Altered mental status ()     PSVT (paroxysmal supraventricular tachycardia) ([address] (11/1/2017)      Hyperglycemia (10/27/2017)     Urinary retention (10/27/2017)      - in nsr, off esmolol gtt, cont metoprolol and flecainide, followed by EP cardio  - cont iv atbx,   - resolved tita, monitor  - neurology following for metabolic encephalopathy and adding neupro for stiffness  - pt/ot      Advance Directive: Full Code  VTE Prophylaxis: low molecular weight heparin -  continue  Discharge planning: pt/ot    Active Problems:    Altered mental status    PSVT (paroxysmal supraventricular tachycardia) ([facility] [address]    Hyperglycemia    Urinary retention      Joie Rico MD    Additional work up or/and treatment plan may be added today or then after based on clinical progression. I am managing a portion of pt care. Some medical issues are handled by other specialists. Additional work up and treatment should be done in out pt setting by pt PCP and other out pt providers.      SIGNATURE: Joie Rico MD PATIENT NAME: Joao Basurto   DATE: November 1, 2017 MRN: [id number]   TIME: 0900

## 2017-11-01 NOTE — PROGRESS NOTES
[facility] OCCUPATIONAL THERAPY EVALUATION - ACUTE     Date: 2017  Patient Name: Kym Crabtree        MRN: [id number]  Account: [de-identified]   : 1940  (78 y.o.)  Room: [place] [address]    Chart Review:  Diagnosis:  The primary encounter diagnosis was Altered mental status, unspecified altered mental status type. Diagnoses of Acute renal failure, unspecified acute renal failure type [facility]), Urinary retention, and Sepsis, due to unspecified organism [facility]) were also pertinent to this visit. Past Medical History:   Diagnosis Date    Depression     Fatigue     Fibromyalgia     Hyperlipidemia     Hypertension     Hypothyroid     Levodopa-induced dyskinesia     Lumbago     Muscular wasting and disuse atrophy     Myalgia     Osteoarthritis     Paralysis agitans ([facility] [address]     Parkinson's disease ([facility] [address]     Spinal stenosis     Thyroid disease     Urinary frequency      History reviewed. No pertinent surgical history. Precautions:  Fall,IV,O2,Catheter       Evaluation and Pt. rights have been reviewed: [x]Yes   [] No   If no why not:   Falls safety interventions in place  [x]Yes   [] No    Comments:     Subjective: Pt seen alone    Prior living arrangement: Per patient who is a questionable historian   Support contact: Spouse and Son    Pt lives: [] Alone   [x] With spouse   [] Other   Comment:    Home: [x] Single level   []  Two level   []  Split level     [x]  Apartment:     Entrance:  Stairs:   Hand rails  ,    Inside: Stairs:   Hand rails:    Bathroom: [] Bath tub   [x] Tub/Shower combo   []  Shower stall    Location:      DME: [x] W/W   [] [employer]   [] Rollator   []  W/C   [] Asad Macias   [] Shower Chair   [] [facility]  [] Dressing  AE  [] Other:    Previous Functional Status:  Unknown previous level of function. Pt unable to report previous ADL level. Pt had home health assist    Pain: Neck.   Pt unable to quantify  Start of session: -/10  Description:    Location: neck  End of session: -/10  Action: [] No Action Necessary    [] Patient reports pain at acceptable level for treatment  [x] Nursing notified    [] Other      Objective:  Observation:  Pt supine in bed    Orientation: Oriented to  [x] Person   [] Place  []Time    Vision:   [x]  WFL   [] Impaired  Comments:    Grossly  Hearing:  [x] WFL   [] Impaired  Comments:  Grossly  Sensation:   [x] WFL   []  Impaired   Comments:      Cognition:   [] WFL   [x] Impaired  Comments:    Communication:   [] WFL   [x] Impaired  Comments:Angolan and english    Range of Motion:  R UE AROM/PROM: [x]  WFL [] Impaired  Comments:   L UE AROM/PROM:  []  WFL [x] Impaired  Comments: Shoulder  Strength:   R UE Strength: []1    [] 2   [] 2+   [] 3   [] 3+   [] 4   [] 4+  [] 5  Comments: N/T formally  L UE Strength:  []1    [] 2   [] 2+   [] 3   [] 3+  [] 4   [] 4+   [] 5  Comments: N/T formally    Quality of Movement:  [] Good   [] Fair   [x] Poor     Coordination:  Gross motor: [] WFL   [x] Impaired   Fine motor: [] WFL   [x] Impaired     Functional Mobility:  Toilet Transfers:  N/T  Bed Transfer:  Dependent  Sit to stand: N/T  Bed to Chair:  N/T    Seated Balance:    Left posterior lean noted  Static: [] Good  [] Fair   [x] Poor   Dynamic: []  Good  [] Fair   [x] Poor     Standing Balance:   N/T  Static: [] Good   [] Fair  [] Poor   Dynamic: [] Good   [] Fair   [] Poor     Functional Endurance: [] Good  [] Fair  [x] Poor     ADLs  Feeding:   Dependent  UE Dressing:  Dependent  LB Dressing:  Dependent  Bathing:  N/T  Toileting: N/T  Grooming: Dependent    Treatment Plan will consist of:   [x] ADL Training   [x] Strengthening   [x] Endurance   [x] Transfer Training   []  DME ed      [] HEP  [] Manual Therapy   [x] AROM/PROM    [] Coordination   [x] Cognitive Training   []Safety training   [] Other :    Goals:   Patient will:   [x]  Improve functional endurance to tolerate/complete 8-12 mins of ADL's   [x]  Be Mod A in UB ADLs    [x]  Be Max A in LB ADLs   [x]  Be Mod A in ADL transfers

## 2017-11-01 NOTE — PROGRESS NOTES
Urology Progress Note    Assessment and Plan:   Assessment-  1. Urinary retention  2. Altered mental status  3. Acute renal failure        Plan-  1. Patient may be discharged to rehab or skilled nursing facility with Trevizo catheter in place  2. May attempt void trial in 2 weeks when patient is stronger and able to ambulate and mobilize on her own  3. Follow-up with Dr. Denis Corrales in 2 weeks    CC: Mental status change      Patient Active Problem List:     Hyperglycemia     Urinary retention     Altered mental status     PSVT (paroxysmal supraventricular tachycardia) (HCC)      Subjective:   Admit Date: 10/26/2017      Interval History: Patient is a 46-year-old  woman admitted for altered mental status fatigue and weakness. Diagnosed with acute renal failure Trevizo catheter was placed and thousand 500 mL of urine were drained. She was admitted to the ICU due to ARDS, has stabilized and returned to the floor.   She is currently awake but slightly confused and has difficulty answering questions but does deny pain at this time    Review of systems: denies dysuria, hematuria, ABD pain, flank pain, N/V/D  Medications:   Scheduled Meds:   metoprolol tartrate  50 mg Oral BID    rotigotine  1 patch Transdermal Daily    pantoprazole  20 mg Oral QAM AC    carbidopa-levodopa  1 tablet Oral TID    pramipexole  0.5 mg Oral TID    enoxaparin  40 mg Subcutaneous Daily    flecainide  50 mg Oral 2 times per day    pill splitter   Does not apply Once    azithromycin  500 mg Intravenous Q24H    cefTRIAXone (ROCEPHIN) IV  1 g Intravenous Q24H    sodium chloride flush  10 mL Intravenous 2 times per day    aspirin  81 mg Oral Daily    PARoxetine  40 mg Oral QAM     Continuous Infusions:   IV infusion builder 75 mL/hr at 11/01/17 1549       CBC:   Recent Labs      11/01/17   0611   WBC  9.6   HGB  10.9*   PLT  275     CMP:  Recent Labs      10/30/17   0938  10/31/17   0551  11/01/17   0611   NA  143  145*  139   K  4.1

## 2017-11-01 NOTE — PROGRESS NOTES
Physical Therapy Med Surg Initial Assessment  Facility/Department: [facility]  Room: C4/I931-62       NAME: Gerard Scott  : 1940 (78 y.o.)  MRN: [id number]  CODE STATUS: Full Code    Date of Service: 2017    Patient Diagnosis(es): Acute renal failure superimposed on chronic kidney disease, on chronic dialysis, unspecified acute renal failure type ([facility] [address] [N17.9, N18.9, Z99.2]  Arrhythmia [I49.9]   Chief Complaint   Patient presents with    Fever     pt sent in by visiting physicians for fever, AMS, and possible urine retention     Patient Active Problem List    Diagnosis Date Noted    PSVT (paroxysmal supraventricular tachycardia) ([facility] [address] 2017     Priority: High    Altered mental status      Priority: High    Hyperglycemia 10/27/2017    Urinary retention 10/27/2017        Past Medical History:   Diagnosis Date    Depression     Fatigue     Fibromyalgia     Hyperlipidemia     Hypertension     Hypothyroid     Levodopa-induced dyskinesia     Lumbago     Muscular wasting and disuse atrophy     Myalgia     Osteoarthritis     Paralysis agitans ([facility] [address]     Parkinson's disease ([facility] [address]     Spinal stenosis     Thyroid disease     Urinary frequency      History reviewed. No pertinent surgical history. Restrictions: Body mass index is 25.55 kg/m². SUBJECTIVE: Subjective: Pt spoke very little [place] Unable to use blue phone per nursing. Pre Treatment Pain Screening  Pain at present: 8  Scale Used:  Faces  Intervention List: Nurse called to administer meds    Post Treatment Pain Screening:   Pain Screening  Patient Currently in Pain: Yes  Pain Assessment  Pain Assessment: Faces  Ritchie-Baker Pain Rating: Hurts whole lot  Pain Location: Neck    Prior Level of Function:  Social/Functional History  Lives With: Spouse  Type of Home: House  Home Equipment: Rolling walker  Receives Help From: [employer] ([facility] 10am-2:30pm Mon-Fri, 1.5 hrs on Sat and details re: home set up, PLOF, etc. Pt required Max A to sit at EOB with no righting reactions observed. Pt demonstrates significant postural rigidity which limited functional mobility assessment. Further therapy is recommended to improve strength, endurance, balance, and mobility to facilitate safe return to home. REQUIRES PT FOLLOW UP: Yes      PLAN OF CARE:  Plan  Times per week: 3-6  Current Treatment Recommendations: Strengthening, Balance Training, Functional Mobility Training, Transfer Training, Endurance Training, Neuromuscular Re-education, Pain Management, Safety Education & Training, Patient/Caregiver Education & Training, Equipment Evaluation, Education, & procurement  Safety Devices  Type of devices: All fall risk precautions in place    G-Code:  PT G-Codes  Functional Limitation: Mobility: Walking and moving around  Mobility: Walking and Moving Around Current Status (): At least 80 percent but less than 100 percent impaired, limited or restricted  Mobility: Walking and Moving Around Goal Status ():  At least 80 percent but less than 100 percent impaired, limited or restricted    Goals:  Short term goals  Short term goal 1: Pt will complete supine<>sit with Mod A with HOB flat and use of bedrails  Short term goal 2: Pt will maintain static sitting EOB for 1 min with CGA    Therapy Time:   Individual   Time In 0830   Time Out 0850   Minutes 20   Timed Code Treatment Minutes: 0 Minutes       Holly Abdi PT, 11/01/17 at 9:06 AM

## 2017-11-01 NOTE — PROGRESS NOTES
Nutrition Assessment    Type and Reason for Visit: Reassess    Nutrition Recommendations: Continue current diet, Continue current ONS    Malnutrition Assessment:  · Malnutrition Status: Insufficient data  · Context: Acute illness or injury  · Findings of the 6 clinical characteristics of malnutrition (Minimum of 2 out of 6 clinical characteristics is required to make the diagnosis of moderate or severe Protein Calorie Malnutrition based on AND/ASPEN Guidelines):  1. Energy Intake-Not available, not able to assess    2. Weight Loss-Unable to assess, unable to assess  3. Fat Loss-No significant subcutaneous fat loss,    4. Muscle Loss-Mild muscle mass loss, Clavicles (pectoralis and deltoids), Temples (temporalis muscle)  5. Fluid Accumulation-Mild fluid accumulation, Extremities  6.  Strength-Not measured    Nutrition Diagnosis:   · Problem: Inadequate oral intake, in context of acute illness or injury  · Etiology: related to Insufficient energy/nutrient consumption, Difficulty swallowing     Signs and symptoms:  as evidenced by Swallow study results, Intake 0-25% (AMS.)    Nutrition Assessment:  · Subjective Assessment: pt sleeping. Noted pt with some confusion.   Malay speaking  · Nutrition-Focused Physical Findings: I/O; (10/31) intake 2634 ml/ output 1400 ml  · Wound Type: None  · Current Nutrition Therapies:  · Oral Diet Orders: Dysphagia 1 (Pureed)   · Oral Diet intake: 1-25%  · Oral Nutrition Supplement (ONS) Orders: Standard High Calorie Oral Supplement, Frozen Oral Supplement  · ONS intake: 1-25%  · Anthropometric Measures:  · Ht: 5' 1.81\" (157 cm)   · Current Body Wt: 139 lb (63 kg) (No new wt)  · Usual Body Wt: 142 lb (64.4 kg) (9/21/15)  · % Weight Change:  (Not Available),     · Ideal Body Wt:  (~ 110#), % Ideal Body  (~ 126%)  · BMI Classification: BMI 25.0 - 29.9 Overweight (~ 25.4)  · Comparative Standards (Estimated Nutrition Needs):  · Estimated Daily Total Kcal: 1575 to 1735  · Estimated

## 2017-11-02 LAB
GLUCOSE BLD-MCNC: 82 MG/DL (ref 60–115)
PERFORMED ON: NORMAL

## 2017-11-02 PROCEDURE — 2580000003 HC RX 258: Performed by: INTERNAL MEDICINE

## 2017-11-02 PROCEDURE — 93010 ELECTROCARDIOGRAM REPORT: CPT | Performed by: INTERNAL MEDICINE

## 2017-11-02 PROCEDURE — 6360000002 HC RX W HCPCS: Performed by: INTERNAL MEDICINE

## 2017-11-02 PROCEDURE — 93005 ELECTROCARDIOGRAM TRACING: CPT

## 2017-11-02 PROCEDURE — 6370000000 HC RX 637 (ALT 250 FOR IP): Performed by: INTERNAL MEDICINE

## 2017-11-02 PROCEDURE — 6360000002 HC RX W HCPCS: Performed by: PHYSICIAN ASSISTANT

## 2017-11-02 PROCEDURE — 99222 1ST HOSP IP/OBS MODERATE 55: CPT | Performed by: INTERNAL MEDICINE

## 2017-11-02 PROCEDURE — 97535 SELF CARE MNGMENT TRAINING: CPT

## 2017-11-02 PROCEDURE — 6370000000 HC RX 637 (ALT 250 FOR IP): Performed by: SPECIALIST

## 2017-11-02 PROCEDURE — 99232 SBSQ HOSP IP/OBS MODERATE 35: CPT | Performed by: INTERNAL MEDICINE

## 2017-11-02 PROCEDURE — 2060000000 HC ICU INTERMEDIATE R&B

## 2017-11-02 PROCEDURE — 2700000000 HC OXYGEN THERAPY PER DAY

## 2017-11-02 PROCEDURE — 6370000000 HC RX 637 (ALT 250 FOR IP): Performed by: PSYCHIATRY & NEUROLOGY

## 2017-11-02 RX ORDER — LEVOTHYROXINE SODIUM 0.05 MG/1
50 TABLET ORAL DAILY
Status: DISCONTINUED | OUTPATIENT
Start: 2017-11-02 | End: 2017-11-03

## 2017-11-02 RX ADMIN — POTASSIUM CHLORIDE: 2 INJECTION, SOLUTION, CONCENTRATE INTRAVENOUS at 06:50

## 2017-11-02 RX ADMIN — FLECAINIDE ACETATE 50 MG: 100 TABLET ORAL at 20:37

## 2017-11-02 RX ADMIN — CEFTRIAXONE SODIUM 1 G: 10 INJECTION, POWDER, FOR SOLUTION INTRAVENOUS at 12:14

## 2017-11-02 RX ADMIN — CARBIDOPA AND LEVODOPA 1 TABLET: 25; 100 TABLET ORAL at 20:37

## 2017-11-02 RX ADMIN — ASPIRIN 81 MG: 81 TABLET, COATED ORAL at 09:46

## 2017-11-02 RX ADMIN — ONDANSETRON 4 MG: 2 INJECTION, SOLUTION INTRAMUSCULAR; INTRAVENOUS at 01:07

## 2017-11-02 RX ADMIN — PANTOPRAZOLE SODIUM 20 MG: 20 TABLET, DELAYED RELEASE ORAL at 09:48

## 2017-11-02 RX ADMIN — PRAMIPEXOLE DIHYDROCHLORIDE 0.5 MG: 0.5 TABLET ORAL at 09:46

## 2017-11-02 RX ADMIN — CARBIDOPA AND LEVODOPA 1 TABLET: 25; 100 TABLET ORAL at 09:44

## 2017-11-02 RX ADMIN — METOPROLOL TARTRATE 50 MG: 50 TABLET ORAL at 09:44

## 2017-11-02 RX ADMIN — PAROXETINE HYDROCHLORIDE 40 MG: 20 TABLET, FILM COATED ORAL at 09:45

## 2017-11-02 RX ADMIN — ROTIGOTINE 1 PATCH: 2 PATCH, EXTENDED RELEASE TRANSDERMAL at 09:44

## 2017-11-02 RX ADMIN — AZITHROMYCIN MONOHYDRATE 500 MG: 500 INJECTION, POWDER, LYOPHILIZED, FOR SOLUTION INTRAVENOUS at 12:16

## 2017-11-02 RX ADMIN — PRAMIPEXOLE DIHYDROCHLORIDE 0.5 MG: 0.5 TABLET ORAL at 15:26

## 2017-11-02 RX ADMIN — ENOXAPARIN SODIUM 40 MG: 40 INJECTION, SOLUTION INTRAVENOUS; SUBCUTANEOUS at 09:44

## 2017-11-02 RX ADMIN — PRAMIPEXOLE DIHYDROCHLORIDE 0.5 MG: 0.5 TABLET ORAL at 20:36

## 2017-11-02 RX ADMIN — FLECAINIDE ACETATE 50 MG: 100 TABLET ORAL at 09:46

## 2017-11-02 RX ADMIN — CARBIDOPA AND LEVODOPA 1 TABLET: 25; 100 TABLET ORAL at 14:40

## 2017-11-02 RX ADMIN — METOPROLOL TARTRATE 50 MG: 50 TABLET ORAL at 20:38

## 2017-11-02 ASSESSMENT — ENCOUNTER SYMPTOMS
WHEEZING: 0
GASTROINTESTINAL NEGATIVE: 1
NAUSEA: 0
BLOOD IN STOOL: 0
SHORTNESS OF BREATH: 0
RESPIRATORY NEGATIVE: 1
CHEST TIGHTNESS: 0
EYES NEGATIVE: 1
STRIDOR: 0
COUGH: 0

## 2017-11-02 ASSESSMENT — PAIN SCALES - GENERAL: PAINLEVEL_OUTOF10: 0

## 2017-11-02 NOTE — PROGRESS NOTES
Hospitalist Progress Note  11/2/2017 1500  Subjective:   Admit Date: 10/26/2017  PCP: Angelica Serrano    Chief complaint: encephalopathy, KATHYA on CKD, arrhythmia, urinary retention   Interval History: no new co    DIET DYSPHAGIA I PUREED; Dietary Nutrition Supplements: Standard High Calorie Oral Supplement  Dietary Nutrition Supplements: Frozen Oral Supplement    Intake/Output Summary (Last 24 hours) at 11/02/17 1756  Last data filed at 11/02/17 0535   Gross per 24 hour   Intake             1300 ml   Output              750 ml   Net              550 ml     Medications:      IV infusion builder 75 mL/hr at 11/02/17 0650      metoprolol tartrate  50 mg Oral BID    rotigotine  1 patch Transdermal Daily    pantoprazole  20 mg Oral QAM AC    carbidopa-levodopa  1 tablet Oral TID    pramipexole  0.5 mg Oral TID    enoxaparin  40 mg Subcutaneous Daily    flecainide  50 mg Oral 2 times per day    pill splitter   Does not apply Once    cefTRIAXone (ROCEPHIN) IV  1 g Intravenous Q24H    sodium chloride flush  10 mL Intravenous 2 times per day    aspirin  81 mg Oral Daily    PARoxetine  40 mg Oral QAM     Recent Labs      11/01/17   0611   WBC  9.6   HGB  10.9*   PLT  275     Recent Labs      10/31/17   0551  11/01/17   0611   NA  145*  139   K  4.4  4.3   CL  106  102   CO2  26  24   BUN  13  15   CREATININE  0.65  0.72   GLUCOSE  77  85     No results for input(s): AST, ALT, ALB, BILITOT, ALKPHOS in the last 72 hours. Troponin T: No results for input(s): TROPONINI in the last 72 hours. Pro-BNP: No results for input(s): BNP in the last 72 hours. INR: No results for input(s): INR in the last 72 hours.     Objective:     Vitals:    11/02/17 0601 11/02/17 0722 11/02/17 0800 11/02/17 1416   BP:  (!) 122/51  (!) 109/42   Pulse:  72 77 65   Resp:  18  18   Temp:  98.8 °F (37.1 °C)  97.9 °F (36.6 °C)   TempSrc:  Oral  Oral   SpO2:  100%  100%   Weight: 148 lb 9.4 oz (67.4 kg)      Height:           General appearance:

## 2017-11-02 NOTE — CARE COORDINATION
Per Adryan Rivera, have precert for SNF. LSW notified the pt's RN, Roshan Friedman. Electronically signed by ADELIA Neri on 11/2/17 at 12:08 PM    RN aware pt can discharge to [facility] when pt is discharged. Await 2 physicians to sign off per RN.  Electronically signed by ADELIA Neri on 11/2/17 at 2:22 PM

## 2017-11-02 NOTE — PROGRESS NOTES
Physical Therapy Med Surg Daily Treatment Note  [facility]/Department: [facility]  Room: K93/F278-19       NAME: [place] Primes  : 1940 (78 y.o.)  MRN: [id number]  CODE STATUS: Full Code    Date of Service: 2017    Patient Diagnosis(es): Acute renal failure superimposed on chronic kidney disease, on chronic dialysis, unspecified acute renal failure type ([facility] [address] [N17.9, N18.9, Z99.2]  Arrhythmia [I49.9]   Chief Complaint   Patient presents with    Fever     pt sent in by visiting physicians for fever, AMS, and possible urine retention     Patient Active Problem List    Diagnosis Date Noted    PSVT (paroxysmal supraventricular tachycardia) ([facility] [address] 2017     Priority: High    Altered mental status      Priority: High    Hyperglycemia 10/27/2017    Urinary retention 10/27/2017        Past Medical History:   Diagnosis Date    Depression     Fatigue     Fibromyalgia     Hyperlipidemia     Hypertension     Hypothyroid     Levodopa-induced dyskinesia     Lumbago     Muscular wasting and disuse atrophy     Myalgia     Osteoarthritis     Paralysis agitans ([facility] [address]     Parkinson's disease ([facility] [address]     Spinal stenosis     Thyroid disease     Urinary frequency      History reviewed. No pertinent surgical history.       Restrictions  Restrictions/Precautions: Fall Risk    Subjective   General  Chart Reviewed: Yes  Family / Caregiver Present: No  Subjective  Subjective: spoke very little, hard to keep awake  General Comment  Comments: denies pain /dolor  Pre Treatment Pain Screening  Pain at present: 0  Scale Used: Numeric Score    Pain Screening  Patient Currently in Pain: Denies     Pain Reassessment: 0  Pain Assessment  Pain Assessment: 0-10  Pain Level: 0       Orientation  Orientation  Overall Orientation Status: Impaired  Orientation Level: Oriented to person    Objective   Bed mobility  Supine to Sit: Maximum assistance  Sit to Supine: Maximum assistance  Comment: heavy left lean with seated balance +2 safety    Transfers  Comment: NT 2nd to safety     Assessment   Activity Tolerance  Activity Tolerance: Patient limited by endurance; Patient limited by fatigue     Discharge Recommendations:  Patient would benefit from continued therapy after discharge    Goals  Short term goals  Short term goal 1: Pt will complete supine<>sit with Mod A with HOB flat and use of bedrails  Short term goal 2: Pt will maintain static sitting EOB for 1 min with CGA    Plan    Plan  Times per week: 3-6  Current Treatment Recommendations: Strengthening, Balance Training, Functional Mobility Training, Transfer Training, Endurance Training, Neuromuscular Re-education, Pain Management, Safety Education & Training, Patient/Caregiver Education & Training, Equipment Evaluation, Education, & procurement  Safety Devices  Type of devices: All fall risk precautions in place     Therapy Time   Individual   Time In 1140   Time Out 1154   Minutes 14   Pt able to follow with gestures, and tactile cues to perform bed mobility. Pt able to move legs in supine position but not enough to get over edge of bed. Pt assisted with rolling but still mod assist.  Heavy left lean with supine to sit and seated activity. Mod assist to keep pt upright. Pt able to reach with right hand and grab hand of second person to make neutral trunk. Pt performed reaches 3 times. Manual trunk motions, fwd, bwd, and lateral with mod assist.  Pt able to kick her legs freely eob. Pt tried to lay back down during tx several times.  Returned pt to supine to rest.   14 bed mobility/seated activity           Mary Lou Alejo PTA, 11/02/17 at 11:55 AM

## 2017-11-02 NOTE — PROGRESS NOTES
Progress Note  Patient: Marco Ng  Unit/Bed: V928/C586-77  YOB: 1940  MRN: [id number]  Acct: [de-identified]   Admitting Diagnosis: Acute renal failure superimposed on chronic kidney disease, on chronic dialysis, unspecified acute renal failure type ([facility] [address] [N17.9, N18.9, Z99.2]  Arrhythmia [I49.9]  Admit Date:  10/26/2017  Hospital Day: 7    Chief Complaint: f/u arrhythmia       Subjective:  Mentation has improved compared to beginning of the week. She is able to communicate some. She states she hs \"no much pain\"  She is tolerating antiarrhytmic Rx. Had been is SR all night. Just now has 3 second burst of SVT. EKG:SR 79 [id number]  Review of Systems:   Review of Systems   Constitutional: Negative. Negative for diaphoresis and fatigue. HENT: Negative. Eyes: Negative. Respiratory: Negative. Negative for cough, chest tightness, shortness of breath, wheezing and stridor. Cardiovascular: Negative. Negative for chest pain, palpitations and leg swelling. Gastrointestinal: Negative. Negative for blood in stool and nausea. Genitourinary: Negative. Musculoskeletal: Negative. Skin: Negative. Neurological: Negative. Negative for dizziness, syncope, weakness and light-headedness. Hematological: Negative. Psychiatric/Behavioral: Negative. Physical Examination:    BP (!) 122/51   Pulse 72   Temp 98.8 °F (37.1 °C) (Oral)   Resp 18   Ht 5' 1.81\" (1.57 m)   Wt 148 lb 9.4 oz (67.4 kg)   SpO2 100%   BMI 27.34 kg/m²    Physical Exam   Constitutional: She appears healthy. No distress. HENT:   Normal cephalic and Atraumatic   Eyes: Pupils are equal, round, and reactive to light. Neck: Normal range of motion and thyroid normal. Neck supple. No JVD present. No neck adenopathy. No thyromegaly present. Cardiovascular: Normal rate, regular rhythm, intact distal pulses and normal pulses. Murmur heard.   Pulmonary/Chest: Effort normal and breath sounds normal. She has no wheezes. She has no rales. She exhibits no tenderness. Abdominal: Soft. Bowel sounds are normal.   Musculoskeletal: Normal range of motion. She exhibits edema (trace). She exhibits no tenderness. Neurological: She is alert and oriented to person, place, and time. Skin: Skin is warm. No cyanosis. Nails show no clubbing.        LABS:  CBC:   Lab Results   Component Value Date    WBC 9.6 11/01/2017    RBC 3.65 11/01/2017    HGB 10.9 11/01/2017    HCT 32.7 11/01/2017    MCV 89.6 11/01/2017    MCH 29.8 11/01/2017    MCHC 33.2 11/01/2017    RDW 13.0 11/01/2017     11/01/2017    MPV 9.4 09/22/2015     CBC with Differential:    Lab Results   Component Value Date    WBC 9.6 11/01/2017    RBC 3.65 11/01/2017    HGB 10.9 11/01/2017    HCT 32.7 11/01/2017     11/01/2017    MCV 89.6 11/01/2017    MCH 29.8 11/01/2017    MCHC 33.2 11/01/2017    RDW 13.0 11/01/2017    LYMPHOPCT 7.8 11/01/2017    MONOPCT 7.8 11/01/2017    BASOPCT 1.0 11/01/2017    MONOSABS 0.8 11/01/2017    LYMPHSABS 0.7 11/01/2017    EOSABS 0.1 11/01/2017    BASOSABS 0.1 11/01/2017     CMP:    Lab Results   Component Value Date     11/01/2017    K 4.3 11/01/2017     11/01/2017    CO2 24 11/01/2017    BUN 15 11/01/2017    CREATININE 0.72 11/01/2017    GFRAA >60.0 11/01/2017    LABGLOM >60.0 11/01/2017    GLUCOSE 85 11/01/2017    PROT 7.5 10/26/2017    LABALBU 3.8 10/26/2017    CALCIUM 7.9 11/01/2017    BILITOT 0.4 10/26/2017    ALKPHOS 157 10/26/2017    AST 22 10/26/2017    ALT 24 10/26/2017     BMP:    Lab Results   Component Value Date     11/01/2017    K 4.3 11/01/2017     11/01/2017    CO2 24 11/01/2017    BUN 15 11/01/2017    LABALBU 3.8 10/26/2017    CREATININE 0.72 11/01/2017    CALCIUM 7.9 11/01/2017    GFRAA >60.0 11/01/2017    LABGLOM >60.0 11/01/2017    GLUCOSE 85 11/01/2017     Magnesium:    Lab Results   Component Value Date    MG 2.1 10/28/2017     Troponin:    Lab Results   Component Value Date    TROPONINI <0.010 10/26/2017             Assessment:  plan  Active Hospital Problems    Diagnosis Date Noted    PSVT (paroxysmal supraventricular tachycardia) (HCC) [I47.1] 11/01/2017     Priority: High    Altered mental status [R41.82]      Priority: High    Hyperglycemia [R73.9] 10/27/2017     Priority: Low    Urinary retention [R33.9] 10/27/2017     Priority: Low     1. SVT better controlled. Can go to nursing home on meds. 2. MS changes improved some  3. Continue asa  4ok for nursing home anytime from CV.           Electronically signed by Ekaterina Beverly MD on 11/2/2017 at 8:08 AM

## 2017-11-03 VITALS
SYSTOLIC BLOOD PRESSURE: 134 MMHG | WEIGHT: 148.59 LBS | HEIGHT: 62 IN | HEART RATE: 65 BPM | BODY MASS INDEX: 27.34 KG/M2 | OXYGEN SATURATION: 99 % | TEMPERATURE: 97.3 F | DIASTOLIC BLOOD PRESSURE: 77 MMHG | RESPIRATION RATE: 18 BRPM

## 2017-11-03 LAB
EKG ATRIAL RATE: 111 BPM
EKG ATRIAL RATE: 119 BPM
EKG ATRIAL RATE: 170 BPM
EKG ATRIAL RATE: 178 BPM
EKG ATRIAL RATE: 357 BPM
EKG ATRIAL RATE: 63 BPM
EKG ATRIAL RATE: 64 BPM
EKG ATRIAL RATE: 71 BPM
EKG ATRIAL RATE: 73 BPM
EKG ATRIAL RATE: 79 BPM
EKG ATRIAL RATE: 81 BPM
EKG ATRIAL RATE: 84 BPM
EKG P AXIS: 15 DEGREES
EKG P AXIS: 157 DEGREES
EKG P AXIS: 27 DEGREES
EKG P AXIS: 28 DEGREES
EKG P AXIS: 34 DEGREES
EKG P AXIS: 38 DEGREES
EKG P AXIS: 44 DEGREES
EKG P AXIS: 51 DEGREES
EKG P AXIS: 69 DEGREES
EKG P AXIS: 90 DEGREES
EKG P-R INTERVAL: 106 MS
EKG P-R INTERVAL: 116 MS
EKG P-R INTERVAL: 116 MS
EKG P-R INTERVAL: 120 MS
EKG P-R INTERVAL: 120 MS
EKG P-R INTERVAL: 122 MS
EKG P-R INTERVAL: 126 MS
EKG P-R INTERVAL: 128 MS
EKG P-R INTERVAL: 144 MS
EKG Q-T INTERVAL: 240 MS
EKG Q-T INTERVAL: 244 MS
EKG Q-T INTERVAL: 250 MS
EKG Q-T INTERVAL: 280 MS
EKG Q-T INTERVAL: 342 MS
EKG Q-T INTERVAL: 352 MS
EKG Q-T INTERVAL: 358 MS
EKG Q-T INTERVAL: 366 MS
EKG Q-T INTERVAL: 382 MS
EKG Q-T INTERVAL: 396 MS
EKG Q-T INTERVAL: 400 MS
EKG Q-T INTERVAL: 424 MS
EKG QRS DURATION: 62 MS
EKG QRS DURATION: 66 MS
EKG QRS DURATION: 70 MS
EKG QRS DURATION: 70 MS
EKG QRS DURATION: 72 MS
EKG QRS DURATION: 76 MS
EKG QRS DURATION: 78 MS
EKG QRS DURATION: 80 MS
EKG QTC CALCULATION (BAZETT): 340 MS
EKG QTC CALCULATION (BAZETT): 387 MS
EKG QTC CALCULATION (BAZETT): 393 MS
EKG QTC CALCULATION (BAZETT): 410 MS
EKG QTC CALCULATION (BAZETT): 411 MS
EKG QTC CALCULATION (BAZETT): 412 MS
EKG QTC CALCULATION (BAZETT): 417 MS
EKG QTC CALCULATION (BAZETT): 421 MS
EKG QTC CALCULATION (BAZETT): 430 MS
EKG QTC CALCULATION (BAZETT): 432 MS
EKG QTC CALCULATION (BAZETT): 433 MS
EKG QTC CALCULATION (BAZETT): 443 MS
EKG R AXIS: -16 DEGREES
EKG R AXIS: -19 DEGREES
EKG R AXIS: -19 DEGREES
EKG R AXIS: -27 DEGREES
EKG R AXIS: -29 DEGREES
EKG R AXIS: -29 DEGREES
EKG R AXIS: -31 DEGREES
EKG R AXIS: -32 DEGREES
EKG R AXIS: -33 DEGREES
EKG R AXIS: -33 DEGREES
EKG R AXIS: -4 DEGREES
EKG R AXIS: 208 DEGREES
EKG T AXIS: -1 DEGREES
EKG T AXIS: -14 DEGREES
EKG T AXIS: -31 DEGREES
EKG T AXIS: 15 DEGREES
EKG T AXIS: 17 DEGREES
EKG T AXIS: 18 DEGREES
EKG T AXIS: 2 DEGREES
EKG T AXIS: 205 DEGREES
EKG T AXIS: 39 DEGREES
EKG T AXIS: 54 DEGREES
EKG T AXIS: 61 DEGREES
EKG T AXIS: 9 DEGREES
EKG VENTRICULAR RATE: 111 BPM
EKG VENTRICULAR RATE: 119 BPM
EKG VENTRICULAR RATE: 176 BPM
EKG VENTRICULAR RATE: 185 BPM
EKG VENTRICULAR RATE: 63 BPM
EKG VENTRICULAR RATE: 64 BPM
EKG VENTRICULAR RATE: 71 BPM
EKG VENTRICULAR RATE: 73 BPM
EKG VENTRICULAR RATE: 79 BPM
EKG VENTRICULAR RATE: 81 BPM
EKG VENTRICULAR RATE: 84 BPM
EKG VENTRICULAR RATE: 87 BPM
HCT VFR BLD CALC: 36.5 % (ref 37–47)
HEMOGLOBIN: 11.9 G/DL (ref 12–16)
MCH RBC QN AUTO: 29.6 PG (ref 27–31.3)
MCHC RBC AUTO-ENTMCNC: 32.6 % (ref 33–37)
MCV RBC AUTO: 91 FL (ref 82–100)
PDW BLD-RTO: 13.2 % (ref 11.5–14.5)
PLATELET # BLD: 365 K/UL (ref 130–400)
RBC # BLD: 4.01 M/UL (ref 4.2–5.4)
T4 FREE: 0.57 NG/DL (ref 0.93–1.7)
TROPONIN: <0.01 NG/ML (ref 0–0.01)
TSH SERPL DL<=0.05 MIU/L-ACNC: 0.36 UIU/ML (ref 0.27–4.2)
WBC # BLD: 8 K/UL (ref 4.8–10.8)

## 2017-11-03 PROCEDURE — 6370000000 HC RX 637 (ALT 250 FOR IP): Performed by: INTERNAL MEDICINE

## 2017-11-03 PROCEDURE — 6360000002 HC RX W HCPCS: Performed by: PHYSICIAN ASSISTANT

## 2017-11-03 PROCEDURE — 6370000000 HC RX 637 (ALT 250 FOR IP): Performed by: PHYSICIAN ASSISTANT

## 2017-11-03 PROCEDURE — 6370000000 HC RX 637 (ALT 250 FOR IP): Performed by: PSYCHIATRY & NEUROLOGY

## 2017-11-03 PROCEDURE — 93005 ELECTROCARDIOGRAM TRACING: CPT

## 2017-11-03 PROCEDURE — 97535 SELF CARE MNGMENT TRAINING: CPT

## 2017-11-03 PROCEDURE — 84439 ASSAY OF FREE THYROXINE: CPT

## 2017-11-03 PROCEDURE — 99231 SBSQ HOSP IP/OBS SF/LOW 25: CPT | Performed by: INTERNAL MEDICINE

## 2017-11-03 PROCEDURE — 36415 COLL VENOUS BLD VENIPUNCTURE: CPT

## 2017-11-03 PROCEDURE — 84484 ASSAY OF TROPONIN QUANT: CPT

## 2017-11-03 PROCEDURE — 85027 COMPLETE CBC AUTOMATED: CPT

## 2017-11-03 PROCEDURE — 6370000000 HC RX 637 (ALT 250 FOR IP): Performed by: SPECIALIST

## 2017-11-03 PROCEDURE — 2700000000 HC OXYGEN THERAPY PER DAY

## 2017-11-03 PROCEDURE — 97110 THERAPEUTIC EXERCISES: CPT

## 2017-11-03 PROCEDURE — 2580000003 HC RX 258: Performed by: PHYSICIAN ASSISTANT

## 2017-11-03 RX ORDER — METOPROLOL TARTRATE 50 MG/1
50 TABLET, FILM COATED ORAL 2 TIMES DAILY
Qty: 60 TABLET | Refills: 3 | Status: SHIPPED | OUTPATIENT
Start: 2017-11-03 | End: 2017-11-06 | Stop reason: SDUPTHER

## 2017-11-03 RX ORDER — LEVOTHYROXINE SODIUM 0.07 MG/1
75 TABLET ORAL DAILY
Status: DISCONTINUED | OUTPATIENT
Start: 2017-11-04 | End: 2017-11-03 | Stop reason: HOSPADM

## 2017-11-03 RX ORDER — FLECAINIDE ACETATE 50 MG/1
50 TABLET ORAL EVERY 12 HOURS SCHEDULED
Qty: 60 TABLET | Refills: 3 | Status: SHIPPED | OUTPATIENT
Start: 2017-11-03 | End: 2017-11-06 | Stop reason: SDUPTHER

## 2017-11-03 RX ORDER — SULFAMETHOXAZOLE AND TRIMETHOPRIM 800; 160 MG/1; MG/1
1 TABLET ORAL 2 TIMES DAILY
Qty: 10 TABLET | Refills: 0 | Status: SHIPPED | OUTPATIENT
Start: 2017-11-03 | End: 2017-11-06 | Stop reason: SDUPTHER

## 2017-11-03 RX ORDER — LEVOTHYROXINE SODIUM 0.05 MG/1
50 TABLET ORAL DAILY
Qty: 30 TABLET | Refills: 0 | Status: SHIPPED | OUTPATIENT
Start: 2017-11-04 | End: 2017-11-06 | Stop reason: SDUPTHER

## 2017-11-03 RX ADMIN — CARBIDOPA AND LEVODOPA 1 TABLET: 25; 100 TABLET ORAL at 14:27

## 2017-11-03 RX ADMIN — LEVOTHYROXINE SODIUM 50 MCG: 50 TABLET ORAL at 06:01

## 2017-11-03 RX ADMIN — PRAMIPEXOLE DIHYDROCHLORIDE 0.5 MG: 0.5 TABLET ORAL at 14:27

## 2017-11-03 RX ADMIN — PAROXETINE HYDROCHLORIDE 40 MG: 20 TABLET, FILM COATED ORAL at 08:19

## 2017-11-03 RX ADMIN — ENOXAPARIN SODIUM 40 MG: 40 INJECTION, SOLUTION INTRAVENOUS; SUBCUTANEOUS at 08:20

## 2017-11-03 RX ADMIN — METOPROLOL TARTRATE 50 MG: 50 TABLET ORAL at 08:21

## 2017-11-03 RX ADMIN — ROTIGOTINE 1 PATCH: 2 PATCH, EXTENDED RELEASE TRANSDERMAL at 08:19

## 2017-11-03 RX ADMIN — ACETAMINOPHEN 650 MG: 325 TABLET ORAL at 02:40

## 2017-11-03 RX ADMIN — Medication 10 ML: at 08:21

## 2017-11-03 RX ADMIN — PRAMIPEXOLE DIHYDROCHLORIDE 0.5 MG: 0.5 TABLET ORAL at 08:19

## 2017-11-03 RX ADMIN — ASPIRIN 81 MG: 81 TABLET, COATED ORAL at 08:19

## 2017-11-03 RX ADMIN — FLECAINIDE ACETATE 50 MG: 100 TABLET ORAL at 08:20

## 2017-11-03 RX ADMIN — CARBIDOPA AND LEVODOPA 1 TABLET: 25; 100 TABLET ORAL at 08:20

## 2017-11-03 RX ADMIN — PANTOPRAZOLE SODIUM 20 MG: 20 TABLET, DELAYED RELEASE ORAL at 06:01

## 2017-11-03 ASSESSMENT — PAIN SCALES - GENERAL
PAINLEVEL_OUTOF10: 0

## 2017-11-03 ASSESSMENT — ENCOUNTER SYMPTOMS
SHORTNESS OF BREATH: 0
RESPIRATORY NEGATIVE: 1
GASTROINTESTINAL NEGATIVE: 1
NAUSEA: 0
BLOOD IN STOOL: 0
CHEST TIGHTNESS: 0
COUGH: 0
WHEEZING: 0
EYES NEGATIVE: 1
STRIDOR: 0

## 2017-11-03 ASSESSMENT — PAIN SCALES - WONG BAKER
WONGBAKER_NUMERICALRESPONSE: 0

## 2017-11-03 NOTE — PROGRESS NOTES
MG 2.1 10/28/2017     Troponin:    Lab Results   Component Value Date    TROPONINI <0.010 10/26/2017             Assessment:  plan  Active Hospital Problems    Diagnosis Date Noted    PSVT (paroxysmal supraventricular tachycardia) (HCC) [I47.1] 11/01/2017     Priority: High    Altered mental status [R41.82]      Priority: High    Hypothyroidism [E03.9]      Priority: Low    Hyperglycemia [R73.9] 10/27/2017     Priority: Low    Urinary retention [R33.9] 10/27/2017     Priority: Low     Plan  -okay to discharge to SNF from cardiac standpoint  -meds as written  -will follow as outpatient        Attending Supervising [de-identified] R Mitesh Menon Aj  The patient is a 78 y.o. female. I have performed a history and physical examination of the patient. I discussed the case with the nurse practitioner. I reviewed the patient's Past Medical History, Past Surgical History, Medications, and Allergies. Physical Exam:  Vitals:    11/02/17 2203 11/02/17 2205 11/03/17 0649 11/03/17 0725   BP: (!) 132/58  (!) 145/62 (!) 154/61   Pulse: 74  72 75   Resp:    17   Temp: 97.9 °F (36.6 °C) 98.2 °F (36.8 °C) 97.7 °F (36.5 °C) 98.1 °F (36.7 °C)   TempSrc:  Oral  Oral   SpO2: 97%      Weight:       Height:           Review of Systems - Respiratory ROS: no cough, shortness of breath, or wheezing  Cardiovascular ROS: no chest pain or dyspnea on exertion  Gastrointestinal ROS: negative  Pulmonary/Chest: clear to auscultation bilaterally- no wheezes, rales or rhonchi, normal air movement, no respiratory distress  Cardiovascular: normal rate, normal S1 and S2, no gallops, intact distal pulses and no carotid bruits  Abdomen: soft, non-tender, non-distended, normal bowel sounds, no masses or organomegaly    Impression/Plan  I reviewed and agree with the findings and plan documented in his note . Tolerating Flecainide and BB. No More PSVT noted. [id number] Kassandra Butts for [employer].   F/u Dr. Rosa Maria Salas    Electronically signed by Roberto Delaney MD on 11/3/17 at 9:03 AM

## 2017-11-03 NOTE — PROGRESS NOTES
Neurology Follow up    SUBJECTIVE: very few question answered, but some english noted  Follows all commands    PHYSICAL EXAM:    /71   Pulse 83   Temp 98.6 °F (37 °C) (Oral)   Resp 21   Wt 139 lb 11.2 oz (63.4 kg)   SpO2 100%   BMI 25.55 kg/m²   General Appearance:      Mental Status Exam:             Level of Alertness:   awake            Orientation:   person,                Funduscopic Exam:     Cranial Nerves, stare, decreased blink response  decresed saccades          Cranial nerve II           Visual acuity:  normal           Visual fields:  normal      Cranial nerve III           Pupils:  equal, round, reactive to light      Cranial nerves III, IV, VI           Extraocular Movements: intact      Cranial nerve V           Facial sensation:  intact      Cranial nerve VII           Facial strength: intact      Cranial nerve VIII           Hearing:  intact      Cranial nerve IX           Palate:  intact      Cranial nerve XI         Shoulder shrug:  intact      Cranial nerve XII          Tongue movement:  normal    Motor:    Drift:  absent  Motor exam is symmetrical 4 out of 5 all extremities bilaterally  Tone:  normal  Abnormal Movements:  absent            Sensory:not reliable        Pinprick             Right Upper Extremity:  normal             Left Upper Extremity:  normal             Right Lower Extremity:  normal             Left Lower Extremity:  normal           Vibration                         Touch            Proprioception                 Coordination: nor relaible, but stifff, cogwheeling          Finger/Nose   Right:  normal              Left:  normal          Heel-Knee-Shin                Right:  normal              Left:  normal          Rapid Alternating Movements              Right:  normal              Left:  normal          Gait:                       Casual:  defered                       Romberg:              Reflexes:             Deep Tendon Reflexes:             Reflexes are 2 +, ankle reflexes absent             Plantar response:                Right:  downgoing               Left:  downgoing    Vascular:  Cardiac Exam:  normal         Ct Abdomen Pelvis Wo Iv Contrast Additional Contrast? None    Result Date: 10/26/2017  CT of the Abdomen and pelvis, without intravenous contrast medium History:  72-year-old female with fever, altered mental status, and possible urinary retention. Technical Factors: CT imaging of the abdomen and pelvis were obtained and formatted as 5 mm contiguous axial images from the domes of the diaphragm to the symphysis pubis. Sagittal and coronal reconstructions were also obtained. Oral contrast medium:  None. Intravenous contrast medium:  None. Comparison:  CT abdomen and pelvis, October 19, 2017. Findings: Study limited secondary to lack of contrast medium. Lungs:  Lung consolidation posterior aspect right lung base. Subsegmental consolidation, and scarring left lung base again identified. Small to moderate hiatal hernia again identified. Liver:  Normal in size, shape, and attenuation. Gallbladder:  Not identified. Pancreas:  Normal without masses, cysts, ductal dilatation or calcification. Spleen:  Normal in size without masses or calcifications. No splenules. Kidneys:  Normal in size. .  No hydronephrosis, masses, or stones. Adrenals:  Normal. Small bowel:  Normal in caliber. Appendix:  Not visualized. Colon:  Diffuse diverticular change, greatest in sigmoid colon. Peritoneum:  No ascites, free air, or fluid collections. Vessels: Aorta normal in course and caliber. Lymph nodes: No retroperitoneal lymph node enlargement. Bladder: No wall thickening. Trevizo catheter within urinary bladder. Air identified within nondependent portion urinary bladder. Bones:  No bone lesions. Right lower lobe pneumonia with left lower lobe subsegmental atelectasis/pneumonia. Small to moderate hiatal hernia. Colonic diverticulosis with no CT evidence diverticulitis.  Air within (KIDNEYS) CLINICAL HISTORY:  RENAL FAILURE, ACUTE (KIDNEY INJURY)  N17.9 Acute renal failure superimposed on chronic kidney disease, on chronic dialysis, unspecified acute renal failure type ([address] ICD10 COMPARISONS:  NONE AVAILABLE TECHNIQUE: Transabdominal sector gray scale sonography. Sonographic imaging was performed by a registered sonographer and the images are submitted for interpretation. FINDINGS:  The right kidney measures 4.8 x 4.9 x 10.0cm. The left kidney measures 4.7 x 5.7 x 10.4cm. There is no hydronephrosis. No renal masses are identified. Renal parenchymal echotexture is within normal limits. NEGATIVE ULTRASOUND EXAMINATION OF THE KIDNEYS. Recent Labs      10/28/17   0611  10/29/17   0533   WBC  7.9  7.3   HGB  10.8*  12.0   PLT  160  176     Recent Labs      10/28/17   0611  10/29/17   0533  10/30/17   0938   NA  148*  147*  143   K  3.8  3.5  4.1   CL  111*  107  106   CO2  26  28  25   BUN  46*  23  14   CREATININE  1.03*  0.77  0.67   GLUCOSE  145*  102  161*     No results for input(s): BILITOT, ALKPHOS, AST, ALT in the last 72 hours.   Lab Results   Component Value Date    PROTIME 10.9 10/26/2017    INR 1.0 10/26/2017     No results found for: LITHIUM, DILFRTOT, VALPROATE    ASSESSMENT AND PLAN  Encephalopathy  Metabolic  Mostly Setswana speaking but able to follow commands  Non focal neuro  Very stiff, PD features, add neupro patch for now  PT on case,   No agitaton  Some better with neupro  Rehab soon

## 2017-11-03 NOTE — PROGRESS NOTES
Endocrinology Progress Note    Assessment and Plan:   Assessment-  1. Hypothyroidism        Plan-  1. Synthroid 50 µg by mouth daily    POC Glucose:   Recent Labs      10/31/17   2012  11/01/17   0616  11/01/17   0922  11/02/17   0346   POCGLU  96  89  94  82     HGBA1C:  No results for input(s): LABA1C in the last 72 hours. CBC:   Recent Labs      11/01/17 0611 11/03/17   0922   WBC  9.6  8.0   HGB  10.9*  11.9*   PLT  275  365     CMP:    Recent Labs      11/01/17   0611   NA  139   K  4.3   CL  102   CO2  24   BUN  15   CREATININE  0.72   GLUCOSE  85   CALCIUM  7.9*   LABGLOM  >60.0         CC:   Chief Complaint   Patient presents with    Fever     pt sent in by visiting physicians for fever, AMS, and possible urine retention       Subjective: Interval History: Patient is a 43-year-old woman admitted for weakness, fatigue and mental status changes. We are consult to monitor her thyroid function.     Review of systems: denies polyuria, polydipsia, ABD pain, flank pain, N/V/D, or diaphoresis  Medications:   Scheduled Meds:   [START ON 11/4/2017] levothyroxine  75 mcg Oral Daily    metoprolol tartrate  50 mg Oral BID    rotigotine  1 patch Transdermal Daily    pantoprazole  20 mg Oral QAM AC    carbidopa-levodopa  1 tablet Oral TID    pramipexole  0.5 mg Oral TID    enoxaparin  40 mg Subcutaneous Daily    flecainide  50 mg Oral 2 times per day    pill splitter   Does not apply Once    cefTRIAXone (ROCEPHIN) IV  1 g Intravenous Q24H    sodium chloride flush  10 mL Intravenous 2 times per day    aspirin  81 mg Oral Daily    PARoxetine  40 mg Oral QAM     Continuous Infusions:     Objective:   Vitals: BP (!) 154/61   Pulse 75   Temp 98.1 °F (36.7 °C) (Oral)   Resp 17   Ht 5' 1.81\" (1.57 m)   Wt 148 lb 9.4 oz (67.4 kg)   SpO2 98%   BMI 27.34 kg/m²    Wt Readings from Last 3 Encounters:   11/02/17 148 lb 9.4 oz (67.4 kg)   10/19/17 145 lb (65.8 kg)   09/22/15 142 lb 6.7 oz (64.6 kg) General appearance: alert, appears older than stated age, cooperative and mild distress  Skin: Skin color, texture, turgor normal. No rashes or lesions. Neck: no lymphadenopathy  Lungs: clear to auscultation bilaterally  Heart: regular rate and rhythm, S1, S2 normal, no murmur, click, rub or gallop  Abdomen: soft, non-tender. Bowel sounds normal. No masses,  no organomegaly.   Extremities: extremities normal, atraumatic, no cyanosis or edema    Patient Active Problem List:     Hyperglycemia     Urinary retention     Altered mental status     PSVT (paroxysmal supraventricular tachycardia) ([facility] [address]     Hypothyroidism            Electronically signed by NATHANIEL Mejia on 11/3/2017 at 2:17 PM

## 2017-11-03 NOTE — CONSULTS
[address]                     [address] [facility], [address] [facility]                                 CONSULTATION    PATIENT NAME: Jered Jackson                       :             1940  MED REC NO:   [id number]                           ROOM:           [id number]  ACCOUNT NO:   [de-identified]                          ADMISSION DATE:  10/26/2017  PROVIDER:     Sandor Chau MD    CONSULT DATE:  2017    REASON FOR CONSULT:  Management of hypothyroidism and suppressed TSH. CHIEF COMPLAINT AND HISTORY OF PRESENT ILLNESS:  The patient is a  43-year-old female who was admitted through the ER because of fever  and altered mental status and possible UTI. The patient has not been  eating well and has been losing weight over the last few days. Also  was found to be dehydrated and was lethargic. Was found to have  urinary retention with urine of 1000 mL in the bladder. Labs were  reviewed showing a TSH of 0.023 done on 10/27/2017, which was a week  ago. Prior TSH done 2 years ago was 0.034. The patient is on  Synthroid 100 mcg daily based on her outpatient medications, but we do  not have a free T4 to view. The patient is unable to provide history. History was obtained through family members who were accompanying the  patient in the room. Has a history of hypothyroidism for many years. Sees primary care, Estefany Bennett. The patient was admitted with a  provisional diagnosis of altered mental status, dehydration with renal  failure, urinary retention and sepsis. PAST MEDICAL HISTORY:  Significant for hypothyroidism, history of  urinary retention, spinal stenosis, Parkinson's disease, paralysis  agitans, osteoarthritis, myalgia, muscular wasting and diffuse  atrophy, lumbago, hypertension, _____, fatigue, depression. PAST SURGICAL HISTORY:  Negative. FAMILY HISTORY:  Noncontributory.     PERSONAL AND SOCIAL HISTORY:  The patient denies smoking, alcohol or  drug abuse.    ALLERGIES:  LATEX, PENICILLIN, VANCOMYCIN. MEDICATIONS:  Here include aspirin, Sinemet, Rocephin, Lovenox,  flecainide, Lopressor, Protonix, Paxil, Mirapex, Neupro. REVIEW OF SYSTEMS:  Other than changes in the mental status, fever,  weight loss, poor appetite, a 14-point review of systems was negative. PHYSICAL EXAMINATION:  GENERAL:  The patient appears ill with minimal verbal response. Does  do simple commands. VITAL SIGNS:  Blood pressure was 120/51, pulse rate was 72,  respiratory rate was 18, temperature 98.8. HEENT:  Tongue was very dry. No exophthalmos. NECK:  Supple. No goiter or thyromegaly. CHEST:  Lungs were clear to auscultation. No wheezing or crackles  heard. CARDIOVASCULAR:  Heart sounds were normal.  ABDOMEN:  Soft, nonobese, bowel sounds were present. EXTREMITIES:  Reveal 1+ pitting edema, both lower legs. LABORATORY DATA:  Sodium 139, potassium 4.3, chloride 102, BUN 15,  creatinine 0.72. CBC:  WBC count was 9.6, hemoglobin 10.9. ASSESSMENT:  UTI, changes in mental status, fever, Parkinson's  disease, suppressed TSH probably from excess thyroid dose versus  euthyroid sick syndrome. PLAN:  Would continue with supportive measures. Get a free T4. Lower  the dose of Synthroid to 50 mcg daily. Await results of free T4 and  further monitoring of TSH before adjustment of thyroid dosages would  be considered. Thank you for the consult. Total time spent was 50 minutes.         Radha Ryan MD    D: 11/02/2017 21:52:16       T: 11/02/2017 23:44:45     [email or web address]  Job#: [id number]     Doc#: [id number]

## 2017-11-03 NOTE — CARE COORDINATION
LSW called to pt's room per the RN. RN states Meryle Clan is stating pt's  doesn't seem to understand what is going on. [facility] workers concerned pt and  are not aware of the discharge plan. LSW called pt's son, Inez Charlton. Inez Charlton states he left his dad a phone message. LSW gave her phone to the pt's  and he talked with his son. LSW talked again to Inez Charlton and he states his father is aware and agreeable to the discharge plan. [facility] workers left before LSW talked again with pt's son. LSW notified [facility] manager, Gaby Reddy, of the situation. Pt's  came back on 1W and was looking for pt's bag. LSW and RN notified him that bag went with Meryle Clan and his wife.   Electronically signed by ADELIA Jauregui on 11/3/17 at 3:09 PM

## 2017-11-03 NOTE — DISCHARGE SUMMARY
Hospitalist Physician Discharge Summary    Patient Identification:  Farhana Tobar  : 1940  Age: 78 y.o. MRN: [id number]   Account: [de-identified]       Admit date: 10/26/2017  Discharge date: 11/3/2017    Attending provider: Sridhar Seymour MD        Primary care provider: Angelica Serrano     Admission Diagnoses:  Encephalopathy  UTI  PD  Urinary retention    Discharge Diagnoses: Active Hospital Problems    Diagnosis Date Noted    PSVT (paroxysmal supraventricular tachycardia) ([address] [I47.1] 2017     Priority: High    Altered mental status [R41.82]      Priority: High    Hypothyroidism [E03.9]     Hyperglycemia [R73.9] 10/27/2017    Urinary retention [R33.9] 10/27/2017       Past Medical History:   Diagnosis Date    Depression     Fatigue     Fibromyalgia     Hyperlipidemia     Hypertension     Hypothyroid     Levodopa-induced dyskinesia     Lumbago     Muscular wasting and disuse atrophy     Myalgia     Osteoarthritis     Paralysis agitans ([address]     Parkinson's disease ([address]     Spinal stenosis     Thyroid disease     Urinary frequency           Hospital Course:   Farhana Tobar is a 78 y.o. female admitted to [facility] on 10/26/2017 for complaints of fever, possible urinary retention and altered mental status. The patient was being evaluated by visiting physician's and was sent in. The patient's  who is at bedside, states that the patient's symptoms have been present and worsening over the past two days. The patient is lethargic, but aroused to verbal stimuli. Alert to self.   The patient had a walrdop cath inserted in the ED, which immediately drained greater than 1000cc's of urine       Consults:   cardiology, nephrology, neurology and Urology      Examination:  Vitals:    17 2203 17 2205 17 0649 17 0725   BP: (!) 132/58  (!) 145/62 (!) 154/61   Pulse: 74  72 75   Resp:    17   Temp: 97.9 °F (36.6 °C) 98.2 °F (36.8 °C) 97.7 °F (36.5 °C) 98.1 °F (36.7 °C)   TempSrc:  Oral  Oral   SpO2: 97%      Weight:       Height:           General appearance: alert, appears stated age and cooperative  CONSTITUTIONAL:  no apparent distress  ENT:  normocepalic, without obvious abnormality, atraumatic  NECK:  supple, symmetrical, trachea midline  Lungs: clear to auscultation bilaterally  Heart: regular rate and rhythm, S1, S2 normal   Abdomen: soft lax, not tender, not distended, positive bowel sounds  Extremities: extremities normal, atraumatic, no cyanosis, edema      Medications:  Current Discharge Medication List      START taking these medications    Details   flecainide (TAMBOCOR) 50 MG tablet Take 1 tablet by mouth every 12 hours  Qty: 60 tablet, Refills: 3      metoprolol tartrate (LOPRESSOR) 50 MG tablet Take 1 tablet by mouth 2 times daily  Qty: 60 tablet, Refills: 3      sulfamethoxazole-trimethoprim (BACTRIM DS) 800-160 MG per tablet Take 1 tablet by mouth 2 times daily for 10 days  Qty: 10 tablet, Refills: 0         CONTINUE these medications which have CHANGED    Details   levothyroxine (SYNTHROID) 50 MCG tablet Take 1 tablet by mouth Daily  Qty: 30 tablet, Refills: 0         CONTINUE these medications which have NOT CHANGED    Details   PARoxetine (PAXIL) 40 MG tablet Take 40 mg by mouth every morning      ALPRAZolam (XANAX) 0.5 MG tablet Take 0.5 mg by mouth 3 times daily as needed for Sleep      CALCIUM CARBONATE PO Take by mouth      gabapentin (NEURONTIN) 300 MG capsule Take 300 mg by mouth 3 times daily      tiZANidine (ZANAFLEX) 4 MG tablet Take 4 mg by mouth daily      docusate sodium (COLACE) 100 MG capsule Take 100 mg by mouth daily      pramipexole (MIRAPEX) 0.5 MG tablet Take 0.5 mg by mouth 3 times daily       solifenacin (VESICARE) 10 MG tablet Take 5 mg by mouth daily      linaclotide (LINZESS) 290 MCG CAPS capsule Take 290 mcg by mouth every morning (before breakfast)      aspirin 81 MG EC tablet Take 81 mg by mouth daily      !! obtained and formatted as 5 mm contiguous axial images from the domes of the diaphragm to the symphysis pubis. Sagittal and coronal reconstructions were also obtained. Oral contrast medium:  None. Intravenous contrast medium:  None. Comparison:  CT abdomen and pelvis, October 19, 2017. Findings: Study limited secondary to lack of contrast medium. Lungs:  Lung consolidation posterior aspect right lung base. Subsegmental consolidation, and scarring left lung base again identified. Small to moderate hiatal hernia again identified. Liver:  Normal in size, shape, and attenuation. Gallbladder:  Not identified. Pancreas:  Normal without masses, cysts, ductal dilatation or calcification. Spleen:  Normal in size without masses or calcifications. No splenules. Kidneys:  Normal in size. .  No hydronephrosis, masses, or stones. Adrenals:  Normal. Small bowel:  Normal in caliber. Appendix:  Not visualized. Colon:  Diffuse diverticular change, greatest in sigmoid colon. Peritoneum:  No ascites, free air, or fluid collections. Vessels: Aorta normal in course and caliber. Lymph nodes: No retroperitoneal lymph node enlargement. Bladder: No wall thickening. Trevizo catheter within urinary bladder. Air identified within nondependent portion urinary bladder. Bones:  No bone lesions. Right lower lobe pneumonia with left lower lobe subsegmental atelectasis/pneumonia. Small to moderate hiatal hernia. Colonic diverticulosis with no CT evidence diverticulitis. Air within urinary bladder most likely secondary to instrumentation/Trevizo catheter placement. All CT scans at this facility use dose modulation, iterative reconstruction, and/or weight based dosing when appropriate to reduce radiation dose to as low as reasonably achievable. Ct Head Without Contrast    Result Date: 10/26/2017  HEAD CT WITHOUT CONTRAST: 10/26/2017 CLINICAL HISTORY: Altered mental status. COMPARISON: 9/21/2015.  TECHNIQUE: Spiral unenhanced images were obtained mouth      gabapentin (NEURONTIN) 300 MG capsule Take 300 mg by mouth 3 times daily      tiZANidine (ZANAFLEX) 4 MG tablet Take 4 mg by mouth daily      docusate sodium (COLACE) 100 MG capsule Take 100 mg by mouth daily      pramipexole (MIRAPEX) 0.5 MG tablet Take 0.5 mg by mouth 3 times daily       solifenacin (VESICARE) 10 MG tablet Take 5 mg by mouth daily      linaclotide (LINZESS) 290 MCG CAPS capsule Take 290 mcg by mouth every morning (before breakfast)      aspirin 81 MG EC tablet Take 81 mg by mouth daily      !! Cholecalciferol (VITAMIN D3) 2000 units CAPS Take 1 capsule by mouth daily      carbidopa-levodopa (SINEMET)  MG per tablet Take 1 tablet by mouth 3 times daily      !! Cholecalciferol 2000 units CAPS Take by mouth      busPIRone (BUSPAR) 10 MG tablet Take 10 mg by mouth daily      albuterol sulfate  (90 Base) MCG/ACT inhaler Inhale 2 puffs into the lungs every 4 hours as needed for Wheezing      albuterol (PROVENTIL) (2.5 MG/3ML) 0.083% nebulizer solution Take 2.5 mg by nebulization every 6 hours as needed for Wheezing      bisacodyl (DULCOLAX) 10 MG suppository Place 10 mg rectally daily as needed for Constipation      omeprazole (PRILOSEC) 20 MG delayed release capsule Take 40 mg by mouth daily      sucralfate (CARAFATE) 1 GM tablet Take 1 g by mouth 4 times daily      meperidine (DEMEROL) 100 MG tablet Take 100 mg by mouth every 4 hours as needed for Pain . dronabinol (MARINOL) 2.5 MG capsule Take 2.5 mg by mouth 2 times daily (before meals)      cholestyramine (QUESTRAN) 4 g packet Take 1 packet by mouth 3 times daily      lactulose encephalopathy 10 GM/15ML SOLN solution Take 20 g by mouth 3 times daily      naloxegol (MOVANTIK) 25 MG TABS tablet Take 25 mg by mouth every morning       !! - Potential duplicate medications found. Please discuss with provider.       STOP taking these medications       diltiazem (CARDIZEM) 30 MG tablet Comments:   Reason for Stopping: dexlansoprazole (DEXILANT) 60 MG CPDR delayed release capsule Comments:   Reason for Stopping:         famotidine (PEPCID) 20 MG tablet Comments:   Reason for Stopping:             Activity: activity as tolerated, fall precautions  Diet: cardiac diet  Wound Care: as directed    Disposition: SNF Rehab  Follow-up with pcp in 1 week. Labs: as needed per PCP upon follow up visit.   Follow-up visits:   Brandee Hutton  [address] [facility] [address]  [id number] [place] [address] [phone]    In 2 weeks      Wilfrid Bone MD  [address]  [address]  [facility] [address]    In 3 weeks      [facility]  [address] [facility] [phone]    In 1 week         Note that less than 30 minutes was spent in preparing discharge papers, discussing discharge with patient, medication review, etc.    Electronically signed by Debra Aleman MD on 11/3/2017 at 9:06 AM

## 2017-11-03 NOTE — PROGRESS NOTES
Dr. Prerna Bell here, and javed 'd discharge. Orders received. Report called to Daja Vuong, and faxed sarah, plan  at  230 pm. [facility] here at 300 pm.  at bedside.  had notified the son related to  time and destination. Patient aware of NH for PT/OT. Patient stable. No acute distress. Family called per  and updated leaving.  after talking to son without any question or concerns. Will dc NCP. Left via ambulance. Stable.

## 2017-11-03 NOTE — FLOWSHEET NOTE
Am care completed. Pt tense and tremors. Diaphoretic and restless. Complete linen change and reposition.  Electronically signed by Otoniel Mejia RN on 11/3/2017 at 6:48 AM

## 2017-11-03 NOTE — PROGRESS NOTES
Assessment completed see for details. Patient without obvious discomfort or distress noted. HOB elevated. Lungs clear/dim with ft crackles noted in bases. Trace +1 edema noted BLE. Patient tolerating pureed diet/ feed/needs assist. Appetite decreased. Taking fluids well. Alert/ difficult to understand at times. PT/OT here. Plan is possible discharge today to NH. Will continue to provide safety. Call placed to Dr. Michael Acosta regarding discharge today and orders.

## 2017-11-03 NOTE — PROGRESS NOTES
+, ankle reflexes absent             Plantar response:                Right:  downgoing               Left:  downgoing    Vascular:  Cardiac Exam:  normal         Ct Abdomen Pelvis Wo Iv Contrast Additional Contrast? None    Result Date: 10/26/2017  CT of the Abdomen and pelvis, without intravenous contrast medium History:  68-year-old female with fever, altered mental status, and possible urinary retention. Technical Factors: CT imaging of the abdomen and pelvis were obtained and formatted as 5 mm contiguous axial images from the domes of the diaphragm to the symphysis pubis. Sagittal and coronal reconstructions were also obtained. Oral contrast medium:  None. Intravenous contrast medium:  None. Comparison:  CT abdomen and pelvis, October 19, 2017. Findings: Study limited secondary to lack of contrast medium. Lungs:  Lung consolidation posterior aspect right lung base. Subsegmental consolidation, and scarring left lung base again identified. Small to moderate hiatal hernia again identified. Liver:  Normal in size, shape, and attenuation. Gallbladder:  Not identified. Pancreas:  Normal without masses, cysts, ductal dilatation or calcification. Spleen:  Normal in size without masses or calcifications. No splenules. Kidneys:  Normal in size. .  No hydronephrosis, masses, or stones. Adrenals:  Normal. Small bowel:  Normal in caliber. Appendix:  Not visualized. Colon:  Diffuse diverticular change, greatest in sigmoid colon. Peritoneum:  No ascites, free air, or fluid collections. Vessels: Aorta normal in course and caliber. Lymph nodes: No retroperitoneal lymph node enlargement. Bladder: No wall thickening. Trevizo catheter within urinary bladder. Air identified within nondependent portion urinary bladder. Bones:  No bone lesions. Right lower lobe pneumonia with left lower lobe subsegmental atelectasis/pneumonia. Small to moderate hiatal hernia. Colonic diverticulosis with no CT evidence diverticulitis.  Air within

## 2017-11-03 NOTE — PROGRESS NOTES
Hospitalist Progress Note  11/3/2017 0830  Subjective:   Admit Date: 10/26/2017  PCP: Robert Mendez    Chief complaint: uti, retention, encephalopathy, PD, arrhythmia  Interval History: had anxiety this am    DIET DYSPHAGIA I PUREED; Dietary Nutrition Supplements: Standard High Calorie Oral Supplement  Dietary Nutrition Supplements: Frozen Oral Supplement    Intake/Output Summary (Last 24 hours) at 11/03/17 0910  Last data filed at 11/03/17 0545   Gross per 24 hour   Intake             1185 ml   Output             1100 ml   Net               85 ml     Medications:      IV infusion builder 75 mL/hr at 11/02/17 0650      levothyroxine  50 mcg Oral Daily    metoprolol tartrate  50 mg Oral BID    rotigotine  1 patch Transdermal Daily    pantoprazole  20 mg Oral QAM AC    carbidopa-levodopa  1 tablet Oral TID    pramipexole  0.5 mg Oral TID    enoxaparin  40 mg Subcutaneous Daily    flecainide  50 mg Oral 2 times per day    pill splitter   Does not apply Once    cefTRIAXone (ROCEPHIN) IV  1 g Intravenous Q24H    sodium chloride flush  10 mL Intravenous 2 times per day    aspirin  81 mg Oral Daily    PARoxetine  40 mg Oral QAM     Recent Labs      11/01/17   0611   WBC  9.6   HGB  10.9*   PLT  275     Recent Labs      11/01/17   0611   NA  139   K  4.3   CL  102   CO2  24   BUN  15   CREATININE  0.72   GLUCOSE  85     No results for input(s): AST, ALT, ALB, BILITOT, ALKPHOS in the last 72 hours. Troponin T: No results for input(s): TROPONINI in the last 72 hours. Pro-BNP: No results for input(s): BNP in the last 72 hours. INR: No results for input(s): INR in the last 72 hours.     Objective:     Vitals:    11/02/17 2203 11/02/17 2205 11/03/17 0649 11/03/17 0725   BP: (!) 132/58  (!) 145/62 (!) 154/61   Pulse: 74  72 75   Resp:    17   Temp: 97.9 °F (36.6 °C) 98.2 °F (36.8 °C) 97.7 °F (36.5 °C) 98.1 °F (36.7 °C)   TempSrc:  Oral  Oral   SpO2: 97%      Weight:       Height:           General appearance: alert, appears stated age and cooperative  CONSTITUTIONAL:  no apparent distress  ENT:  normocepalic, without obvious abnormality, atraumatic  NECK:  supple, symmetrical, trachea midline  Lungs: clear to auscultation bilaterally  Heart: regular rate and rhythm, S1, S2 normal   Abdomen: soft lax, not tender, not distended, positive bowel sounds  Extremities: extremities normal, atraumatic, no cyanosis, edema  Neurologic: no fnd    Assessment and Plan:   Patient Active Hospital Problem List:   Altered mental status ()       PSVT (paroxysmal supraventricular tachycardia) ([facility] [address] (11/1/2017)     Hyperglycemia (10/27/2017)     Urinary retention (10/27/2017)       Hypothyroidism ()    - cont rate control, EP cardio following  - cont iv atbx,  - urology plan for waldrop catheter and clamping trial later  - cont neuro recs  - endocrine recs for thyroid management appreciated  - pt/ot      Advance Directive: Full Code  VTE Prophylaxis: low molecular weight heparin -  continue  Discharge planning: pt/ot and dc once ok per consult teams    Active Problems:    Altered mental status    PSVT (paroxysmal supraventricular tachycardia) ([facility] [address]    Hyperglycemia    Urinary retention    Hypothyroidism      Salvador Lora MD    Additional work up or/and treatment plan may be added today or then after based on clinical progression. I am managing a portion of pt care. Some medical issues are handled by other specialists. Additional work up and treatment should be done in out pt setting by pt PCP and other out pt providers.      SIGNATURE: Salvador Lora MD PATIENT NAME: Annette Hebert   DATE: November 3, 2017 MRN: [id number]   TIME: 0830

## 2017-11-06 RX ORDER — METOPROLOL TARTRATE 50 MG/1
50 TABLET, FILM COATED ORAL 2 TIMES DAILY
COMMUNITY
End: 2018-06-20

## 2017-11-06 RX ORDER — PAROXETINE HYDROCHLORIDE 40 MG/1
40 TABLET, FILM COATED ORAL EVERY MORNING
COMMUNITY
End: 2018-07-26 | Stop reason: ALTCHOICE

## 2017-11-06 RX ORDER — CHOLESTYRAMINE 4 G/9G
1 POWDER, FOR SUSPENSION ORAL 3 TIMES DAILY
COMMUNITY
End: 2017-12-27

## 2017-11-06 RX ORDER — TRAMADOL HYDROCHLORIDE 50 MG/1
25 TABLET ORAL EVERY 6 HOURS PRN
COMMUNITY
End: 2017-11-16 | Stop reason: SDUPTHER

## 2017-11-06 RX ORDER — CHOLECALCIFEROL (VITAMIN D3) 125 MCG
CAPSULE ORAL EVERY MORNING
COMMUNITY

## 2017-11-06 RX ORDER — OMEPRAZOLE 20 MG/1
40 CAPSULE, DELAYED RELEASE ORAL DAILY
COMMUNITY
End: 2018-07-26 | Stop reason: ALTCHOICE

## 2017-11-06 RX ORDER — TIZANIDINE 4 MG/1
4 TABLET ORAL NIGHTLY
COMMUNITY
End: 2017-12-20 | Stop reason: SDUPTHER

## 2017-11-06 RX ORDER — CALCIUM CARBONATE 500(1250)
500 TABLET ORAL 2 TIMES DAILY
COMMUNITY

## 2017-11-06 RX ORDER — ACETAMINOPHEN 650 MG/1
650 SUPPOSITORY RECTAL EVERY 4 HOURS PRN
COMMUNITY
End: 2017-12-27

## 2017-11-06 RX ORDER — ALBUTEROL SULFATE 2.5 MG/3ML
2.5 SOLUTION RESPIRATORY (INHALATION) EVERY 6 HOURS PRN
COMMUNITY
End: 2017-12-27

## 2017-11-06 RX ORDER — PRAMIPEXOLE DIHYDROCHLORIDE 0.5 MG/1
0.5 TABLET ORAL 3 TIMES DAILY
COMMUNITY

## 2017-11-06 RX ORDER — ALBUTEROL SULFATE 90 UG/1
2 AEROSOL, METERED RESPIRATORY (INHALATION) EVERY 4 HOURS PRN
COMMUNITY
End: 2017-12-27

## 2017-11-06 RX ORDER — ACETAMINOPHEN 325 MG/1
650 TABLET ORAL EVERY 4 HOURS PRN
COMMUNITY
End: 2017-12-27

## 2017-11-06 RX ORDER — FLECAINIDE ACETATE 50 MG/1
50 TABLET ORAL 2 TIMES DAILY
Status: ON HOLD | COMMUNITY
End: 2018-01-11 | Stop reason: HOSPADM

## 2017-11-06 RX ORDER — GABAPENTIN 300 MG/1
300 CAPSULE ORAL 3 TIMES DAILY
COMMUNITY
End: 2017-12-20 | Stop reason: SDUPTHER

## 2017-11-06 RX ORDER — SUCRALFATE 1 G/1
1 TABLET ORAL 4 TIMES DAILY
COMMUNITY
End: 2018-07-26 | Stop reason: ALTCHOICE

## 2017-11-06 RX ORDER — SULFAMETHOXAZOLE AND TRIMETHOPRIM 800; 160 MG/1; MG/1
1 TABLET ORAL 2 TIMES DAILY
COMMUNITY
Start: 2017-11-03 | End: 2017-11-12

## 2017-11-06 RX ORDER — DRONABINOL 2.5 MG/1
2.5 CAPSULE ORAL
COMMUNITY
End: 2017-12-20 | Stop reason: SDUPTHER

## 2017-11-06 RX ORDER — OXYBUTYNIN CHLORIDE 5 MG/1
5 TABLET ORAL EVERY MORNING
COMMUNITY
End: 2017-12-27

## 2017-11-06 RX ORDER — LEVOTHYROXINE SODIUM 0.05 MG/1
50 TABLET ORAL DAILY
COMMUNITY
End: 2018-03-09 | Stop reason: ALTCHOICE

## 2017-11-06 RX ORDER — BUSPIRONE HYDROCHLORIDE 10 MG/1
10 TABLET ORAL NIGHTLY
COMMUNITY

## 2017-11-06 RX ORDER — ALPRAZOLAM 0.5 MG/1
0.5 TABLET ORAL EVERY 8 HOURS PRN
COMMUNITY
End: 2017-12-27

## 2017-11-06 NOTE — PROGRESS NOTES
Physical Therapy  Facility/Department: [facility] [id number]/[id number]  Physical Therapy Discharge      NAME: Marco Ng    : 1940 (78 y.o.)  MRN: [id number]    Account: [de-identified]  Gender: female      Patient has been discharged from acute care hospital. DC patient from current PT program.    Electronically signed by Areli Kolb PT on 17 at 12:44 PM

## 2017-11-13 ENCOUNTER — OFFICE VISIT (OUTPATIENT)
Dept: GERIATRIC MEDICINE | Age: 77
End: 2017-11-13

## 2017-11-13 DIAGNOSIS — G20 PARKINSON DISEASE (HCC): ICD-10-CM

## 2017-11-13 DIAGNOSIS — R33.9 URINARY RETENTION: Primary | ICD-10-CM

## 2017-11-13 DIAGNOSIS — N17.9 ACUTE RENAL FAILURE, UNSPECIFIED ACUTE RENAL FAILURE TYPE (HCC): ICD-10-CM

## 2017-11-13 DIAGNOSIS — A41.9 SEPSIS, DUE TO UNSPECIFIED ORGANISM: ICD-10-CM

## 2017-11-13 PROCEDURE — 1123F ACP DISCUSS/DSCN MKR DOCD: CPT | Performed by: NURSE PRACTITIONER

## 2017-11-13 PROCEDURE — 99308 SBSQ NF CARE LOW MDM 20: CPT | Performed by: NURSE PRACTITIONER

## 2017-11-13 PROCEDURE — 3017F COLORECTAL CA SCREEN DOC REV: CPT | Performed by: NURSE PRACTITIONER

## 2017-11-14 VITALS
BODY MASS INDEX: 22.82 KG/M2 | TEMPERATURE: 97.9 F | HEIGHT: 65 IN | RESPIRATION RATE: 16 BRPM | DIASTOLIC BLOOD PRESSURE: 68 MMHG | SYSTOLIC BLOOD PRESSURE: 124 MMHG | WEIGHT: 137 LBS | HEART RATE: 74 BPM | OXYGEN SATURATION: 97 %

## 2017-11-16 RX ORDER — TRAMADOL HYDROCHLORIDE 50 MG/1
25 TABLET ORAL 2 TIMES DAILY
Qty: 60 TABLET | Refills: 2 | Status: SHIPPED | OUTPATIENT
Start: 2017-11-16 | End: 2018-01-22 | Stop reason: ALTCHOICE

## 2017-11-17 ENCOUNTER — OFFICE VISIT (OUTPATIENT)
Dept: CARDIOLOGY | Age: 77
End: 2017-11-17

## 2017-11-17 VITALS
DIASTOLIC BLOOD PRESSURE: 68 MMHG | SYSTOLIC BLOOD PRESSURE: 120 MMHG | RESPIRATION RATE: 14 BRPM | OXYGEN SATURATION: 98 % | HEART RATE: 58 BPM

## 2017-11-17 DIAGNOSIS — I47.1 PSVT (PAROXYSMAL SUPRAVENTRICULAR TACHYCARDIA) (HCC): Primary | ICD-10-CM

## 2017-11-17 PROCEDURE — 1111F DSCHRG MED/CURRENT MED MERGE: CPT | Performed by: INTERNAL MEDICINE

## 2017-11-17 PROCEDURE — 3017F COLORECTAL CA SCREEN DOC REV: CPT | Performed by: INTERNAL MEDICINE

## 2017-11-17 PROCEDURE — 99213 OFFICE O/P EST LOW 20 MIN: CPT | Performed by: INTERNAL MEDICINE

## 2017-11-17 PROCEDURE — 1090F PRES/ABSN URINE INCON ASSESS: CPT | Performed by: INTERNAL MEDICINE

## 2017-11-17 PROCEDURE — G8420 CALC BMI NORM PARAMETERS: HCPCS | Performed by: INTERNAL MEDICINE

## 2017-11-17 PROCEDURE — 1036F TOBACCO NON-USER: CPT | Performed by: INTERNAL MEDICINE

## 2017-11-17 PROCEDURE — G8484 FLU IMMUNIZE NO ADMIN: HCPCS | Performed by: INTERNAL MEDICINE

## 2017-11-17 PROCEDURE — G8427 DOCREV CUR MEDS BY ELIG CLIN: HCPCS | Performed by: INTERNAL MEDICINE

## 2017-11-17 PROCEDURE — G8400 PT W/DXA NO RESULTS DOC: HCPCS | Performed by: INTERNAL MEDICINE

## 2017-11-17 PROCEDURE — 1123F ACP DISCUSS/DSCN MKR DOCD: CPT | Performed by: INTERNAL MEDICINE

## 2017-11-17 PROCEDURE — 4040F PNEUMOC VAC/ADMIN/RCVD: CPT | Performed by: INTERNAL MEDICINE

## 2017-11-17 NOTE — PROGRESS NOTES
Chief Complaint   Patient presents with    Follow-Up from [address] [place] [address]        10-26-17: Patient is a 78 y.o. female who presents with a chief complaint of MS changes/fatigue. Patient is followed on a regular basis by Dr. Amira Queen. Patient was diagnosed with urinary tract infection and apparently developed SVT and a heart rate of 180s on the second floor and transferred to the first floor after converting with adenosine. Patient and her  only Bruneian speaking. Most of the information is obtained from the chart and the staff. There is no history of  MI congestive heart failure or any arrhythmias. No history of stress test or left heart catheterization. Initial EKG shows normal sinus rhythm with poor R-wave progression. She did have a creatinine of 6.87 on presentation currently down to 1.03 and her potassium was initially 5.6 now down to 3.8. Magnesium is normal at 2.1 she does have mild anemia as well.    11-17-17: Patient presents for initial medical evaluation. Patient is followed on a regular basis by Dr. Amira Queen. Noted to have SVT during previous hospitalization, was started on flecainide . Pt denies chest pain, dyspnea, dyspnea on exertion, change in exercise capacity, fatigue,  nausea, vomiting, diarrhea, constipation, motor weakness, insomnia, weight loss, syncope, dizziness, lightheadedness, palpitations, PND, orthopnea. Compliant with meds. She is at nursing home. No nitro use. BP and hr are good. CAD is stable. No LE discoloration or ulcers. No LE edema. No CHF type symptoms. l.       s/p ECHO   Conclusions   Summary   No evidence of mitral regurgitation.   No evidence of mitral valve stenosis   No evidence of aortic valve regurgitation   No evidence of aortic valve stenosis   The left atrium is Mildly dilated.   Normal left ventricular systolic function, no regional wall motion   abnormalities, estimated ejection fraction of 65%   Normal left 58, resp. rate 14, SpO2 98 %. There is no height or weight on file to calculate BMI. Physical Examination:  General appearance - alert, well appearing, and in no distress  Mental status - alert, oriented to person, place, and time  Neck - Neck is supple, no JVD or carotid bruits. No thyromegaly or adenopathy. Chest - clear to auscultation, no wheezes, rales or rhonchi, symmetric air entry  Heart - normal rate, regular rhythm, normal S1, S2, no murmurs, rubs, clicks or gallops  Abdomen - soft, nontender, nondistended, no masses or organomegaly  Neurological - alert, oriented, normal speech, no focal findings or movement disorder noted  Extremities - peripheral pulses normal, no pedal edema, no clubbing or cyanosis  Skin - normal coloration and turgor, no rashes, no suspicious skin lesions noted      No orders of the defined types were placed in this encounter. ASSESSMENT:    1. PSVT (paroxysmal supraventricular tachycardia) (HCC)           PLAN:     Patient will need to continue to follow up with you for their general medical care and return to see me in the office in 6 months    As always, aggressive risk factor modification is strongly recommended. We should adhere to the [address] VII guidelines for HTN management and the NCEP ATP III guidelines for LDL-C management. Cardiac diet is always recommended with low fat, cholesterol, calories and sodium. Continue medications at current doses. Continue with flecainide     Will check EKG next office visit    ? Need for SVT ablation in future    Patient was advised and encouraged to check blood pressure at home or at a pharmacy, maintain a logbook, and also call us back if blood pressure are above the target ranges or if it is low. Patient clearly understands and agrees to the instructions.      Details of medical condition explained and patient was warned about adverse consequences of uncontrolled medical conditions and possible side effects of prescribed medications.

## 2017-11-20 ENCOUNTER — OFFICE VISIT (OUTPATIENT)
Dept: GERIATRIC MEDICINE | Age: 77
End: 2017-11-20

## 2017-11-20 DIAGNOSIS — R33.9 URINE RETENTION: ICD-10-CM

## 2017-11-20 DIAGNOSIS — R34 OLIGURIA: Primary | ICD-10-CM

## 2017-11-20 LAB
BILIRUBIN, URINE: NEGATIVE
BLOOD, URINE: POSITIVE
CLARITY: ABNORMAL
COLOR: YELLOW
GLUCOSE URINE: ABNORMAL
KETONES, URINE: NEGATIVE
LEUKOCYTE ESTERASE, URINE: ABNORMAL
NITRITE, URINE: NEGATIVE
PH UA: 5 (ref 4.5–8)
PROTEIN UA: NEGATIVE
SPECIFIC GRAVITY, URINE: 1.02
UROBILINOGEN, URINE: NORMAL

## 2017-11-20 PROCEDURE — 99308 SBSQ NF CARE LOW MDM 20: CPT | Performed by: NURSE PRACTITIONER

## 2017-11-20 PROCEDURE — 1123F ACP DISCUSS/DSCN MKR DOCD: CPT | Performed by: NURSE PRACTITIONER

## 2017-11-20 PROCEDURE — 3017F COLORECTAL CA SCREEN DOC REV: CPT | Performed by: NURSE PRACTITIONER

## 2017-11-21 ENCOUNTER — OFFICE VISIT (OUTPATIENT)
Dept: GERIATRIC MEDICINE | Age: 77
End: 2017-11-21

## 2017-11-21 VITALS
BODY MASS INDEX: 22.16 KG/M2 | TEMPERATURE: 98.2 F | RESPIRATION RATE: 18 BRPM | OXYGEN SATURATION: 97 % | HEART RATE: 68 BPM | HEIGHT: 65 IN | WEIGHT: 133 LBS | SYSTOLIC BLOOD PRESSURE: 125 MMHG | DIASTOLIC BLOOD PRESSURE: 67 MMHG

## 2017-11-21 DIAGNOSIS — R53.1 WEAKNESS: ICD-10-CM

## 2017-11-21 DIAGNOSIS — J15.9 BACTERIAL PNEUMONIA: ICD-10-CM

## 2017-11-21 DIAGNOSIS — I10 ESSENTIAL HYPERTENSION: ICD-10-CM

## 2017-11-21 DIAGNOSIS — R53.83 LETHARGY: Primary | ICD-10-CM

## 2017-11-21 PROCEDURE — 3017F COLORECTAL CA SCREEN DOC REV: CPT | Performed by: INTERNAL MEDICINE

## 2017-11-21 PROCEDURE — 1123F ACP DISCUSS/DSCN MKR DOCD: CPT | Performed by: INTERNAL MEDICINE

## 2017-11-21 PROCEDURE — 99305 1ST NF CARE MODERATE MDM 35: CPT | Performed by: INTERNAL MEDICINE

## 2017-11-24 PROBLEM — A41.9 SEPSIS (HCC): Status: ACTIVE | Noted: 2017-11-24

## 2017-11-24 PROBLEM — N17.9 ACUTE RENAL FAILURE (HCC): Status: ACTIVE | Noted: 2017-11-24

## 2017-11-24 PROBLEM — G20 PARKINSON DISEASE (HCC): Status: ACTIVE | Noted: 2017-11-24

## 2017-11-29 VITALS — DIASTOLIC BLOOD PRESSURE: 66 MMHG | SYSTOLIC BLOOD PRESSURE: 113 MMHG | TEMPERATURE: 97.2 F | HEART RATE: 62 BPM

## 2017-11-29 NOTE — PROGRESS NOTES
PATIENTGuadalupe Johnny : 1940 DOS: 2017     [facility] COMMU    Admission history and physical.    CHIEF COMPLAINT:  Confusion, lethargy, urinary retention. HISTORY OF PRESENT ILLNESS:  This was a 62-year-old woman known to me from prior skilled rehab stay. The patient did have confusion, encephalopathy and weakness. The patient did have evidence of urinary retention. The patient was hospitalized, did undergo evaluation for acute UTI. The patient does have catheter placed. The patient was hospitalized, stabilized. The patient had been undergoing course of rehabilitation at this facility. The patient again was sent out with change in mental status. The patient was evaluated for confusion. The patient did undergo CT abdomen and pelvis. The patient did not have evidence of acute peritoneum. The patient did have evidence of pneumonia, started on a course of antibiotic therapy. The patient was stabilized. The patient had been doing well. The patient did make gains. She has a recent urinary tract infection, as well as pneumonia. The patient had been globally weak. She was stabilized, underwent course of physical therapy, occupational therapy, was on antibiotic therapy and subsequently transferred here for ongoing care. PAST MEDICAL HISTORY:  Included depression, fatigue, fibromyalgia, hyperlipidemia, hypertension, hypothyroidism parkinsonism, dyskinesia, myalgias, weakness. SOCIAL HISTORY:  The patient is a nonsmoker, nondrinker. FAMILY HISTORY:  Positive for coronary artery disease. REVIEW OF SYSTEMS:  The patient is globally weak. At this time is not febrile. No chest pain or palpitations. No change in her bowel or bladder habits, coughing intermittently.     MEDICATIONS:  On arrival at this time included the following, she is on alprazolam 0.5 mg every 8 hours as needed, aspirin 81 mg daily, bupropion 10 mg daily, Sinemet 25/100 mg q. 3 times daily, cholestyramine 4 g daily, dronabinol 2.5 mg twice daily, flecainide 50 mg twice daily, gabapentin 300 mg 3 times daily, Synthroid 50 mcg daily, metoprolol tartrate 50 mg daily,  duloxetine 25 mg daily, Neupro patch 2 mg/hour, omeprazole 20 mg daily, oxybutynin 5 mg daily, Paxil 40 mg daily, pramipexole 0.5 mg 3 times daily, sucralfate 1 g daily, tizanidine 4 mg daily, tramadol 50 mg q.6 hours as needed. PHYSICAL EXAMINATION:  The patient's vital signs were stable. She is afebrile at 97.2, pulse 62, blood pressure 113/66. She is normocephalic, atraumatic. Pupils are equal and reactive. Oral mucosa is moist.  Chest showed no crackles, no wheezing. Cardiovascular exam showed a regular rate. Abdomen was soft, nontender. Extremities showed +1 dorsal pedal pulse. ASSESSMENT AND PLAN:  1. Lethargy. The patient will undergo course of physical therapy, occupational therapy. 2.   Pneumonia. The patient is completing course of antibiotic therapy, clinically stable. 3.   Hypertension. Blood pressure is stable. No orthostasis. 4.   Weakness. The patient will undergo course of physical therapy, occupational therapy with a goal of maximization of her functional status. Please note, hospital records, imaging reports, consultant notes, laboratory results reviewed.         Electronically Signed By: Ezra Patel M.D. on 11/24/2017 14:05:51  ______________________________  RICKY Bella/[id number]  D: 11/22/2017 10:08:26  T: 11/23/2017 02:50:31    cc: - [id number] [place] [address]

## 2017-12-07 ENCOUNTER — OFFICE VISIT (OUTPATIENT)
Dept: SURGERY | Age: 77
End: 2017-12-07

## 2017-12-07 VITALS — HEART RATE: 61 BPM | SYSTOLIC BLOOD PRESSURE: 106 MMHG | DIASTOLIC BLOOD PRESSURE: 67 MMHG

## 2017-12-07 DIAGNOSIS — E03.9 HYPOTHYROIDISM, UNSPECIFIED TYPE: Primary | ICD-10-CM

## 2017-12-07 PROCEDURE — 1036F TOBACCO NON-USER: CPT | Performed by: INTERNAL MEDICINE

## 2017-12-07 PROCEDURE — G8420 CALC BMI NORM PARAMETERS: HCPCS | Performed by: INTERNAL MEDICINE

## 2017-12-07 PROCEDURE — 4040F PNEUMOC VAC/ADMIN/RCVD: CPT | Performed by: INTERNAL MEDICINE

## 2017-12-07 PROCEDURE — G8427 DOCREV CUR MEDS BY ELIG CLIN: HCPCS | Performed by: INTERNAL MEDICINE

## 2017-12-07 PROCEDURE — 3017F COLORECTAL CA SCREEN DOC REV: CPT | Performed by: INTERNAL MEDICINE

## 2017-12-07 PROCEDURE — 1090F PRES/ABSN URINE INCON ASSESS: CPT | Performed by: INTERNAL MEDICINE

## 2017-12-07 PROCEDURE — 99213 OFFICE O/P EST LOW 20 MIN: CPT | Performed by: INTERNAL MEDICINE

## 2017-12-07 PROCEDURE — G8484 FLU IMMUNIZE NO ADMIN: HCPCS | Performed by: INTERNAL MEDICINE

## 2017-12-07 PROCEDURE — 1123F ACP DISCUSS/DSCN MKR DOCD: CPT | Performed by: INTERNAL MEDICINE

## 2017-12-07 PROCEDURE — G8400 PT W/DXA NO RESULTS DOC: HCPCS | Performed by: INTERNAL MEDICINE

## 2017-12-08 ENCOUNTER — OFFICE VISIT (OUTPATIENT)
Dept: UROLOGY | Age: 77
End: 2017-12-08

## 2017-12-08 VITALS
HEART RATE: 71 BPM | SYSTOLIC BLOOD PRESSURE: 138 MMHG | DIASTOLIC BLOOD PRESSURE: 72 MMHG | HEIGHT: 62 IN | WEIGHT: 132 LBS | BODY MASS INDEX: 24.29 KG/M2

## 2017-12-08 DIAGNOSIS — N31.9 NEUROGENIC BLADDER: Primary | ICD-10-CM

## 2017-12-08 PROCEDURE — 4040F PNEUMOC VAC/ADMIN/RCVD: CPT | Performed by: UROLOGY

## 2017-12-08 PROCEDURE — 1123F ACP DISCUSS/DSCN MKR DOCD: CPT | Performed by: UROLOGY

## 2017-12-08 PROCEDURE — 99202 OFFICE O/P NEW SF 15 MIN: CPT | Performed by: UROLOGY

## 2017-12-08 PROCEDURE — G8420 CALC BMI NORM PARAMETERS: HCPCS | Performed by: UROLOGY

## 2017-12-08 PROCEDURE — 1090F PRES/ABSN URINE INCON ASSESS: CPT | Performed by: UROLOGY

## 2017-12-08 PROCEDURE — 1036F TOBACCO NON-USER: CPT | Performed by: UROLOGY

## 2017-12-08 PROCEDURE — G8400 PT W/DXA NO RESULTS DOC: HCPCS | Performed by: UROLOGY

## 2017-12-08 PROCEDURE — G8484 FLU IMMUNIZE NO ADMIN: HCPCS | Performed by: UROLOGY

## 2017-12-08 PROCEDURE — 3017F COLORECTAL CA SCREEN DOC REV: CPT | Performed by: UROLOGY

## 2017-12-08 PROCEDURE — G8427 DOCREV CUR MEDS BY ELIG CLIN: HCPCS | Performed by: UROLOGY

## 2017-12-08 NOTE — PROGRESS NOTES
History Main Topics    Smoking status: Never Smoker    Smokeless tobacco: Never Used    Alcohol use No    Drug use: No    Sexual activity: Not on file     Other Topics Concern    Not on file     Social History Narrative    No narrative on file     No family history on file. Current Outpatient Prescriptions   Medication Sig Dispense Refill    traMADol (ULTRAM) 50 MG tablet Take 0.5 tablets by mouth 2 times daily .  60 tablet 2    acetaminophen (TYLENOL) 650 MG suppository Place 650 mg rectally every 4 hours as needed for Fever      acetaminophen (TYLENOL) 325 MG tablet Take 650 mg by mouth every 4 hours as needed for Pain      albuterol (PROVENTIL) (2.5 MG/3ML) 0.083% nebulizer solution Take 2.5 mg by nebulization every 6 hours as needed for Wheezing      ALPRAZolam (XANAX) 0.5 MG tablet Take 0.5 mg by mouth every 8 hours as needed for Sleep      aspirin 81 MG tablet Take 81 mg by mouth daily      busPIRone (BUSPAR) 10 MG tablet Take 10 mg by mouth nightly      calcium carbonate (OSCAL) 500 MG TABS tablet Take 500 mg by mouth 2 times daily      carbidopa-levodopa (SINEMET)  MG per tablet Take 1 tablet by mouth 3 times daily      cholestyramine (QUESTRAN) 4 g packet Take 1 packet by mouth 3 times daily      dronabinol (MARINOL) 2.5 MG capsule Take 2.5 mg by mouth 2 times daily (before meals)      flecainide (TAMBOCOR) 50 MG tablet Take 50 mg by mouth 2 times daily      gabapentin (NEURONTIN) 300 MG capsule Take 300 mg by mouth 3 times daily      levothyroxine (SYNTHROID) 50 MCG tablet Take 50 mcg by mouth Daily      linaclotide (LINZESS) 290 MCG CAPS capsule Take 290 mcg by mouth every morning (before breakfast)      metoprolol tartrate (LOPRESSOR) 50 MG tablet Take 50 mg by mouth 2 times daily      naloxegol (MOVANTIK) 25 MG TABS tablet Take 25 mg by mouth every morning      rotigotine (NEUPRO) 2 MG/24HR Place 1 patch onto the skin daily      omeprazole (PRILOSEC) 20 MG delayed release capsule Take 40 mg by mouth daily      oxybutynin (DITROPAN) 5 MG tablet Take 5 mg by mouth every morning      PARoxetine (PAXIL) 40 MG tablet Take 40 mg by mouth every morning      pramipexole (MIRAPEX) 0.5 MG tablet Take 0.5 mg by mouth 3 times daily      sucralfate (CARAFATE) 1 GM tablet Take 1 g by mouth 4 times daily      tiZANidine (ZANAFLEX) 4 MG tablet Take 4 mg by mouth nightly      albuterol sulfate HFA (VENTOLIN HFA) 108 (90 Base) MCG/ACT inhaler Inhale 2 puffs into the lungs every 4 hours as needed for Wheezing      Cholecalciferol (VITAMIN D3) 2000 units TABS Take by mouth every morning       No current facility-administered medications for this visit. Latex; Penicillins; and Vancomycin  All reviewed and verified by Dr Compa Lozano on today's visit    No results found for: PSA, PSADIA  No results found for this visit on 12/08/17. Physical Exam  Vitals:    12/08/17 1527   BP: 138/72   Pulse: 71   Weight: 132 lb (59.9 kg)   Height: 5' 2\" (1.575 m)     Constitutional: patient is oriented to person, place, and time. patient appears well-developed. patient has dementia not very cooperative through  phone doesn't understand questions. Ears: Adequate hearing/no hearing loss  Head: Normocephalic. Atraumatic  Neck: Normal range of motion. Cardiovascular: Normal rate, BP reviewed. Pulmonary/Chest: Normal respiratory effort   Abdominal: Not distended. Urologic Exam  Trevizo catheter draining clear urine. Musculoskeletal: Patient is a wheelchair. Extremities: No edema   Neurological: Cranial nerves intact   Skin: Skin is warm and dry. No lesions. No ulcers   Psychiatric:  Alert and oriented ×1 patient has dementia. Assessment  Neurogenic bladder with multiple trials of void which the patient has failed  Plan  Chronic indwelling Trevizo  Change monthly  Patient can also be managed with intermittent catheterization and nursing facility  Follow up p.r.n.   Greater than 50% of 20 minutes spent consulting patient face-to-face  No orders of the defined types were placed in this encounter. No orders of the defined types were placed in this encounter. Jeison Adame MD       Please note this report has been partially produced using speech recognition software  And may cause contain errors related to that system including grammar, punctuation and spelling as well as words and phrases that may seem inappropriate. If there are questions or concerns please feel free to contact me to clarify.

## 2017-12-11 NOTE — PROGRESS NOTES
warm.   Psychiatric: Her mood appears anxious. Lab Results   Component Value Date    TSH 0.358 11/01/2017       Assessment:      1.  Hypothyroidism, unspecified type             Plan:     continue synthroid 50 mcg daily   F/u in 3-6 months

## 2017-12-13 ENCOUNTER — OFFICE VISIT (OUTPATIENT)
Dept: GERIATRIC MEDICINE | Age: 77
End: 2017-12-13

## 2017-12-13 DIAGNOSIS — I10 ESSENTIAL HYPERTENSION: ICD-10-CM

## 2017-12-13 DIAGNOSIS — E03.9 HYPOTHYROIDISM, UNSPECIFIED TYPE: ICD-10-CM

## 2017-12-13 DIAGNOSIS — R33.9 URINARY RETENTION: ICD-10-CM

## 2017-12-13 DIAGNOSIS — M79.7 FIBROMYALGIA: ICD-10-CM

## 2017-12-13 DIAGNOSIS — G20 PARKINSON DISEASE (HCC): Primary | ICD-10-CM

## 2017-12-13 PROCEDURE — 99316 NF DSCHRG MGMT 30 MIN+: CPT | Performed by: NURSE PRACTITIONER

## 2017-12-14 VITALS
BODY MASS INDEX: 21.66 KG/M2 | WEIGHT: 130 LBS | HEIGHT: 65 IN | SYSTOLIC BLOOD PRESSURE: 128 MMHG | HEART RATE: 70 BPM | DIASTOLIC BLOOD PRESSURE: 74 MMHG | OXYGEN SATURATION: 97 % | TEMPERATURE: 98.1 F | RESPIRATION RATE: 18 BRPM

## 2017-12-20 RX ORDER — DRONABINOL 2.5 MG/1
2.5 CAPSULE ORAL
Qty: 40 CAPSULE | Refills: 0 | Status: SHIPPED | OUTPATIENT
Start: 2017-12-20 | End: 2018-02-06 | Stop reason: SDUPTHER

## 2017-12-20 RX ORDER — TIZANIDINE 4 MG/1
4 TABLET ORAL NIGHTLY
Qty: 20 TABLET | Refills: 0 | Status: SHIPPED | OUTPATIENT
Start: 2017-12-20 | End: 2018-09-06 | Stop reason: ALTCHOICE

## 2017-12-20 RX ORDER — GABAPENTIN 300 MG/1
300 CAPSULE ORAL 3 TIMES DAILY
Qty: 180 CAPSULE | Refills: 0 | Status: SHIPPED | OUTPATIENT
Start: 2017-12-20 | End: 2018-01-22 | Stop reason: DRUGHIGH

## 2017-12-21 RX ORDER — MEGESTROL ACETATE 20 MG/1
20 TABLET ORAL DAILY
Qty: 30 TABLET | Refills: 3 | OUTPATIENT
Start: 2017-12-21

## 2017-12-21 NOTE — TELEPHONE ENCOUNTER
Dronabinol wasn't covered by insurance pharmacist said try megace instead. Wasn't sure on the dosage you would want though.

## 2017-12-27 PROBLEM — I10 ESSENTIAL HYPERTENSION: Status: ACTIVE | Noted: 2017-12-27

## 2017-12-27 PROBLEM — E03.9 HYPOTHYROIDISM: Status: RESOLVED | Noted: 2017-12-27 | Resolved: 2017-12-27

## 2017-12-27 PROBLEM — M79.7 FIBROMYALGIA: Status: ACTIVE | Noted: 2017-12-27

## 2017-12-27 PROBLEM — E03.9 HYPOTHYROIDISM: Status: ACTIVE | Noted: 2017-12-27

## 2017-12-27 PROBLEM — N17.9 ACUTE RENAL FAILURE (HCC): Status: RESOLVED | Noted: 2017-11-24 | Resolved: 2017-12-27

## 2017-12-27 PROBLEM — A41.9 SEPSIS (HCC): Status: RESOLVED | Noted: 2017-11-24 | Resolved: 2017-12-27

## 2017-12-27 RX ORDER — LACTULOSE 10 G/15ML
10 SOLUTION ORAL EVERY EVENING
Qty: 1 BOTTLE | Refills: 0
Start: 2017-12-14

## 2017-12-27 RX ORDER — ALBUTEROL SULFATE 90 UG/1
2 AEROSOL, METERED RESPIRATORY (INHALATION) EVERY 6 HOURS PRN
Qty: 1 INHALER | Refills: 3
Start: 2017-12-13

## 2017-12-28 NOTE — PROGRESS NOTES
Ht 5' 5\" (1.651 m)   Wt 130 lb (59 kg)   SpO2 97%   BMI 21.63 kg/m²   Constitutional: Awake and alert sitting up in room. Cardiovascular: Regular rate  -c/r  Respiratory: LCTA  GI: abdomen NT ND  : no suprapubic tenderness, cloudy urine in waldrop  Extremities: no edema, DP pulses 2+/4  Mobility: NOT is able to transfer in out of bed to chair    ASSESSMENT:    1. Parkinson disease ([address]     2. Fibromyalgia     3. Hypothyroidism, unspecified type     4. Urinary retention     5. Essential hypertension         PLAN:   1. No further progression since leaving hospital  2. No pain issues  3. Stable  4. [place]  5. Stable     Orders Placed This Encounter   Medications    lactulose (CHRONULAC) 10 GM/15ML solution     Sig: Take 15 mLs by mouth every evening     Dispense:  1 Bottle     Refill:  0    albuterol sulfate HFA (VENTOLIN HFA) 108 (90 Base) MCG/ACT inhaler     Sig: Inhale 2 puffs into the lungs every 6 hours as needed for Wheezing     Dispense:  1 Inhaler     Refill:  3       Return for f/u with PCP in 3-4 days for medication refills . Pertinent POC, labs, have been reviewed  Discharge patient from facility when arrangements are made. Home Health Agency with PT/OT and Nursing if needed. Equipment needed ordered. Same medications and Send medications with patient. Narcotic cards given, I/S nurse to give cards for Class meds. F/U with PCP in 3-5 days.     Mike Hale, DNP, MSN, RN, GNP-BC, NP-C  Adult Nurse Practitioner  [facility] Isabel MARTINEZ  Department of Geriatrics  8:30am-4:30pm   [phone]  After hours Answering service [phone]

## 2018-01-02 ENCOUNTER — HOSPITAL ENCOUNTER (INPATIENT)
Age: 78
LOS: 9 days | Discharge: HOME HEALTH | DRG: 309 | End: 2018-01-11
Attending: EMERGENCY MEDICINE | Admitting: INTERNAL MEDICINE
Payer: MEDICARE

## 2018-01-02 ENCOUNTER — APPOINTMENT (OUTPATIENT)
Dept: GENERAL RADIOLOGY | Age: 78
DRG: 309 | End: 2018-01-02
Payer: MEDICARE

## 2018-01-02 DIAGNOSIS — R00.0 TACHYCARDIA: Primary | ICD-10-CM

## 2018-01-02 LAB
ALBUMIN SERPL-MCNC: 4.2 G/DL (ref 3.9–4.9)
ALP BLD-CCNC: 92 U/L (ref 40–130)
ALT SERPL-CCNC: 6 U/L (ref 0–33)
ANION GAP SERPL CALCULATED.3IONS-SCNC: 11 MEQ/L (ref 7–13)
APTT: 24.9 SEC (ref 21.6–35.4)
AST SERPL-CCNC: 19 U/L (ref 0–35)
BASOPHILS ABSOLUTE: 0.1 K/UL (ref 0–0.2)
BASOPHILS RELATIVE PERCENT: 1.1 %
BILIRUB SERPL-MCNC: 0.3 MG/DL (ref 0–1.2)
BUN BLDV-MCNC: 15 MG/DL (ref 8–23)
CALCIUM SERPL-MCNC: 9.5 MG/DL (ref 8.6–10.2)
CHLORIDE BLD-SCNC: 101 MEQ/L (ref 98–107)
CO2: 29 MEQ/L (ref 22–29)
CREAT SERPL-MCNC: 0.7 MG/DL (ref 0.5–0.9)
EOSINOPHILS ABSOLUTE: 0.1 K/UL (ref 0–0.7)
EOSINOPHILS RELATIVE PERCENT: 0.9 %
GFR AFRICAN AMERICAN: >60
GFR NON-AFRICAN AMERICAN: >60
GLOBULIN: 3.2 G/DL (ref 2.3–3.5)
GLUCOSE BLD-MCNC: 133 MG/DL (ref 74–109)
HCT VFR BLD CALC: 41.3 % (ref 37–47)
HEMOGLOBIN: 13.8 G/DL (ref 12–16)
INR BLD: 1
LACTIC ACID: 1.7 MMOL/L (ref 0.5–2.2)
LYMPHOCYTES ABSOLUTE: 1.2 K/UL (ref 1–4.8)
LYMPHOCYTES RELATIVE PERCENT: 17.9 %
MCH RBC QN AUTO: 31.6 PG (ref 27–31.3)
MCHC RBC AUTO-ENTMCNC: 33.5 % (ref 33–37)
MCV RBC AUTO: 94.3 FL (ref 82–100)
MONOCYTES ABSOLUTE: 0.6 K/UL (ref 0.2–0.8)
MONOCYTES RELATIVE PERCENT: 8.8 %
NEUTROPHILS ABSOLUTE: 5 K/UL (ref 1.4–6.5)
NEUTROPHILS RELATIVE PERCENT: 71.3 %
PDW BLD-RTO: 15.5 % (ref 11.5–14.5)
PLATELET # BLD: 199 K/UL (ref 130–400)
POTASSIUM SERPL-SCNC: 4.1 MEQ/L (ref 3.5–5.1)
PRO-BNP: 191 PG/ML
PROTHROMBIN TIME: 10 SEC (ref 8.1–13.7)
RBC # BLD: 4.37 M/UL (ref 4.2–5.4)
SODIUM BLD-SCNC: 141 MEQ/L (ref 132–144)
TOTAL CK: 89 U/L (ref 0–170)
TOTAL PROTEIN: 7.4 G/DL (ref 6.4–8.1)
TROPONIN: 0.01 NG/ML (ref 0–0.01)
TROPONIN: <0.01 NG/ML (ref 0–0.01)
TROPONIN: <0.01 NG/ML (ref 0–0.01)
WBC # BLD: 7 K/UL (ref 4.8–10.8)

## 2018-01-02 PROCEDURE — 2580000003 HC RX 258: Performed by: EMERGENCY MEDICINE

## 2018-01-02 PROCEDURE — 2060000000 HC ICU INTERMEDIATE R&B

## 2018-01-02 PROCEDURE — 83880 ASSAY OF NATRIURETIC PEPTIDE: CPT

## 2018-01-02 PROCEDURE — 2580000003 HC RX 258: Performed by: NURSE PRACTITIONER

## 2018-01-02 PROCEDURE — 99222 1ST HOSP IP/OBS MODERATE 55: CPT | Performed by: INTERNAL MEDICINE

## 2018-01-02 PROCEDURE — 82550 ASSAY OF CK (CPK): CPT

## 2018-01-02 PROCEDURE — 80053 COMPREHEN METABOLIC PANEL: CPT

## 2018-01-02 PROCEDURE — 71045 X-RAY EXAM CHEST 1 VIEW: CPT

## 2018-01-02 PROCEDURE — 6370000000 HC RX 637 (ALT 250 FOR IP): Performed by: NURSE PRACTITIONER

## 2018-01-02 PROCEDURE — 85610 PROTHROMBIN TIME: CPT

## 2018-01-02 PROCEDURE — 85730 THROMBOPLASTIN TIME PARTIAL: CPT

## 2018-01-02 PROCEDURE — 93005 ELECTROCARDIOGRAM TRACING: CPT

## 2018-01-02 PROCEDURE — 36415 COLL VENOUS BLD VENIPUNCTURE: CPT

## 2018-01-02 PROCEDURE — 99285 EMERGENCY DEPT VISIT HI MDM: CPT

## 2018-01-02 PROCEDURE — 84484 ASSAY OF TROPONIN QUANT: CPT

## 2018-01-02 PROCEDURE — 6360000002 HC RX W HCPCS: Performed by: NURSE PRACTITIONER

## 2018-01-02 PROCEDURE — 94664 DEMO&/EVAL PT USE INHALER: CPT

## 2018-01-02 PROCEDURE — 83605 ASSAY OF LACTIC ACID: CPT

## 2018-01-02 PROCEDURE — 85025 COMPLETE CBC W/AUTO DIFF WBC: CPT

## 2018-01-02 RX ORDER — LEVOTHYROXINE SODIUM 0.05 MG/1
50 TABLET ORAL DAILY
Status: DISCONTINUED | OUTPATIENT
Start: 2018-01-02 | End: 2018-01-12 | Stop reason: HOSPADM

## 2018-01-02 RX ORDER — TRAMADOL HYDROCHLORIDE 50 MG/1
25 TABLET ORAL 2 TIMES DAILY
Status: DISCONTINUED | OUTPATIENT
Start: 2018-01-02 | End: 2018-01-12 | Stop reason: HOSPADM

## 2018-01-02 RX ORDER — ALBUTEROL SULFATE 90 UG/1
2 AEROSOL, METERED RESPIRATORY (INHALATION) EVERY 6 HOURS PRN
Status: DISCONTINUED | OUTPATIENT
Start: 2018-01-02 | End: 2018-01-12 | Stop reason: HOSPADM

## 2018-01-02 RX ORDER — PAROXETINE HYDROCHLORIDE 20 MG/1
40 TABLET, FILM COATED ORAL EVERY MORNING
Status: DISCONTINUED | OUTPATIENT
Start: 2018-01-03 | End: 2018-01-12 | Stop reason: HOSPADM

## 2018-01-02 RX ORDER — CALCIUM CARBONATE 500(1250)
500 TABLET ORAL 2 TIMES DAILY
Status: DISCONTINUED | OUTPATIENT
Start: 2018-01-02 | End: 2018-01-12 | Stop reason: HOSPADM

## 2018-01-02 RX ORDER — ONDANSETRON 2 MG/ML
4 INJECTION INTRAMUSCULAR; INTRAVENOUS EVERY 6 HOURS PRN
Status: DISCONTINUED | OUTPATIENT
Start: 2018-01-02 | End: 2018-01-12 | Stop reason: HOSPADM

## 2018-01-02 RX ORDER — SODIUM CHLORIDE 9 MG/ML
INJECTION, SOLUTION INTRAVENOUS CONTINUOUS
Status: DISCONTINUED | OUTPATIENT
Start: 2018-01-02 | End: 2018-01-09

## 2018-01-02 RX ORDER — 0.9 % SODIUM CHLORIDE 0.9 %
500 INTRAVENOUS SOLUTION INTRAVENOUS ONCE
Status: COMPLETED | OUTPATIENT
Start: 2018-01-02 | End: 2018-01-02

## 2018-01-02 RX ORDER — NITROGLYCERIN 0.4 MG/1
0.4 TABLET SUBLINGUAL EVERY 5 MIN PRN
Status: DISCONTINUED | OUTPATIENT
Start: 2018-01-02 | End: 2018-01-12 | Stop reason: HOSPADM

## 2018-01-02 RX ORDER — GABAPENTIN 300 MG/1
300 CAPSULE ORAL 3 TIMES DAILY
Status: DISCONTINUED | OUTPATIENT
Start: 2018-01-02 | End: 2018-01-12 | Stop reason: HOSPADM

## 2018-01-02 RX ORDER — PRAMIPEXOLE DIHYDROCHLORIDE 0.5 MG/1
0.5 TABLET ORAL 3 TIMES DAILY
Status: DISCONTINUED | OUTPATIENT
Start: 2018-01-02 | End: 2018-01-12 | Stop reason: HOSPADM

## 2018-01-02 RX ORDER — DRONABINOL 2.5 MG/1
2.5 CAPSULE ORAL
Status: DISCONTINUED | OUTPATIENT
Start: 2018-01-02 | End: 2018-01-12 | Stop reason: HOSPADM

## 2018-01-02 RX ORDER — MORPHINE SULFATE 2 MG/ML
2 INJECTION, SOLUTION INTRAMUSCULAR; INTRAVENOUS
Status: DISCONTINUED | OUTPATIENT
Start: 2018-01-02 | End: 2018-01-12 | Stop reason: HOSPADM

## 2018-01-02 RX ORDER — SODIUM CHLORIDE 0.9 % (FLUSH) 0.9 %
10 SYRINGE (ML) INJECTION EVERY 12 HOURS SCHEDULED
Status: DISCONTINUED | OUTPATIENT
Start: 2018-01-02 | End: 2018-01-12 | Stop reason: HOSPADM

## 2018-01-02 RX ORDER — MORPHINE SULFATE 4 MG/ML
4 INJECTION, SOLUTION INTRAMUSCULAR; INTRAVENOUS
Status: DISCONTINUED | OUTPATIENT
Start: 2018-01-02 | End: 2018-01-12 | Stop reason: HOSPADM

## 2018-01-02 RX ORDER — PANTOPRAZOLE SODIUM 40 MG/1
40 TABLET, DELAYED RELEASE ORAL
Status: DISCONTINUED | OUTPATIENT
Start: 2018-01-03 | End: 2018-01-12 | Stop reason: HOSPADM

## 2018-01-02 RX ORDER — SUCRALFATE 1 G/1
1 TABLET ORAL 4 TIMES DAILY
Status: DISCONTINUED | OUTPATIENT
Start: 2018-01-02 | End: 2018-01-07

## 2018-01-02 RX ORDER — ACETAMINOPHEN 325 MG/1
650 TABLET ORAL EVERY 4 HOURS PRN
Status: DISCONTINUED | OUTPATIENT
Start: 2018-01-02 | End: 2018-01-12 | Stop reason: HOSPADM

## 2018-01-02 RX ORDER — METOPROLOL TARTRATE 50 MG/1
50 TABLET, FILM COATED ORAL 2 TIMES DAILY
Status: DISCONTINUED | OUTPATIENT
Start: 2018-01-02 | End: 2018-01-06

## 2018-01-02 RX ORDER — TIZANIDINE 4 MG/1
4 TABLET ORAL NIGHTLY
Status: DISCONTINUED | OUTPATIENT
Start: 2018-01-02 | End: 2018-01-12 | Stop reason: HOSPADM

## 2018-01-02 RX ORDER — ACETAMINOPHEN 80 MG
TABLET,CHEWABLE ORAL ONCE
Status: COMPLETED | OUTPATIENT
Start: 2018-01-02 | End: 2018-01-02

## 2018-01-02 RX ORDER — FLECAINIDE ACETATE 100 MG/1
50 TABLET ORAL 2 TIMES DAILY
Status: DISCONTINUED | OUTPATIENT
Start: 2018-01-02 | End: 2018-01-02

## 2018-01-02 RX ORDER — LACTULOSE 10 G/15ML
10 SOLUTION ORAL EVERY EVENING
Status: DISCONTINUED | OUTPATIENT
Start: 2018-01-02 | End: 2018-01-12 | Stop reason: HOSPADM

## 2018-01-02 RX ORDER — ASPIRIN 81 MG/1
81 TABLET, CHEWABLE ORAL DAILY
Status: DISCONTINUED | OUTPATIENT
Start: 2018-01-02 | End: 2018-01-12 | Stop reason: HOSPADM

## 2018-01-02 RX ORDER — SODIUM CHLORIDE 0.9 % (FLUSH) 0.9 %
10 SYRINGE (ML) INJECTION PRN
Status: DISCONTINUED | OUTPATIENT
Start: 2018-01-02 | End: 2018-01-12 | Stop reason: HOSPADM

## 2018-01-02 RX ORDER — BUSPIRONE HYDROCHLORIDE 10 MG/1
10 TABLET ORAL NIGHTLY
Status: DISCONTINUED | OUTPATIENT
Start: 2018-01-02 | End: 2018-01-12 | Stop reason: HOSPADM

## 2018-01-02 RX ADMIN — Medication: at 17:14

## 2018-01-02 RX ADMIN — TIZANIDINE 4 MG: 4 TABLET ORAL at 20:31

## 2018-01-02 RX ADMIN — Medication 500 MG: at 20:31

## 2018-01-02 RX ADMIN — DRONABINOL 2.5 MG: 2.5 CAPSULE ORAL at 17:07

## 2018-01-02 RX ADMIN — ROTIGOTINE 1 PATCH: 2 PATCH, EXTENDED RELEASE TRANSDERMAL at 17:05

## 2018-01-02 RX ADMIN — SODIUM CHLORIDE 500 ML: 9 INJECTION, SOLUTION INTRAVENOUS at 14:36

## 2018-01-02 RX ADMIN — Medication 10 ML: at 20:32

## 2018-01-02 RX ADMIN — METOPROLOL TARTRATE 50 MG: 50 TABLET ORAL at 20:31

## 2018-01-02 RX ADMIN — LACTULOSE 10 G: 20 SOLUTION ORAL at 17:06

## 2018-01-02 RX ADMIN — GABAPENTIN 300 MG: 300 CAPSULE ORAL at 17:05

## 2018-01-02 RX ADMIN — TRAMADOL HYDROCHLORIDE 25 MG: 50 TABLET, FILM COATED ORAL at 20:31

## 2018-01-02 RX ADMIN — PRAMIPEXOLE DIHYDROCHLORIDE 0.5 MG: 0.5 TABLET ORAL at 20:30

## 2018-01-02 RX ADMIN — PRAMIPEXOLE DIHYDROCHLORIDE 0.5 MG: 0.5 TABLET ORAL at 17:05

## 2018-01-02 RX ADMIN — SODIUM CHLORIDE: 9 INJECTION, SOLUTION INTRAVENOUS at 17:12

## 2018-01-02 RX ADMIN — SUCRALFATE 1 G: 1 TABLET ORAL at 20:31

## 2018-01-02 RX ADMIN — BUSPIRONE HYDROCHLORIDE 10 MG: 10 TABLET ORAL at 20:31

## 2018-01-02 RX ADMIN — CARBIDOPA AND LEVODOPA 1 TABLET: 25; 100 TABLET ORAL at 20:31

## 2018-01-02 RX ADMIN — CARBIDOPA AND LEVODOPA 1 TABLET: 25; 100 TABLET ORAL at 17:06

## 2018-01-02 RX ADMIN — GABAPENTIN 300 MG: 300 CAPSULE ORAL at 20:31

## 2018-01-02 RX ADMIN — SUCRALFATE 1 G: 1 TABLET ORAL at 17:06

## 2018-01-02 RX ADMIN — ENOXAPARIN SODIUM 40 MG: 40 INJECTION, SOLUTION INTRAVENOUS; SUBCUTANEOUS at 17:05

## 2018-01-02 ASSESSMENT — ENCOUNTER SYMPTOMS
COUGH: 0
CHEST TIGHTNESS: 1
RESPIRATORY NEGATIVE: 1
APNEA: 0
GASTROINTESTINAL NEGATIVE: 1
CHEST TIGHTNESS: 0
CHOKING: 0
STRIDOR: 0
ALLERGIC/IMMUNOLOGIC NEGATIVE: 1
WHEEZING: 0
EYES NEGATIVE: 1
NAUSEA: 0
SHORTNESS OF BREATH: 0
BLOOD IN STOOL: 0

## 2018-01-02 ASSESSMENT — PAIN SCALES - GENERAL
PAINLEVEL_OUTOF10: 6
PAINLEVEL_OUTOF10: 5
PAINLEVEL_OUTOF10: 2

## 2018-01-02 NOTE — ED TRIAGE NOTES
Pt to ER with c/o /113 at home and had c/o chest pain, tachycardia yesterday pulse was 200 and heart Dr told her to come to ER, pt is a&ox4, mainly Hebrew speaking, resp even and unlabored, states chest pain is not much at this time, lungs clear

## 2018-01-02 NOTE — H&P
History and Physical  Patient: Wes Wallace  Unit/Bed: 09/09  YOB: 1940  MRN: [id number]  Acct: [de-identified]   Admitting Diagnosis: Tachycardia [R00.0]  Admit Date:  1/2/2018  Hospital Day: 0      Chief Complaint:   Tachycardia    History of Present Illness:   78year-old cardiac service was called by cardiology she has moderate to on her and they found that she had removed in back in October 2017 she had an episode of some tachycardia and it was a change in mental status state that time. Past medical history includes tachycardia, Alzheimer's, hypertension, hypothyroidism, Parkinson's. She only speaks Tunisian some is at bedside translating along with her caregiver. She was discharged from a nursing home a couple weeks ago. She only gets up with heavy assistance and can pivot. She states that she has been feeling some fatigue and palpitations. No other symptoms that she's complaining about at this present time. PMHx:  Past Medical History:   Diagnosis Date    Depression     Fatigue     Fibromyalgia     Hyperlipidemia     Hypertension     Hypothyroid     Levodopa-induced dyskinesia     Lumbago     Muscular wasting and disuse atrophy     Myalgia     Osteoarthritis     Paralysis agitans ([address]     [facility]'s [facility]     Sepsis ([facility] 11/24/2017    Spinal stenosis     Thyroid disease     Urinary frequency        PSHx:  History reviewed. No pertinent surgical history. Social Hx:  Social History     Social History    Marital status:      Spouse name: N/A    Number of children: N/A    Years of education: N/A     Social History Main Topics    Smoking status: Never Smoker    Smokeless tobacco: Never Used    Alcohol use No    Drug use: No    Sexual activity: Not Asked     Other Topics Concern    None     Social History Narrative    None       Family Hx:  History reviewed. No pertinent family history.     Review of Systems:   Review of Systems   Unable to perform ROS: Dementia   Constitutional: Positive for fatigue. Negative for appetite change, chills, diaphoresis and fever. HENT: Negative. Eyes: Negative. Respiratory: Negative for apnea, cough, choking, chest tightness, shortness of breath, wheezing and stridor. Cardiovascular: Negative for chest pain, palpitations and leg swelling. Gastrointestinal: Negative. Endocrine: Negative. Genitourinary: Negative. Musculoskeletal: Negative. Allergic/Immunologic: Negative. Neurological: Negative. Hematological: Negative. Psychiatric/Behavioral: Negative. Physical Examination:    BP (!) 170/91   Pulse 120   Temp 98.7 °F (37.1 °C) (Oral)   Resp 18   Ht 5' 2\" (1.575 m)   Wt 132 lb (59.9 kg)   SpO2 97%   BMI 24.14 kg/m²    Physical Exam   Constitutional: She appears well-developed and well-nourished. HENT:   Head: Normocephalic. Eyes: Pupils are equal, round, and reactive to light. Neck: No JVD present. No tracheal deviation present. No thyromegaly present. Cardiovascular: Normal rate, regular rhythm, normal heart sounds and intact distal pulses. Exam reveals no gallop and no friction rub. No murmur heard. Pulmonary/Chest: Effort normal and breath sounds normal. No respiratory distress. She has no wheezes. She has no rales. She exhibits no tenderness. Abdominal: Soft. Bowel sounds are normal.   Musculoskeletal: Normal range of motion. She exhibits no edema or tenderness. Lymphadenopathy:     She has no cervical adenopathy. Neurological: She is alert. Lao speaking   Skin: Skin is warm and dry. Psychiatric: She has a normal mood and affect.         LABS:  CBC:   Lab Results   Component Value Date    WBC 7.0 01/02/2018    RBC 4.37 01/02/2018    HGB 13.8 01/02/2018    HCT 41.3 01/02/2018    MCV 94.3 01/02/2018    MCH 31.6 01/02/2018    MCHC 33.5 01/02/2018    RDW 15.5 01/02/2018     01/02/2018    MPV 9.4 09/22/2015     CBC with Differential:    Lab Results Component Value Date    WBC 7.0 01/02/2018    RBC 4.37 01/02/2018    HGB 13.8 01/02/2018    HCT 41.3 01/02/2018     01/02/2018    MCV 94.3 01/02/2018    MCH 31.6 01/02/2018    MCHC 33.5 01/02/2018    RDW 15.5 01/02/2018    LYMPHOPCT 17.9 01/02/2018    MONOPCT 8.8 01/02/2018    BASOPCT 1.1 01/02/2018    MONOSABS 0.6 01/02/2018    LYMPHSABS 1.2 01/02/2018    EOSABS 0.1 01/02/2018    BASOSABS 0.1 01/02/2018     CMP:    Lab Results   Component Value Date     11/01/2017    K 4.3 11/01/2017     11/01/2017    CO2 24 11/01/2017    BUN 15 11/01/2017    CREATININE 0.72 11/01/2017    GFRAA >60.0 11/01/2017    LABGLOM >60.0 11/01/2017    GLUCOSE 85 11/01/2017    PROT 7.5 10/26/2017    LABALBU 3.8 10/26/2017    CALCIUM 7.9 11/01/2017    BILITOT 0.4 10/26/2017    ALKPHOS 157 10/26/2017    AST 22 10/26/2017    ALT 24 10/26/2017     BMP:    Lab Results   Component Value Date     11/01/2017    K 4.3 11/01/2017     11/01/2017    CO2 24 11/01/2017    BUN 15 11/01/2017    LABALBU 3.8 10/26/2017    CREATININE 0.72 11/01/2017    CALCIUM 7.9 11/01/2017    GFRAA >60.0 11/01/2017    LABGLOM >60.0 11/01/2017    GLUCOSE 85 11/01/2017     Magnesium:    Lab Results   Component Value Date    MG 2.1 10/28/2017     Troponin:    Lab Results   Component Value Date    TROPONINI <0.010 11/03/2017       EKG:      Assessment:    Active Hospital Problems    Diagnosis Date Noted    Tachycardia [R00.0] 01/02/2018     Priority: High    PSVT (paroxysmal supraventricular tachycardia) ([facility] [I47.1] 11/01/2017     Priority: High    Essential hypertension [I10] 12/27/2017     Priority: Low    Parkinson disease ([address] Alfredo Wheeler 11/24/2017     Priority: Low    Hypothyroidism [E03.9]      Priority: Low     1. tachycardia      Echo   Summary   No evidence of mitral regurgitation.   No evidence of mitral valve stenosis   No evidence of aortic valve regurgitation   No evidence of aortic valve stenosis   The left atrium is Mildly dilated.   Normal left ventricular systolic function, no regional wall motion   abnormalities, estimated ejection fraction of 65%   Normal left ventricular size and function.   Normal left ventricular wall thickness.   Normal diastolic filling pattern for age.  Tyshawn Mary Janeh right ventricle structure and function.   Right ventricular systolic pressure of 41 mm Hg consistent with mild   pulmonary hypertension.   Signature  Plan:  1. Tele monitoring  2. EP consult  3. Monitor magnesium levels and keep greater 2.0  4.  Monitor potassium levels and keep greater than 4.0        Electronically signed by Jenn Rodriguez NP on 1/2/2018 at 2:46 PM

## 2018-01-02 NOTE — ED PROVIDER NOTES
Diagnosis Date    Depression     Fatigue     Fibromyalgia     Hyperlipidemia     Hypertension     Hypothyroid     Levodopa-induced dyskinesia     Lumbago     Muscular wasting and disuse atrophy     Myalgia     Osteoarthritis     Paralysis agitans ([facility] [address]     Parkinson's disease ([facility] [facility]     Sepsis ([facility] 11/24/2017    Spinal stenosis     Thyroid disease     Urinary frequency          SURGICAL HISTORY     History reviewed. No pertinent surgical history. CURRENT MEDICATIONS       Current Discharge Medication List      CONTINUE these medications which have NOT CHANGED    Details   albuterol sulfate HFA (VENTOLIN HFA) 108 (90 Base) MCG/ACT inhaler Inhale 2 puffs into the lungs every 6 hours as needed for Wheezing  Qty: 1 Inhaler, Refills: 3      tiZANidine (ZANAFLEX) 4 MG tablet Take 1 tablet by mouth nightly  Qty: 20 tablet, Refills: 0      gabapentin (NEURONTIN) 300 MG capsule Take 1 capsule by mouth 3 times daily  Qty: 180 capsule, Refills: 0      dronabinol (MARINOL) 2.5 MG capsule Take 1 capsule by mouth 2 times daily (before meals) . Qty: 40 capsule, Refills: 0      traMADol (ULTRAM) 50 MG tablet Take 0.5 tablets by mouth 2 times daily .   Qty: 60 tablet, Refills: 2      aspirin 81 MG tablet Take 81 mg by mouth daily      busPIRone (BUSPAR) 10 MG tablet Take 10 mg by mouth nightly      calcium carbonate (OSCAL) 500 MG TABS tablet Take 500 mg by mouth 2 times daily      carbidopa-levodopa (SINEMET)  MG per tablet Take 1 tablet by mouth 3 times daily      flecainide (TAMBOCOR) 50 MG tablet Take 50 mg by mouth 2 times daily      levothyroxine (SYNTHROID) 50 MCG tablet Take 50 mcg by mouth Daily      metoprolol tartrate (LOPRESSOR) 50 MG tablet Take 50 mg by mouth 2 times daily      rotigotine (NEUPRO) 2 MG/24HR Place 1 patch onto the skin daily      omeprazole (PRILOSEC) 20 MG delayed release capsule Take 40 mg by mouth daily      PARoxetine (PAXIL) 40 MG tablet Take 40 mg by mouth every morning      pramipexole (MIRAPEX) 0.5 MG tablet Take 0.5 mg by mouth 3 times daily      lactulose (CHRONULAC) 10 GM/15ML solution Take 15 mLs by mouth every evening  Qty: 1 Bottle, Refills: 0      sucralfate (CARAFATE) 1 GM tablet Take 1 g by mouth 4 times daily      Cholecalciferol (VITAMIN D3) 2000 units TABS Take by mouth every morning             ALLERGIES     Latex; Penicillins; and Vancomycin    FAMILY HISTORY     History reviewed. No pertinent family history. SOCIAL HISTORY       Social History     Social History    Marital status:      Spouse name: N/A    Number of children: N/A    Years of education: N/A     Social History Main Topics    Smoking status: Never Smoker    Smokeless tobacco: Never Used    Alcohol use No    Drug use: No    Sexual activity: Not Asked     Other Topics Concern    None     Social History Narrative    None       SCREENINGS             PHYSICAL EXAM    (up to 7 for level 4, 8 or more for level 5)     ED Triage Vitals [01/02/18 1344]   BP Temp Temp Source Pulse Resp SpO2 Height Weight   (!) 170/91 98.7 °F (37.1 °C) Oral 120 18 97 % 5' 2\" (1.575 m) 132 lb (59.9 kg)       Physical Exam   Constitutional: She is oriented to person, place, and time. She appears well-developed and well-nourished. No distress. HENT:   Head: Normocephalic and atraumatic. Eyes: Conjunctivae are normal. Pupils are equal, round, and reactive to light. Neck: Normal range of motion. Cardiovascular: An irregular rhythm present. Tachycardia present. Pulmonary/Chest: Effort normal and breath sounds normal.   Abdominal: Soft. Bowel sounds are normal.   Musculoskeletal: Normal range of motion. Generally weakened lower extremities as well. Neurological: She is alert and oriented to person, place, and time. No cranial nerve deficit. Coordination normal.   Skin: Skin is warm and dry. She is not diaphoretic. Psychiatric: She has a normal mood and affect.  Her behavior is normal.

## 2018-01-03 ENCOUNTER — APPOINTMENT (OUTPATIENT)
Dept: GENERAL RADIOLOGY | Age: 78
DRG: 309 | End: 2018-01-03
Payer: MEDICARE

## 2018-01-03 LAB
ANION GAP SERPL CALCULATED.3IONS-SCNC: 9 MEQ/L (ref 7–13)
BACTERIA: ABNORMAL /HPF
BILIRUBIN URINE: NEGATIVE
BLOOD, URINE: ABNORMAL
BUN BLDV-MCNC: 9 MG/DL (ref 8–23)
CALCIUM SERPL-MCNC: 8.8 MG/DL (ref 8.6–10.2)
CHLORIDE BLD-SCNC: 104 MEQ/L (ref 98–107)
CHOLESTEROL, TOTAL: 196 MG/DL (ref 0–199)
CLARITY: ABNORMAL
CO2: 28 MEQ/L (ref 22–29)
COLOR: YELLOW
CREAT SERPL-MCNC: 0.62 MG/DL (ref 0.5–0.9)
CRYSTALS, UA: ABNORMAL
EPITHELIAL CELLS, UA: ABNORMAL /HPF
GFR AFRICAN AMERICAN: >60
GFR NON-AFRICAN AMERICAN: >60
GLUCOSE BLD-MCNC: 121 MG/DL (ref 60–115)
GLUCOSE BLD-MCNC: 86 MG/DL (ref 74–109)
GLUCOSE URINE: NEGATIVE MG/DL
HCT VFR BLD CALC: 32.7 % (ref 37–47)
HDLC SERPL-MCNC: 58 MG/DL (ref 40–59)
HEMOGLOBIN: 11 G/DL (ref 12–16)
KETONES, URINE: NEGATIVE MG/DL
LDL CHOLESTEROL CALCULATED: 108 MG/DL (ref 0–129)
LEUKOCYTE ESTERASE, URINE: ABNORMAL
MAGNESIUM: 2 MG/DL (ref 1.7–2.3)
MCH RBC QN AUTO: 31.9 PG (ref 27–31.3)
MCHC RBC AUTO-ENTMCNC: 33.7 % (ref 33–37)
MCV RBC AUTO: 94.8 FL (ref 82–100)
MUCUS: PRESENT
NITRITE, URINE: NEGATIVE
PDW BLD-RTO: 15.7 % (ref 11.5–14.5)
PERFORMED ON: ABNORMAL
PH UA: 8 (ref 5–9)
PLATELET # BLD: 156 K/UL (ref 130–400)
POTASSIUM SERPL-SCNC: 4.1 MEQ/L (ref 3.5–5.1)
PROTEIN UA: 30 MG/DL
RBC # BLD: 3.45 M/UL (ref 4.2–5.4)
RBC UA: ABNORMAL /HPF (ref 0–2)
SODIUM BLD-SCNC: 141 MEQ/L (ref 132–144)
SPECIFIC GRAVITY UA: 1.02 (ref 1–1.03)
TRIGL SERPL-MCNC: 150 MG/DL (ref 0–200)
UROBILINOGEN, URINE: 0.2 E.U./DL
WBC # BLD: 5.2 K/UL (ref 4.8–10.8)
WBC UA: ABNORMAL /HPF (ref 0–5)

## 2018-01-03 PROCEDURE — 80048 BASIC METABOLIC PNL TOTAL CA: CPT

## 2018-01-03 PROCEDURE — G8989 SELF CARE D/C STATUS: HCPCS

## 2018-01-03 PROCEDURE — 6360000002 HC RX W HCPCS: Performed by: NURSE PRACTITIONER

## 2018-01-03 PROCEDURE — 6370000000 HC RX 637 (ALT 250 FOR IP): Performed by: NURSE PRACTITIONER

## 2018-01-03 PROCEDURE — 6360000002 HC RX W HCPCS: Performed by: INTERNAL MEDICINE

## 2018-01-03 PROCEDURE — 97166 OT EVAL MOD COMPLEX 45 MIN: CPT

## 2018-01-03 PROCEDURE — 2700000000 HC OXYGEN THERAPY PER DAY

## 2018-01-03 PROCEDURE — G8988 SELF CARE GOAL STATUS: HCPCS

## 2018-01-03 PROCEDURE — 99221 1ST HOSP IP/OBS SF/LOW 40: CPT | Performed by: INTERNAL MEDICINE

## 2018-01-03 PROCEDURE — 81001 URINALYSIS AUTO W/SCOPE: CPT

## 2018-01-03 PROCEDURE — G8987 SELF CARE CURRENT STATUS: HCPCS

## 2018-01-03 PROCEDURE — 36415 COLL VENOUS BLD VENIPUNCTURE: CPT

## 2018-01-03 PROCEDURE — 2580000003 HC RX 258: Performed by: INTERNAL MEDICINE

## 2018-01-03 PROCEDURE — 2580000003 HC RX 258: Performed by: EMERGENCY MEDICINE

## 2018-01-03 PROCEDURE — 87186 SC STD MICRODIL/AGAR DIL: CPT

## 2018-01-03 PROCEDURE — 87086 URINE CULTURE/COLONY COUNT: CPT

## 2018-01-03 PROCEDURE — 93010 ELECTROCARDIOGRAM REPORT: CPT | Performed by: INTERNAL MEDICINE

## 2018-01-03 PROCEDURE — 99232 SBSQ HOSP IP/OBS MODERATE 35: CPT | Performed by: INTERNAL MEDICINE

## 2018-01-03 PROCEDURE — 87077 CULTURE AEROBIC IDENTIFY: CPT

## 2018-01-03 PROCEDURE — 83735 ASSAY OF MAGNESIUM: CPT

## 2018-01-03 PROCEDURE — 93005 ELECTROCARDIOGRAM TRACING: CPT

## 2018-01-03 PROCEDURE — 2060000000 HC ICU INTERMEDIATE R&B

## 2018-01-03 PROCEDURE — 85027 COMPLETE CBC AUTOMATED: CPT

## 2018-01-03 PROCEDURE — 71045 X-RAY EXAM CHEST 1 VIEW: CPT

## 2018-01-03 PROCEDURE — 80061 LIPID PANEL: CPT

## 2018-01-03 PROCEDURE — 2580000003 HC RX 258: Performed by: NURSE PRACTITIONER

## 2018-01-03 RX ADMIN — TIZANIDINE 4 MG: 4 TABLET ORAL at 20:33

## 2018-01-03 RX ADMIN — PRAMIPEXOLE DIHYDROCHLORIDE 0.5 MG: 0.5 TABLET ORAL at 20:34

## 2018-01-03 RX ADMIN — LACTULOSE 10 G: 20 SOLUTION ORAL at 16:48

## 2018-01-03 RX ADMIN — PRAMIPEXOLE DIHYDROCHLORIDE 0.5 MG: 0.5 TABLET ORAL at 10:58

## 2018-01-03 RX ADMIN — SUCRALFATE 1 G: 1 TABLET ORAL at 16:48

## 2018-01-03 RX ADMIN — CEFTRIAXONE SODIUM 1 G: 10 INJECTION, POWDER, FOR SOLUTION INTRAVENOUS at 20:55

## 2018-01-03 RX ADMIN — SODIUM CHLORIDE 100 ML/HR: 9 INJECTION, SOLUTION INTRAVENOUS at 05:29

## 2018-01-03 RX ADMIN — ASPIRIN 81 MG 81 MG: 81 TABLET ORAL at 11:00

## 2018-01-03 RX ADMIN — SODIUM CHLORIDE: 9 INJECTION, SOLUTION INTRAVENOUS at 16:49

## 2018-01-03 RX ADMIN — TRAMADOL HYDROCHLORIDE 25 MG: 50 TABLET, FILM COATED ORAL at 10:59

## 2018-01-03 RX ADMIN — DRONABINOL 2.5 MG: 2.5 CAPSULE ORAL at 11:48

## 2018-01-03 RX ADMIN — CARBIDOPA AND LEVODOPA 1 TABLET: 25; 100 TABLET ORAL at 10:59

## 2018-01-03 RX ADMIN — Medication 10 ML: at 20:51

## 2018-01-03 RX ADMIN — CARBIDOPA AND LEVODOPA 1 TABLET: 25; 100 TABLET ORAL at 16:48

## 2018-01-03 RX ADMIN — METOPROLOL TARTRATE 50 MG: 50 TABLET ORAL at 20:34

## 2018-01-03 RX ADMIN — Medication 500 MG: at 20:33

## 2018-01-03 RX ADMIN — CARBIDOPA AND LEVODOPA 1 TABLET: 25; 100 TABLET ORAL at 20:33

## 2018-01-03 RX ADMIN — GABAPENTIN 300 MG: 300 CAPSULE ORAL at 10:57

## 2018-01-03 RX ADMIN — PAROXETINE HYDROCHLORIDE 40 MG: 20 TABLET, FILM COATED ORAL at 10:59

## 2018-01-03 RX ADMIN — GABAPENTIN 300 MG: 300 CAPSULE ORAL at 16:49

## 2018-01-03 RX ADMIN — SUCRALFATE 1 G: 1 TABLET ORAL at 20:34

## 2018-01-03 RX ADMIN — PRAMIPEXOLE DIHYDROCHLORIDE 0.5 MG: 0.5 TABLET ORAL at 16:49

## 2018-01-03 RX ADMIN — GABAPENTIN 300 MG: 300 CAPSULE ORAL at 20:34

## 2018-01-03 RX ADMIN — SUCRALFATE 1 G: 1 TABLET ORAL at 11:07

## 2018-01-03 RX ADMIN — TRAMADOL HYDROCHLORIDE 25 MG: 50 TABLET, FILM COATED ORAL at 20:34

## 2018-01-03 RX ADMIN — LEVOTHYROXINE SODIUM 50 MCG: 50 TABLET ORAL at 05:53

## 2018-01-03 RX ADMIN — PANTOPRAZOLE SODIUM 40 MG: 40 TABLET, DELAYED RELEASE ORAL at 05:53

## 2018-01-03 RX ADMIN — ENOXAPARIN SODIUM 40 MG: 40 INJECTION, SOLUTION INTRAVENOUS; SUBCUTANEOUS at 10:57

## 2018-01-03 RX ADMIN — METOPROLOL TARTRATE 50 MG: 50 TABLET ORAL at 10:59

## 2018-01-03 RX ADMIN — Medication 500 MG: at 10:59

## 2018-01-03 RX ADMIN — BUSPIRONE HYDROCHLORIDE 10 MG: 10 TABLET ORAL at 20:33

## 2018-01-03 RX ADMIN — ROTIGOTINE 1 PATCH: 2 PATCH, EXTENDED RELEASE TRANSDERMAL at 10:58

## 2018-01-03 ASSESSMENT — PAIN SCALES - GENERAL
PAINLEVEL_OUTOF10: 4
PAINLEVEL_OUTOF10: 2
PAINLEVEL_OUTOF10: 0
PAINLEVEL_OUTOF10: 0

## 2018-01-03 ASSESSMENT — ENCOUNTER SYMPTOMS: RESPIRATORY NEGATIVE: 1

## 2018-01-03 NOTE — PROGRESS NOTES
[facility] OCCUPATIONAL THERAPY EVALUATION - ACUTE     Date: 1/3/2018  Patient Name: Charlene Whitfield        MRN: [id number]  Account: [de-identified]   : 1940  (78 y.o.)  Room: [place] [address]    Chart Review:  Diagnosis:  The encounter diagnosis was Tachycardia. Past Medical History:   Diagnosis Date    Depression     Fatigue     Fibromyalgia     Hyperlipidemia     Hypertension     Hypothyroid     Levodopa-induced dyskinesia     Lumbago     Muscular wasting and disuse atrophy     Myalgia     Osteoarthritis     Paralysis agitans ([facility] [address] [facility]     Parkinson's disease ([facility] [address] [facility]     Sepsis ([facility] [address] 2017    Spinal stenosis     Thyroid disease     Urinary frequency      History reviewed. No pertinent surgical history. Precautions:  Fall risk       Evaluation and Pt. rights have been reviewed: [x]Yes   [] No   If no why not:   Falls safety interventions in place  [x]Yes   [] No    Comments:     Subjective: Pt is Lao speaking only. Pt communicated with therapist via [employer]  phone. Pt's spouse present throughout session. Prior living arrangement:      Support contact: Spouse    Pt lives: [] Alone   [x] With spouse   [] Other   Comment:    Home: [] Single level   []  Two level   []  Split level     [x]  Apartment:     Entrance: Unknown. Despite 3 attempts to gather information regarding stairs using  phone, but did not provide information regarding ALIREZA or stairs inside apartment. Bathroom: [] Bath tub   [x] Tub/Shower combo   []  Shower stall    Location: Unknown  DME: [x] W/W   [x] Butch Barley   [] Rollator   []  W/C   [] Grab Bars   [x] Shower Chair   [] [facility]  [] Dressing  AE  [] Other:      Previous Functional Status:  Pt reported independence in ADLs and mobility. Pt used FWW and sits to bathe at baseline. Pt reported being independent in cooking at home. Pain:   Start of session: No pain at rest. Pt reported pain when standing at EOB. Pt did not provide pain number.    Description: Endurance: [] Good  [x] Fair  [] Poor     ADLs  Feeding: Set-up  UE Dressing: Mod A for gown management   LB Dressing:  Max A seated EOB for donning/doffing socks  Bathing:  NT  Toileting: NT; pt declined need to void (pt has waldrop; brief clean and dry)  Grooming: Set-up seat EOB    Treatment Plan will consist of:   [x] ADL Training   [x] Strengthening   [x] Endurance   [x] Transfer Training   []  DME ed      [] HEP  [] Manual Therapy   [] AROM/PROM    [x] Coordination   [] Cognitive Training   [x]Safety training   [] Other :    Goals:   Patient will:   [x]  Improve functional endurance to tolerate/complete 20 mins of ADL's   [x]  Be mod I in UB ADLs    [x]  Be min A in LB ADLs   [x]  Be supervison in ADL transfers with LRD without LOB   [x]  Be set-up in toileting tasks   [x]  Access appropriate D/C site with as few architectural barriers as possible. [x]  Sequence self-care tasks with no cues       Patient Goal: Pt stated, \"I want to get stronger. \" in Finnish to  phone   Discussed and agreed upon: [x] Yes   [] No         Comments:     Assessment/Discharge Disposition:  Assessment: Pt required mod A to safely stand at EOB. With standing pt reported pain in B feet and BLEs; pt denied need for pain medication. Pt's ability to safely complete functional transfers and ADLs are limited by decreased balance, coordination, strength, and activity tolerance. Pt would benefit from skilled OT services in acute care prior to [facility] with additional therapy services. Performance deficits / Impairments: Decreased functional mobility , Decreased ADL status, Decreased strength, Decreased endurance, Decreased balance, Decreased coordination  Prognosis: Fair  Discharge Recommendations: Continue to assess pending progress (Pt would benefit from intensive skilled rehab services upon discharge. Pt reported she can tolerate 3 hours of therapy per day.  Pt reports being motivated to participate in therapy in order to increase

## 2018-01-03 NOTE — CARE COORDINATION
LSW spoke to Pt and  was present on the blue phone (Danish). Pt was able to sign the IMM. Pt also talked that she wants to return home. Pt stated she has done therapy before and no longer wants to do therapy. Plan is home and maybe if needed therapy with C. ... LSW to follow. .Electronically signed by ADELIA Coles on 1/3/2018 at 1:08 PM

## 2018-01-03 NOTE — CONSULTS
Meds:albuterol sulfate HFA, sodium chloride flush, acetaminophen, morphine **OR** morphine, magnesium hydroxide, ondansetron, nitroGLYCERIN    Allergies   Allergen Reactions    Latex     Penicillins     Vancomycin        Social History     Social History    Marital status:      Spouse name: N/A    Number of children: N/A    Years of education: N/A     Occupational History    Not on file. Social History Main Topics    Smoking status: Never Smoker    Smokeless tobacco: Never Used    Alcohol use No    Drug use: No    Sexual activity: Not on file     Other Topics Concern    Not on file     Social History Narrative    No narrative on file       History reviewed. No pertinent family history. Review Of Systems:   CONSTITUTIONAL:  negative  EYES:  negative  HEENT:  negative  RESPIRATORY:  negative  CARDIOVASCULAR:  negative  GASTROINTESTINAL:  negative  GENITOURINARY:  negative  INTEGUMENT/BREAST:  negative  MUSCULOSKELETAL:  negative  NEUROLOGICAL:  negative  BEHAVIOR/PSYCH:  negative    Physical Exam:  Vitals:    01/02/18 2153 01/02/18 2154 01/02/18 2155 01/03/18 0840   BP: (!) 138/48 (!) 138/48     Pulse: 81   74   Resp: 16   18   Temp: 98.4 °F (36.9 °C)   98.2 °F (36.8 °C)   TempSrc: Oral   Oral   SpO2: 100%   98%   Weight:   129 lb 10.1 oz (58.8 kg)    Height:           CONSTITUTIONAL:  awake, alert, cooperative, no apparent distress, and appears stated age  ENT:  Normocephalic, without obvious abnormality, atraumatic, sinuses nontender on palpation, external ears without lesions, oral pharynx with moist mucus membranes, tonsils without erythema or exudates, gums normal and good dentition.   NECK:  Supple, symmetrical, trachea midline, no adenopathy, thyroid symmetric, not enlarged and no tenderness, skin normal  LUNGS:  No increased work of breathing, good air exchange, clear to auscultation bilaterally  CARDIOVASCULAR:  Normal apical impulse, regular rate and rhythm, normal S1 and S2  ABDOMEN:  No scars, normal bowel sounds, soft, non-distended, non-tender, no masses palpated, no hepatosplenomegally  MUSCULOSKELETAL:  There is no redness, warmth, or swelling of the joints. Full range of motion noted. NEUROLOGIC:  Awake, alert, oriented to name, place and time. Cranial nerves II-XII are grossly intact. Motor is 5 out of 5 bilaterally.   SKIN:  no bruising or bleeding, normal skin color, texture, turgor, no redness, warmth, or swelling, no rashes and no lesions    Labs:  Recent Results (from the past 24 hour(s))   EKG 12 Lead    Collection Time: 01/02/18  1:51 PM   Result Value Ref Range    Ventricular Rate 116 BPM    Atrial Rate 116 BPM    P-R Interval 132 ms    QRS Duration 68 ms    Q-T Interval 306 ms    QTc Calculation (Bazett) 425 ms    P Axis 55 degrees    R Axis -26 degrees    T Axis 14 degrees   Comprehensive Metabolic Panel    Collection Time: 01/02/18  2:30 PM   Result Value Ref Range    Sodium 141 132 - 144 mEq/L    Potassium 4.1 3.5 - 5.1 mEq/L    Chloride 101 98 - 107 mEq/L    CO2 29 22 - 29 mEq/L    Anion Gap 11 7 - 13 mEq/L    Glucose 133 (H) 74 - 109 mg/dL    BUN 15 8 - 23 mg/dL    CREATININE 0.70 0.50 - 0.90 mg/dL    GFR Non-African American >60.0 >60    GFR  >60.0 >60    Calcium 9.5 8.6 - 10.2 mg/dL    Total Protein 7.4 6.4 - 8.1 g/dL    Alb 4.2 3.9 - 4.9 g/dL    Total Bilirubin 0.3 0.0 - 1.2 mg/dL    Alkaline Phosphatase 92 40 - 130 U/L    ALT 6 0 - 33 U/L    AST 19 0 - 35 U/L    Globulin 3.2 2.3 - 3.5 g/dL   CBC Auto Differential    Collection Time: 01/02/18  2:30 PM   Result Value Ref Range    WBC 7.0 4.8 - 10.8 K/uL    RBC 4.37 4.20 - 5.40 M/uL    Hemoglobin 13.8 12.0 - 16.0 g/dL    Hematocrit 41.3 37.0 - 47.0 %    MCV 94.3 82.0 - 100.0 fL    MCH 31.6 (H) 27.0 - 31.3 pg    MCHC 33.5 33.0 - 37.0 %    RDW 15.5 (H) 11.5 - 14.5 %    Platelets 762 805 - 684 K/uL    Neutrophils % 71.3 %    Lymphocytes % 17.9 %    Monocytes % 8.8 %    Eosinophils % 0.9 % Basophils % 1.1 %    Neutrophils # 5.0 1.4 - 6.5 K/uL    Lymphocytes # 1.2 1.0 - 4.8 K/uL    Monocytes # 0.6 0.2 - 0.8 K/uL    Eosinophils # 0.1 0.0 - 0.7 K/uL    Basophils # 0.1 0.0 - 0.2 K/uL   Lactic Acid, Plasma    Collection Time: 01/02/18  2:30 PM   Result Value Ref Range    Lactic Acid 1.7 0.5 - 2.2 mmol/L   Troponin    Collection Time: 01/02/18  2:30 PM   Result Value Ref Range    Troponin <0.010 0.000 - 0.010 ng/mL   APTT    Collection Time: 01/02/18  2:30 PM   Result Value Ref Range    aPTT 24.9 21.6 - 35.4 sec   Protime-INR    Collection Time: 01/02/18  2:30 PM   Result Value Ref Range    Protime 10.0 8.1 - 13.7 sec    INR 1.0    CK    Collection Time: 01/02/18  2:30 PM   Result Value Ref Range    Total CK 89 0 - 170 U/L   Troponin    Collection Time: 01/02/18  3:25 PM   Result Value Ref Range    Troponin <0.010 0.000 - 0.010 ng/mL   Brain natriuretic peptide    Collection Time: 01/02/18  3:25 PM   Result Value Ref Range    Pro- pg/mL   Troponin    Collection Time: 01/02/18  7:34 PM   Result Value Ref Range    Troponin 0.014 (HH) 0.000 - 0.010 ng/mL   EKG 12 lead    Collection Time: 01/03/18 12:47 AM   Result Value Ref Range    Ventricular Rate 69 BPM    Atrial Rate 69 BPM    P-R Interval 148 ms    QRS Duration 84 ms    Q-T Interval 400 ms    QTc Calculation (Bazett) 428 ms    P Axis 60 degrees    R Axis -25 degrees    T Axis 22 degrees   Basic metabolic panel    Collection Time: 01/03/18  5:58 AM   Result Value Ref Range    Sodium 141 132 - 144 mEq/L    Potassium 4.1 3.5 - 5.1 mEq/L    Chloride 104 98 - 107 mEq/L    CO2 28 22 - 29 mEq/L    Anion Gap 9 7 - 13 mEq/L    Glucose 86 74 - 109 mg/dL    BUN 9 8 - 23 mg/dL    CREATININE 0.62 0.50 - 0.90 mg/dL    GFR Non-African American >60.0 >60    GFR  >60.0 >60    Calcium 8.8 8.6 - 10.2 mg/dL   Magnesium    Collection Time: 01/03/18  5:58 AM   Result Value Ref Range    Magnesium 2.0 1.7 - 2.3 mg/dL   Lipid panel - fasting    Collection

## 2018-01-04 PROCEDURE — 2580000003 HC RX 258: Performed by: NURSE PRACTITIONER

## 2018-01-04 PROCEDURE — 6360000002 HC RX W HCPCS: Performed by: NURSE PRACTITIONER

## 2018-01-04 PROCEDURE — 6360000002 HC RX W HCPCS: Performed by: INTERNAL MEDICINE

## 2018-01-04 PROCEDURE — 6370000000 HC RX 637 (ALT 250 FOR IP): Performed by: NURSE PRACTITIONER

## 2018-01-04 PROCEDURE — 2700000000 HC OXYGEN THERAPY PER DAY

## 2018-01-04 PROCEDURE — 2580000003 HC RX 258: Performed by: INTERNAL MEDICINE

## 2018-01-04 PROCEDURE — 2060000000 HC ICU INTERMEDIATE R&B

## 2018-01-04 PROCEDURE — 93005 ELECTROCARDIOGRAM TRACING: CPT

## 2018-01-04 PROCEDURE — 99232 SBSQ HOSP IP/OBS MODERATE 35: CPT | Performed by: INTERNAL MEDICINE

## 2018-01-04 PROCEDURE — 2580000003 HC RX 258: Performed by: EMERGENCY MEDICINE

## 2018-01-04 PROCEDURE — 6370000000 HC RX 637 (ALT 250 FOR IP): Performed by: INTERNAL MEDICINE

## 2018-01-04 RX ORDER — LISINOPRIL 10 MG/1
10 TABLET ORAL DAILY
Status: DISCONTINUED | OUTPATIENT
Start: 2018-01-04 | End: 2018-01-05

## 2018-01-04 RX ADMIN — LACTULOSE 10 G: 20 SOLUTION ORAL at 18:58

## 2018-01-04 RX ADMIN — Medication 500 MG: at 08:48

## 2018-01-04 RX ADMIN — ROTIGOTINE 1 PATCH: 2 PATCH, EXTENDED RELEASE TRANSDERMAL at 08:45

## 2018-01-04 RX ADMIN — LISINOPRIL 10 MG: 10 TABLET ORAL at 12:37

## 2018-01-04 RX ADMIN — CARBIDOPA AND LEVODOPA 1 TABLET: 25; 100 TABLET ORAL at 08:48

## 2018-01-04 RX ADMIN — Medication 500 MG: at 22:14

## 2018-01-04 RX ADMIN — PRAMIPEXOLE DIHYDROCHLORIDE 0.5 MG: 0.5 TABLET ORAL at 15:12

## 2018-01-04 RX ADMIN — METOPROLOL TARTRATE 50 MG: 50 TABLET ORAL at 22:14

## 2018-01-04 RX ADMIN — SUCRALFATE 1 G: 1 TABLET ORAL at 15:12

## 2018-01-04 RX ADMIN — PAROXETINE HYDROCHLORIDE 40 MG: 20 TABLET, FILM COATED ORAL at 08:48

## 2018-01-04 RX ADMIN — TIZANIDINE 4 MG: 4 TABLET ORAL at 22:14

## 2018-01-04 RX ADMIN — BUSPIRONE HYDROCHLORIDE 10 MG: 10 TABLET ORAL at 22:14

## 2018-01-04 RX ADMIN — TRAMADOL HYDROCHLORIDE 25 MG: 50 TABLET, FILM COATED ORAL at 08:48

## 2018-01-04 RX ADMIN — METOPROLOL TARTRATE 50 MG: 50 TABLET ORAL at 08:48

## 2018-01-04 RX ADMIN — ASPIRIN 81 MG 81 MG: 81 TABLET ORAL at 08:48

## 2018-01-04 RX ADMIN — CEFTRIAXONE SODIUM 1 G: 10 INJECTION, POWDER, FOR SOLUTION INTRAVENOUS at 23:45

## 2018-01-04 RX ADMIN — SODIUM CHLORIDE: 9 INJECTION, SOLUTION INTRAVENOUS at 15:26

## 2018-01-04 RX ADMIN — TRAMADOL HYDROCHLORIDE 25 MG: 50 TABLET, FILM COATED ORAL at 22:14

## 2018-01-04 RX ADMIN — CARBIDOPA AND LEVODOPA 1 TABLET: 25; 100 TABLET ORAL at 22:15

## 2018-01-04 RX ADMIN — ACETAMINOPHEN 650 MG: 325 TABLET, FILM COATED ORAL at 15:12

## 2018-01-04 RX ADMIN — GABAPENTIN 300 MG: 300 CAPSULE ORAL at 08:48

## 2018-01-04 RX ADMIN — PRAMIPEXOLE DIHYDROCHLORIDE 0.5 MG: 0.5 TABLET ORAL at 22:13

## 2018-01-04 RX ADMIN — Medication 10 ML: at 08:52

## 2018-01-04 RX ADMIN — GABAPENTIN 300 MG: 300 CAPSULE ORAL at 15:12

## 2018-01-04 RX ADMIN — LEVOTHYROXINE SODIUM 50 MCG: 50 TABLET ORAL at 05:32

## 2018-01-04 RX ADMIN — PRAMIPEXOLE DIHYDROCHLORIDE 0.5 MG: 0.5 TABLET ORAL at 08:48

## 2018-01-04 RX ADMIN — PANTOPRAZOLE SODIUM 40 MG: 40 TABLET, DELAYED RELEASE ORAL at 05:32

## 2018-01-04 RX ADMIN — Medication 10 ML: at 22:15

## 2018-01-04 RX ADMIN — DRONABINOL 2.5 MG: 2.5 CAPSULE ORAL at 23:44

## 2018-01-04 RX ADMIN — SUCRALFATE 1 G: 1 TABLET ORAL at 22:14

## 2018-01-04 RX ADMIN — DRONABINOL 2.5 MG: 2.5 CAPSULE ORAL at 11:00

## 2018-01-04 RX ADMIN — SODIUM CHLORIDE: 9 INJECTION, SOLUTION INTRAVENOUS at 04:33

## 2018-01-04 RX ADMIN — SUCRALFATE 1 G: 1 TABLET ORAL at 08:48

## 2018-01-04 RX ADMIN — CARBIDOPA AND LEVODOPA 1 TABLET: 25; 100 TABLET ORAL at 15:13

## 2018-01-04 RX ADMIN — GABAPENTIN 300 MG: 300 CAPSULE ORAL at 22:15

## 2018-01-04 RX ADMIN — ENOXAPARIN SODIUM 40 MG: 40 INJECTION, SOLUTION INTRAVENOUS; SUBCUTANEOUS at 08:45

## 2018-01-04 ASSESSMENT — PAIN DESCRIPTION - PROGRESSION
CLINICAL_PROGRESSION: NOT CHANGED

## 2018-01-04 ASSESSMENT — PAIN SCALES - GENERAL
PAINLEVEL_OUTOF10: 5
PAINLEVEL_OUTOF10: 0
PAINLEVEL_OUTOF10: 5
PAINLEVEL_OUTOF10: 2

## 2018-01-04 NOTE — PROGRESS NOTES
Urobilinogen, Urine 0.2 E.U./dL         Nitrite, Urine Negative       Leukocyte Esterase, Urine LARGE (A)                   Patient Active Problem List   Diagnosis    Hyperglycemia    Urinary retention    Altered mental status    PSVT (paroxysmal supraventricular tachycardia) (HCC)    Hypothyroidism    Parkinson disease ([facility]    Fibromyalgia    Essential hypertension    Tachycardia            PLAN:        UTI and place patient on Rocephin  Urine culture still pending

## 2018-01-04 NOTE — PROGRESS NOTES
Hospitalist Progress Note      PCP: Kathleen Carranza    Date of Admission: 1/2/2018    Interval HPI:  78year old female [address] speaking with understanding of little english. Pt states having pain in back. Denies CP or SOB. Pain in lower abdomen on palpation. Does not feel fatigued today. Subjective: :Constitutional: No fever, chills, weakness, otherwise negative  Eyes: No eye pain or redness, otherwise negative  Ears, nose, mouth, throat, and face: No ear ache, no nose bleed no sore throat otherwise negative  Respiratory: No cough, sob, hemoptysis, otherwise  negative  Cardiovascular: No chest pain, palpitation, otherwise negative  Gastrointestinal: No nausea, vomiting diarrhea otherwise negative  Genitourinary:No hematuria, no dysuria, no frequency otherwise negative  Integument/breast: No pain, discomfort, redness otherwise negative  Hematologic/lymphatic: No bleed, lymph node swelling, petechia otherwise negative  Musculoskeletal:No back, muscle or joint pain swelling, otherwise negative  Neurological: No headache, seizure, loss of consciousness otherwise negative  Behavioral/Psych: No depression, suicidal or homicidal ideation otherwise negative  Endocrine: No thyroid pain, warmth or tenderness.  No wt loss otherwise negative  Allergic/Immunologic: No sneezing, itching or rash otherwise negative        Medications:  Reviewed    Infusion Medications    sodium chloride 100 mL/hr at 01/04/18 0433     Scheduled Medications    lisinopril  10 mg Oral Daily    cefTRIAXone (ROCEPHIN) IV  1 g Intravenous Q24H    aspirin  81 mg Oral Daily    busPIRone  10 mg Oral Nightly    calcium elemental  500 mg Oral BID    carbidopa-levodopa  1 tablet Oral TID    dronabinol  2.5 mg Oral BID AC    gabapentin  300 mg Oral TID    lactulose  10 g Oral QPM    levothyroxine  50 mcg Oral Daily    metoprolol tartrate  50 mg Oral BID    pantoprazole  40 mg Oral QAM AC    PARoxetine  40 mg Oral QAM    pramipexole  0.5 mg

## 2018-01-04 NOTE — PROGRESS NOTES
Progress Note  Patient: Misty Harman  Unit/Bed: K777/V256-47  YOB: 1940  MRN: [id number]  Acct: [de-identified]   Admitting Diagnosis: Tachycardia [R00.0]  Admit Date:  1/2/2018  Hospital Day: 2    Chief Complaint:uti tachycardia    Histories:  Past Medical History:   Diagnosis Date    Depression     Fatigue     Fibromyalgia     Hyperlipidemia     Hypertension     Hypothyroid     Levodopa-induced dyskinesia     Lumbago     Muscular wasting and disuse atrophy     Myalgia     Osteoarthritis     Paralysis agitans ([facility] [address] [facility]     Parkinson's disease ([facility] [address] [facility]     Sepsis ([facility] [address] 11/24/2017    Spinal stenosis     Thyroid disease     Urinary frequency      History reviewed. No pertinent surgical history. History reviewed. No pertinent family history. Social History     Social History    Marital status:      Spouse name: N/A    Number of children: N/A    Years of education: N/A     Social History Main Topics    Smoking status: Never Smoker    Smokeless tobacco: Never Used    Alcohol use No    Drug use: No    Sexual activity: Not Asked     Other Topics Concern    None     Social History Narrative    None       Subjective/HPI:  She wants to go home. She repeately states this. No Tacycardia overnight. EKG:SR        Review of Systems:   Review of Systems  Not obtainable      Physical Examination:    BP (!) 180/77   Pulse 64   Temp 98.2 °F (36.8 °C) (Oral)   Resp 18   Ht 5' 2\" (1.575 m)   Wt 129 lb 3 oz (58.6 kg)   SpO2 99%   BMI 23.63 kg/m²    Physical Exam   Constitutional: She appears cachectic. She appears chronically ill and acutely ill. HENT:   Normal cephalic and Atraumatic   Eyes: Pupils are equal, round, and reactive to light. Neck: Normal range of motion and thyroid normal. Neck supple. No JVD present. No neck adenopathy. No thyromegaly present. Cardiovascular: Normal rate, regular rhythm, intact distal pulses and normal pulses. Murmur heard.   Pulmonary/Chest: Effort normal and breath sounds normal. She has no wheezes. She has no rales. She exhibits no tenderness. Abdominal: Soft. Bowel sounds are normal. There is no tenderness. Musculoskeletal: Normal range of motion. She exhibits no edema or tenderness. Neurological: She is alert and oriented to person, place, and time. Skin: Skin is warm. No cyanosis. Nails show no clubbing.        LABS:  CBC:   Lab Results   Component Value Date    WBC 5.2 01/03/2018    RBC 3.45 01/03/2018    HGB 11.0 01/03/2018    HCT 32.7 01/03/2018    MCV 94.8 01/03/2018    MCH 31.9 01/03/2018    MCHC 33.7 01/03/2018    RDW 15.7 01/03/2018     01/03/2018    MPV 9.4 09/22/2015     CBC with Differential:    Lab Results   Component Value Date    WBC 5.2 01/03/2018    RBC 3.45 01/03/2018    HGB 11.0 01/03/2018    HCT 32.7 01/03/2018     01/03/2018    MCV 94.8 01/03/2018    MCH 31.9 01/03/2018    MCHC 33.7 01/03/2018    RDW 15.7 01/03/2018    LYMPHOPCT 17.9 01/02/2018    MONOPCT 8.8 01/02/2018    BASOPCT 1.1 01/02/2018    MONOSABS 0.6 01/02/2018    LYMPHSABS 1.2 01/02/2018    EOSABS 0.1 01/02/2018    BASOSABS 0.1 01/02/2018     CMP:    Lab Results   Component Value Date     01/03/2018    K 4.1 01/03/2018     01/03/2018    CO2 28 01/03/2018    BUN 9 01/03/2018    CREATININE 0.62 01/03/2018    GFRAA >60.0 01/03/2018    LABGLOM >60.0 01/03/2018    GLUCOSE 86 01/03/2018    PROT 7.4 01/02/2018    LABALBU 4.2 01/02/2018    CALCIUM 8.8 01/03/2018    BILITOT 0.3 01/02/2018    ALKPHOS 92 01/02/2018    AST 19 01/02/2018    ALT 6 01/02/2018     BMP:    Lab Results   Component Value Date     01/03/2018    K 4.1 01/03/2018     01/03/2018    CO2 28 01/03/2018    BUN 9 01/03/2018    LABALBU 4.2 01/02/2018    CREATININE 0.62 01/03/2018    CALCIUM 8.8 01/03/2018    GFRAA >60.0 01/03/2018    LABGLOM >60.0 01/03/2018    GLUCOSE 86 01/03/2018     Magnesium:    Lab Results   Component Value Date    MG 2.0 01/03/2018     Troponin:    Lab

## 2018-01-05 PROCEDURE — G8978 MOBILITY CURRENT STATUS: HCPCS

## 2018-01-05 PROCEDURE — 2580000003 HC RX 258: Performed by: EMERGENCY MEDICINE

## 2018-01-05 PROCEDURE — 2700000000 HC OXYGEN THERAPY PER DAY

## 2018-01-05 PROCEDURE — 94640 AIRWAY INHALATION TREATMENT: CPT

## 2018-01-05 PROCEDURE — 99232 SBSQ HOSP IP/OBS MODERATE 35: CPT | Performed by: INTERNAL MEDICINE

## 2018-01-05 PROCEDURE — 2580000003 HC RX 258: Performed by: INTERNAL MEDICINE

## 2018-01-05 PROCEDURE — 2500000003 HC RX 250 WO HCPCS: Performed by: INTERNAL MEDICINE

## 2018-01-05 PROCEDURE — 2580000003 HC RX 258: Performed by: NURSE PRACTITIONER

## 2018-01-05 PROCEDURE — 6360000002 HC RX W HCPCS: Performed by: INTERNAL MEDICINE

## 2018-01-05 PROCEDURE — 6360000002 HC RX W HCPCS: Performed by: NURSE PRACTITIONER

## 2018-01-05 PROCEDURE — 93005 ELECTROCARDIOGRAM TRACING: CPT

## 2018-01-05 PROCEDURE — 97110 THERAPEUTIC EXERCISES: CPT

## 2018-01-05 PROCEDURE — G8979 MOBILITY GOAL STATUS: HCPCS

## 2018-01-05 PROCEDURE — 2500000003 HC RX 250 WO HCPCS: Performed by: NURSE PRACTITIONER

## 2018-01-05 PROCEDURE — 2000000000 HC ICU R&B

## 2018-01-05 PROCEDURE — 97162 PT EVAL MOD COMPLEX 30 MIN: CPT

## 2018-01-05 PROCEDURE — 6370000000 HC RX 637 (ALT 250 FOR IP): Performed by: NURSE PRACTITIONER

## 2018-01-05 RX ORDER — ALBUTEROL SULFATE 1.25 MG/3ML
1.25 SOLUTION RESPIRATORY (INHALATION) ONCE
Status: DISCONTINUED | OUTPATIENT
Start: 2018-01-05 | End: 2018-01-05

## 2018-01-05 RX ORDER — LEVALBUTEROL 1.25 MG/.5ML
1.25 SOLUTION, CONCENTRATE RESPIRATORY (INHALATION) ONCE
Status: COMPLETED | OUTPATIENT
Start: 2018-01-05 | End: 2018-01-05

## 2018-01-05 RX ORDER — MORPHINE SULFATE 4 MG/ML
4 INJECTION, SOLUTION INTRAMUSCULAR; INTRAVENOUS ONCE
Status: COMPLETED | OUTPATIENT
Start: 2018-01-05 | End: 2018-01-05

## 2018-01-05 RX ORDER — METOPROLOL TARTRATE 5 MG/5ML
5 INJECTION INTRAVENOUS ONCE
Status: COMPLETED | OUTPATIENT
Start: 2018-01-05 | End: 2018-01-05

## 2018-01-05 RX ORDER — ADENOSINE 3 MG/ML
6 INJECTION, SOLUTION INTRAVENOUS ONCE
Status: COMPLETED | OUTPATIENT
Start: 2018-01-05 | End: 2018-01-05

## 2018-01-05 RX ADMIN — PAROXETINE HYDROCHLORIDE 40 MG: 20 TABLET, FILM COATED ORAL at 08:40

## 2018-01-05 RX ADMIN — Medication 10 ML: at 11:50

## 2018-01-05 RX ADMIN — METOPROLOL TARTRATE 5 MG: 5 INJECTION INTRAVENOUS at 17:59

## 2018-01-05 RX ADMIN — CARBIDOPA AND LEVODOPA 1 TABLET: 25; 100 TABLET ORAL at 14:20

## 2018-01-05 RX ADMIN — PRAMIPEXOLE DIHYDROCHLORIDE 0.5 MG: 0.5 TABLET ORAL at 14:19

## 2018-01-05 RX ADMIN — TRAMADOL HYDROCHLORIDE 25 MG: 50 TABLET, FILM COATED ORAL at 21:16

## 2018-01-05 RX ADMIN — SODIUM CHLORIDE: 9 INJECTION, SOLUTION INTRAVENOUS at 14:22

## 2018-01-05 RX ADMIN — Medication 4 MG: at 23:38

## 2018-01-05 RX ADMIN — LEVALBUTEROL 1.25 MG: 1.25 SOLUTION, CONCENTRATE RESPIRATORY (INHALATION) at 18:25

## 2018-01-05 RX ADMIN — PANTOPRAZOLE SODIUM 40 MG: 40 TABLET, DELAYED RELEASE ORAL at 06:24

## 2018-01-05 RX ADMIN — Medication 500 MG: at 08:40

## 2018-01-05 RX ADMIN — BUSPIRONE HYDROCHLORIDE 10 MG: 10 TABLET ORAL at 21:13

## 2018-01-05 RX ADMIN — GABAPENTIN 300 MG: 300 CAPSULE ORAL at 21:14

## 2018-01-05 RX ADMIN — ASPIRIN 81 MG 81 MG: 81 TABLET ORAL at 08:40

## 2018-01-05 RX ADMIN — SODIUM CHLORIDE: 9 INJECTION, SOLUTION INTRAVENOUS at 23:38

## 2018-01-05 RX ADMIN — METOPROLOL TARTRATE 5 MG: 5 INJECTION INTRAVENOUS at 18:03

## 2018-01-05 RX ADMIN — GABAPENTIN 300 MG: 300 CAPSULE ORAL at 08:40

## 2018-01-05 RX ADMIN — MORPHINE SULFATE 4 MG: 4 INJECTION, SOLUTION INTRAMUSCULAR; INTRAVENOUS at 18:02

## 2018-01-05 RX ADMIN — PRAMIPEXOLE DIHYDROCHLORIDE 0.5 MG: 0.5 TABLET ORAL at 21:15

## 2018-01-05 RX ADMIN — DRONABINOL 2.5 MG: 2.5 CAPSULE ORAL at 09:09

## 2018-01-05 RX ADMIN — ADENOSINE 6 MG: 3 INJECTION, SOLUTION INTRAVENOUS at 18:10

## 2018-01-05 RX ADMIN — CARBIDOPA AND LEVODOPA 1 TABLET: 25; 100 TABLET ORAL at 21:14

## 2018-01-05 RX ADMIN — TRAMADOL HYDROCHLORIDE 25 MG: 50 TABLET, FILM COATED ORAL at 08:39

## 2018-01-05 RX ADMIN — LEVOTHYROXINE SODIUM 50 MCG: 50 TABLET ORAL at 06:24

## 2018-01-05 RX ADMIN — METOPROLOL TARTRATE 50 MG: 50 TABLET ORAL at 21:14

## 2018-01-05 RX ADMIN — DILTIAZEM HYDROCHLORIDE 10 MG/HR: 5 INJECTION INTRAVENOUS at 20:50

## 2018-01-05 RX ADMIN — METOPROLOL TARTRATE 50 MG: 50 TABLET ORAL at 08:40

## 2018-01-05 RX ADMIN — SODIUM CHLORIDE 100 ML/HR: 9 INJECTION, SOLUTION INTRAVENOUS at 02:35

## 2018-01-05 RX ADMIN — PRAMIPEXOLE DIHYDROCHLORIDE 0.5 MG: 0.5 TABLET ORAL at 08:40

## 2018-01-05 RX ADMIN — TOBRAMYCIN SULFATE 120 MG: 40 INJECTION, SOLUTION INTRAMUSCULAR; INTRAVENOUS at 20:57

## 2018-01-05 RX ADMIN — SUCRALFATE 1 G: 1 TABLET ORAL at 21:15

## 2018-01-05 RX ADMIN — TIZANIDINE 4 MG: 4 TABLET ORAL at 21:16

## 2018-01-05 RX ADMIN — Medication 2 MG: at 20:11

## 2018-01-05 RX ADMIN — ROTIGOTINE 1 PATCH: 2 PATCH, EXTENDED RELEASE TRANSDERMAL at 08:39

## 2018-01-05 RX ADMIN — SUCRALFATE 1 G: 1 TABLET ORAL at 08:40

## 2018-01-05 RX ADMIN — Medication 500 MG: at 21:14

## 2018-01-05 RX ADMIN — CARBIDOPA AND LEVODOPA 1 TABLET: 25; 100 TABLET ORAL at 08:40

## 2018-01-05 RX ADMIN — ENOXAPARIN SODIUM 40 MG: 40 INJECTION, SOLUTION INTRAVENOUS; SUBCUTANEOUS at 08:40

## 2018-01-05 RX ADMIN — GABAPENTIN 300 MG: 300 CAPSULE ORAL at 14:20

## 2018-01-05 RX ADMIN — SUCRALFATE 1 G: 1 TABLET ORAL at 14:19

## 2018-01-05 ASSESSMENT — PAIN SCALES - WONG BAKER
WONGBAKER_NUMERICALRESPONSE: 10
WONGBAKER_NUMERICALRESPONSE: 6
WONGBAKER_NUMERICALRESPONSE: 10

## 2018-01-05 ASSESSMENT — PAIN DESCRIPTION - PROGRESSION
CLINICAL_PROGRESSION: NOT CHANGED

## 2018-01-05 ASSESSMENT — PAIN DESCRIPTION - PAIN TYPE
TYPE: CHRONIC PAIN

## 2018-01-05 ASSESSMENT — PAIN SCALES - GENERAL
PAINLEVEL_OUTOF10: 10
PAINLEVEL_OUTOF10: 0
PAINLEVEL_OUTOF10: 0
PAINLEVEL_OUTOF10: 9
PAINLEVEL_OUTOF10: 8
PAINLEVEL_OUTOF10: 6

## 2018-01-05 ASSESSMENT — PAIN DESCRIPTION - FREQUENCY: FREQUENCY: INTERMITTENT

## 2018-01-05 ASSESSMENT — PAIN DESCRIPTION - ORIENTATION: ORIENTATION: RIGHT

## 2018-01-05 ASSESSMENT — PAIN DESCRIPTION - LOCATION: LOCATION: LEG

## 2018-01-05 NOTE — PROGRESS NOTES
INPATIENT PROGRESS NOTE    SERVICE DATE:  1/5/2018   SERVICE TIME:  4:46 PM      SUBJECTIVE    INTERVAL HPI: 78year old female who Maltese speaking and understands very little english. Spoke through . Continues to complain of back discomfort. Confused at times.     MEDICATIONS:    Current Facility-Administered Medications   Medication Dose Route Frequency Provider Last Rate Last Dose    cefTRIAXone (ROCEPHIN) 1 g in sterile water 10 mL IV syringe  1 g Intravenous Q24H Mook Jansen MD   1 g at 01/04/18 2345    0.9 % sodium chloride infusion   Intravenous Continuous Judfartun Aggarwal  mL/hr at 01/05/18 1422      albuterol sulfate  (90 Base) MCG/ACT inhaler 2 puff  2 puff Inhalation Q6H PRN Forestine Weber, NP        aspirin chewable tablet 81 mg  81 mg Oral Daily Forestine Weber, NP   81 mg at 01/05/18 0840    busPIRone (BUSPAR) tablet 10 mg  10 mg Oral Nightly Forestine Weber, NP   10 mg at 01/04/18 2214    calcium elemental (OSCAL) tablet 500 mg  500 mg Oral BID Forestine Weber, NP   500 mg at 01/05/18 0840    carbidopa-levodopa (SINEMET)  MG per tablet 1 tablet  1 tablet Oral TID Forestine Weber, NP   1 tablet at 01/05/18 1420    dronabinol (MARINOL) capsule 2.5 mg  2.5 mg Oral BID AC Forestine Weber, NP   2.5 mg at 01/05/18 9938    gabapentin (NEURONTIN) capsule 300 mg  300 mg Oral TID Forestine Weber, NP   300 mg at 01/05/18 1420    lactulose (CHRONULAC) 10 GM/15ML solution 10 g  10 g Oral QPM Forestine Weber, NP   10 g at 01/04/18 1858    levothyroxine (SYNTHROID) tablet 50 mcg  50 mcg Oral Daily Forestine Weber, NP   50 mcg at 01/05/18 5821    metoprolol tartrate (LOPRESSOR) tablet 50 mg  50 mg Oral BID Forestine Weber, NP   50 mg at 01/05/18 0840    pantoprazole (PROTONIX) tablet 40 mg  40 mg Oral QAM AC Forestine Weber, NP   40 mg at 01/05/18 0761    PARoxetine (PAXIL) tablet 40 mg  40 mg Oral QAM Forestine Weber, NP   40 mg at 01/05/18

## 2018-01-05 NOTE — PROGRESS NOTES
UTI        History of Present Illness:     Admitted for wide complex tachycardia  No shortness of breath palpitations syncope disease  Fatigue        Urinalysis was positive with 20-50 white cells per high-power field       Review of Systems   Constitutional: Negative. HENT: Negative. Respiratory: Negative. Cardiovascular: Negative. Musculoskeletal: Negative. Neurological: Negative.                  BP (!) 119/29   Pulse 74   Temp 98.2 °F (36.8 °C) (Oral)   Resp 18   Ht 5' 2\" (1.575 m)   Wt 130 lb (59 kg)   SpO2 100%   BMI 23.78 kg/m²     Neck: Normal range of motion. No JVD present. No tracheal deviation present. No thyromegaly present. Cardiovascular: Normal rate, normal heart sounds and intact distal pulses. Exam reveals no gallop. No murmur heard. Pulmonary/Chest: Effort normal. No respiratory distress. She has no wheezes. She has no rales. She exhibits no tenderness. Abdominal: She exhibits no distension and no mass. There is no tenderness.  There is no rebound and no guarding.              Component Results      Component Value Ref Range & Units Status Collected Lab   Mucus, UA Present    Final 01/03/2018  5:40 PM [address] [facility]   WBC, UA 20-50   0 - 5 /HPF Final 01/03/2018  5:40 PM [facility]   RBC, UA 20-50   0 - 2 /HPF Final 01/03/2018  5:40 PM [address] [facility]   Epi Cells 3-5  /HPF Final 01/03/2018  5:40 PM [address] [facility]   Bacteria, UA Moderate  /HPF Final 01/03/2018  5:40 PM [address] [facility]   Crystals 2+ Triple Phos                       Urinalysis [[id number]] (Abnormal) Collected: 01/03/18 1740       Specimen: Urine, clean catch Updated: 01/03/18 1753       Color, UA Yellow       Clarity, UA TURBID (A)       Glucose, Ur Negative mg/dL         Bilirubin Urine Negative       Ketones, Urine Negative mg/dL         Specific Gravity, UA 1.018       Blood, Urine LARGE (A)       pH, UA 8.0       Protein, UA 30 (A) mg/dL

## 2018-01-05 NOTE — PROGRESS NOTES
Called to see the patient due to chest pain and tachycardia. Upon arrival, pt is Romanian speaking, c/o chest pain and dyspnea. BP is 625'B systolic, -869'W. O2 Sat is 100%. Lopressor 5 IV given x2, no improvement. EKG showed SVT w/ rate in 160's. Continued to be tachycardic after vagal maneuver attempted. Adenosine dose 0.6 given x1, rate dropped to 60's. EKG done and reviewed. 20 min later had another episode but went down to 60-70's without adenosine or lopressor. Will transfer to ICU for close monitoring.      Rafal Craig, [facility]

## 2018-01-05 NOTE — CARE COORDINATION
DC PLAN REMAINS HOME WITH SPOUSE. SHE DOES NOT WANT REHAB.   PATIENT MAY BE AGREEABLE TO HHC IF NEEDED

## 2018-01-06 PROCEDURE — 6360000002 HC RX W HCPCS: Performed by: INTERNAL MEDICINE

## 2018-01-06 PROCEDURE — 6370000000 HC RX 637 (ALT 250 FOR IP): Performed by: NURSE PRACTITIONER

## 2018-01-06 PROCEDURE — 2580000003 HC RX 258: Performed by: INTERNAL MEDICINE

## 2018-01-06 PROCEDURE — 2580000003 HC RX 258: Performed by: EMERGENCY MEDICINE

## 2018-01-06 PROCEDURE — 6360000002 HC RX W HCPCS: Performed by: NURSE PRACTITIONER

## 2018-01-06 PROCEDURE — 99232 SBSQ HOSP IP/OBS MODERATE 35: CPT | Performed by: INTERNAL MEDICINE

## 2018-01-06 PROCEDURE — 6370000000 HC RX 637 (ALT 250 FOR IP): Performed by: INTERNAL MEDICINE

## 2018-01-06 PROCEDURE — 93005 ELECTROCARDIOGRAM TRACING: CPT

## 2018-01-06 PROCEDURE — 51702 INSERT TEMP BLADDER CATH: CPT

## 2018-01-06 PROCEDURE — 97535 SELF CARE MNGMENT TRAINING: CPT

## 2018-01-06 PROCEDURE — 2060000000 HC ICU INTERMEDIATE R&B

## 2018-01-06 PROCEDURE — 2580000003 HC RX 258: Performed by: NURSE PRACTITIONER

## 2018-01-06 PROCEDURE — 2700000000 HC OXYGEN THERAPY PER DAY

## 2018-01-06 RX ORDER — METOPROLOL TARTRATE 50 MG/1
100 TABLET, FILM COATED ORAL 2 TIMES DAILY
Status: DISCONTINUED | OUTPATIENT
Start: 2018-01-06 | End: 2018-01-12 | Stop reason: HOSPADM

## 2018-01-06 RX ADMIN — SUCRALFATE 1 G: 1 TABLET ORAL at 17:51

## 2018-01-06 RX ADMIN — PRAMIPEXOLE DIHYDROCHLORIDE 0.5 MG: 0.5 TABLET ORAL at 08:51

## 2018-01-06 RX ADMIN — DRONABINOL 2.5 MG: 2.5 CAPSULE ORAL at 08:51

## 2018-01-06 RX ADMIN — METOPROLOL TARTRATE 50 MG: 50 TABLET ORAL at 07:53

## 2018-01-06 RX ADMIN — PRAMIPEXOLE DIHYDROCHLORIDE 0.5 MG: 0.5 TABLET ORAL at 23:01

## 2018-01-06 RX ADMIN — SODIUM CHLORIDE: 9 INJECTION, SOLUTION INTRAVENOUS at 23:04

## 2018-01-06 RX ADMIN — TOBRAMYCIN SULFATE 120 MG: 40 INJECTION, SOLUTION INTRAMUSCULAR; INTRAVENOUS at 23:11

## 2018-01-06 RX ADMIN — TOBRAMYCIN SULFATE 120 MG: 40 INJECTION, SOLUTION INTRAMUSCULAR; INTRAVENOUS at 07:57

## 2018-01-06 RX ADMIN — SUCRALFATE 1 G: 1 TABLET ORAL at 13:33

## 2018-01-06 RX ADMIN — LACTULOSE 10 G: 20 SOLUTION ORAL at 17:51

## 2018-01-06 RX ADMIN — METOPROLOL TARTRATE 100 MG: 50 TABLET ORAL at 23:02

## 2018-01-06 RX ADMIN — GABAPENTIN 300 MG: 300 CAPSULE ORAL at 07:53

## 2018-01-06 RX ADMIN — TRAMADOL HYDROCHLORIDE 25 MG: 50 TABLET, FILM COATED ORAL at 07:53

## 2018-01-06 RX ADMIN — DRONABINOL 2.5 MG: 2.5 CAPSULE ORAL at 17:51

## 2018-01-06 RX ADMIN — TIZANIDINE 4 MG: 4 TABLET ORAL at 23:02

## 2018-01-06 RX ADMIN — ASPIRIN 81 MG 81 MG: 81 TABLET ORAL at 07:53

## 2018-01-06 RX ADMIN — PRAMIPEXOLE DIHYDROCHLORIDE 0.5 MG: 0.5 TABLET ORAL at 13:40

## 2018-01-06 RX ADMIN — CARBIDOPA AND LEVODOPA 1 TABLET: 25; 100 TABLET ORAL at 13:33

## 2018-01-06 RX ADMIN — GABAPENTIN 300 MG: 300 CAPSULE ORAL at 23:01

## 2018-01-06 RX ADMIN — PANTOPRAZOLE SODIUM 40 MG: 40 TABLET, DELAYED RELEASE ORAL at 07:53

## 2018-01-06 RX ADMIN — CARBIDOPA AND LEVODOPA 1 TABLET: 25; 100 TABLET ORAL at 07:52

## 2018-01-06 RX ADMIN — GABAPENTIN 300 MG: 300 CAPSULE ORAL at 13:33

## 2018-01-06 RX ADMIN — Medication 10 ML: at 07:58

## 2018-01-06 RX ADMIN — TRAMADOL HYDROCHLORIDE 25 MG: 50 TABLET, FILM COATED ORAL at 23:00

## 2018-01-06 RX ADMIN — SUCRALFATE 1 G: 1 TABLET ORAL at 23:01

## 2018-01-06 RX ADMIN — BUSPIRONE HYDROCHLORIDE 10 MG: 10 TABLET ORAL at 23:03

## 2018-01-06 RX ADMIN — ROTIGOTINE 1 PATCH: 2 PATCH, EXTENDED RELEASE TRANSDERMAL at 08:51

## 2018-01-06 RX ADMIN — SUCRALFATE 1 G: 1 TABLET ORAL at 07:52

## 2018-01-06 RX ADMIN — Medication 4 MG: at 01:12

## 2018-01-06 RX ADMIN — PAROXETINE HYDROCHLORIDE 40 MG: 20 TABLET, FILM COATED ORAL at 07:53

## 2018-01-06 RX ADMIN — LEVOTHYROXINE SODIUM 50 MCG: 50 TABLET ORAL at 07:53

## 2018-01-06 RX ADMIN — ENOXAPARIN SODIUM 40 MG: 40 INJECTION, SOLUTION INTRAVENOUS; SUBCUTANEOUS at 07:54

## 2018-01-06 RX ADMIN — CARBIDOPA AND LEVODOPA 1 TABLET: 25; 100 TABLET ORAL at 23:03

## 2018-01-06 RX ADMIN — Medication 500 MG: at 23:01

## 2018-01-06 ASSESSMENT — PAIN SCALES - GENERAL
PAINLEVEL_OUTOF10: 0
PAINLEVEL_OUTOF10: 10
PAINLEVEL_OUTOF10: 5
PAINLEVEL_OUTOF10: 0
PAINLEVEL_OUTOF10: 5
PAINLEVEL_OUTOF10: 0

## 2018-01-06 ASSESSMENT — PAIN DESCRIPTION - LOCATION
LOCATION: FOOT;LEG
LOCATION: BACK

## 2018-01-06 ASSESSMENT — ENCOUNTER SYMPTOMS
SHORTNESS OF BREATH: 0
DIARRHEA: 0
VOMITING: 0
COUGH: 0

## 2018-01-06 ASSESSMENT — PAIN DESCRIPTION - ONSET: ONSET: AWAKENED FROM SLEEP

## 2018-01-06 ASSESSMENT — PAIN DESCRIPTION - FREQUENCY: FREQUENCY: INTERMITTENT

## 2018-01-06 ASSESSMENT — PAIN DESCRIPTION - PAIN TYPE
TYPE: CHRONIC PAIN
TYPE: CHRONIC PAIN

## 2018-01-06 ASSESSMENT — PAIN DESCRIPTION - DESCRIPTORS: DESCRIPTORS: SPASM

## 2018-01-06 ASSESSMENT — PAIN DESCRIPTION - ORIENTATION: ORIENTATION: RIGHT

## 2018-01-06 NOTE — PROGRESS NOTES
Objective:  Vital signs: (most recent): Blood pressure 127/63, pulse 69, temperature 96.9 °F (36.1 °C), temperature source Oral, resp. rate 20, height 5' 2\" (1.575 m), weight 140 lb 3.4 oz (63.6 kg), SpO2 100 %. Vital signs are normal.    Heart: Bradycardia. Regular rhythm. Assessment:  (Patient had one short episode of SVT around 9pm.  Has been in NSR or SB otherwise. ).        Barrera Deleon RN  1/6/2018

## 2018-01-06 NOTE — PROGRESS NOTES
0840    pramipexole (MIRAPEX) tablet 0.5 mg  0.5 mg Oral TID Yulissa Willinghamel, NP   0.5 mg at 01/05/18 2115    rotigotine (NEUPRO) 2 MG/24HR 1 patch  1 patch Transdermal Daily Yulissa Browning, NP   1 patch at 01/05/18 7660    sucralfate (CARAFATE) tablet 1 g  1 g Oral 4x Daily Yulissa Browning, NP   1 g at 01/05/18 2115    tiZANidine (ZANAFLEX) tablet 4 mg  4 mg Oral Nightly Yulissamissy Willinghamel, NP   4 mg at 01/05/18 2116    traMADol (ULTRAM) tablet 25 mg  25 mg Oral BID Yulissa Willinghamel, NP   25 mg at 01/05/18 2116    sodium chloride flush 0.9 % injection 10 mL  10 mL Intravenous 2 times per day Yulissa Willinghamel, NP   10 mL at 01/05/18 1150    sodium chloride flush 0.9 % injection 10 mL  10 mL Intravenous PRN Yulissa Nam, NP        acetaminophen (TYLENOL) tablet 650 mg  650 mg Oral Q4H PRN Yulissa Willinghamel, NP   650 mg at 01/04/18 1512    morphine injection 2 mg  2 mg Intravenous Q2H PRN Yulissa Willinghamel, NP   2 mg at 01/05/18 2011    Or    morphine injection 4 mg  4 mg Intravenous Q2H PRN Yulissa Willinghamel, NP   4 mg at 01/06/18 0112    magnesium hydroxide (MILK OF MAGNESIA) 400 MG/5ML suspension 30 mL  30 mL Oral Daily PRN Yulissa Willinghamel, NP        ondansetron (ZOFRAN) injection 4 mg  4 mg Intravenous Q6H PRN Yulissa Willinghamel, NP        enoxaparin (LOVENOX) injection 40 mg  40 mg Subcutaneous Daily Yulissa Willinghamel, NP   40 mg at 01/05/18 0840    nitroGLYCERIN (NITROSTAT) SL tablet 0.4 mg  0.4 mg Sublingual Q5 Min PRN Yulissa Willinghamel, NP         Allergies   Allergen Reactions    Latex     Penicillins     Vancomycin      Principal Problem:    Tachycardia  Active Problems:    PSVT (paroxysmal supraventricular tachycardia) (HCC)    Hypothyroidism    Parkinson disease (HCC)    Essential hypertension    Acute cystitis without hematuria    Blood pressure 127/63, pulse 69, temperature 96.9 °F (36.1 °C), temperature source Oral, resp.  rate 20, height 5' 2\" (1.575 m), weight 140 lb 3.4

## 2018-01-06 NOTE — FLOWSHEET NOTE
1855 pt from 1W to ICU 9 via bed. Slid over to bed. Vs obtained and pt settled in. Awaiting cardizem gtt. Pt alert, moaning, ouchy with hands on care. MP is SR 70s at this time. 1915 report to Jesus Alberto. Pt repositioned. mepliex applied  To coccyx. Pt had small BM, lisa care done.  Bed alarm on. _Electronically signed by Claude Seta, RN on 1/5/2018 at 7:55 PM

## 2018-01-06 NOTE — PROGRESS NOTES
Physical Therapy Med Surg Daily Treatment Note  Facility/Department: [facility]  [facility]       NAME: Yuko Cuellar  : 1940 (78 y.o.)  MRN: [id number]  CODE STATUS: Full Code    Date of Service: 2018    Patient Diagnosis(es): Tachycardia [R00.0]   Chief Complaint   Patient presents with    Chest Pain     cardiac monitor showed a run of 's yesterday, chest pain since yesterday     Patient Active Problem List    Diagnosis Date Noted    Tachycardia 2018     Priority: High    PSVT (paroxysmal supraventricular tachycardia) ([facility] 2017     Priority: High    Altered mental status      Priority: High    Acute cystitis without hematuria     Fibromyalgia 2017    Essential hypertension 2017    Parkinson disease ([facility] 2017    Hypothyroidism     Hyperglycemia 10/27/2017    Urinary retention 10/27/2017        Past Medical History:   Diagnosis Date    Depression     Fatigue     Fibromyalgia     Hyperlipidemia     Hypertension     Hypothyroid     Levodopa-induced dyskinesia     Lumbago     Muscular wasting and disuse atrophy     Myalgia     Osteoarthritis     Paralysis agitans ([facility]     Parkinson's disease ([facility]     Sepsis ([facility] 2017    Spinal stenosis     Thyroid disease     Urinary frequency      History reviewed. No pertinent surgical history. Restrictions  Restrictions/Precautions: Fall Risk    Subjective   General  Chart Reviewed: Yes  Family / Caregiver Present: Yes  Subjective  Subjective: pt states she is pooping right now  General Comment  Comments: agrees to tx  Pre Treatment Pain Screening  Pain at present: 0  Scale Used: Numeric Score    Pain Screening  Patient Currently in Pain: Denies     Pain Reassessment: 0   Orientation  Orientation  Overall Orientation Status: Within Functional Limits    Objective   Bed mobility  Supine to Sit: Moderate assistance  Sit to Supine:  Moderate assistance  Comment: pt sat eob and combed her

## 2018-01-06 NOTE — PROGRESS NOTES
Pt alert and oriented x3, vitals stable, off cardizem gtt since 0200 this am.  Pt takes pills whole with water, no swallowing issues seen. Pt can move all extremeities and change position with min to moderate assist.   Ok to transfer from dr. Nohemi Dewey and dr. Gerardo Romano standpoint. 3502: message sent to dr. Lara Danielson (covering for dr. Freddie Liao) re: transfer to [address] [place] [address]     0900: ofk to transfer from cardiology dr. Lara Danielson, dr. Nohemi Dewey and dr. Gregg Sahni. Transfer placed.     1330: called report to [address] [place] [address] pt for transport via bed to room 163

## 2018-01-07 LAB
ORGANISM: ABNORMAL
ORGANISM: ABNORMAL
URINE CULTURE, ROUTINE: ABNORMAL

## 2018-01-07 PROCEDURE — 6360000002 HC RX W HCPCS: Performed by: NURSE PRACTITIONER

## 2018-01-07 PROCEDURE — 6370000000 HC RX 637 (ALT 250 FOR IP): Performed by: NURSE PRACTITIONER

## 2018-01-07 PROCEDURE — 6360000002 HC RX W HCPCS: Performed by: INTERNAL MEDICINE

## 2018-01-07 PROCEDURE — 2580000003 HC RX 258: Performed by: EMERGENCY MEDICINE

## 2018-01-07 PROCEDURE — 99232 SBSQ HOSP IP/OBS MODERATE 35: CPT | Performed by: INTERNAL MEDICINE

## 2018-01-07 PROCEDURE — 6370000000 HC RX 637 (ALT 250 FOR IP): Performed by: INTERNAL MEDICINE

## 2018-01-07 PROCEDURE — 93005 ELECTROCARDIOGRAM TRACING: CPT

## 2018-01-07 PROCEDURE — 2700000000 HC OXYGEN THERAPY PER DAY

## 2018-01-07 PROCEDURE — 2060000000 HC ICU INTERMEDIATE R&B

## 2018-01-07 PROCEDURE — 2580000003 HC RX 258: Performed by: NURSE PRACTITIONER

## 2018-01-07 PROCEDURE — 2580000003 HC RX 258: Performed by: INTERNAL MEDICINE

## 2018-01-07 RX ORDER — SUCRALFATE ORAL 1 G/10ML
1 SUSPENSION ORAL 4 TIMES DAILY
Status: DISCONTINUED | OUTPATIENT
Start: 2018-01-07 | End: 2018-01-12 | Stop reason: HOSPADM

## 2018-01-07 RX ADMIN — TOBRAMYCIN SULFATE 120 MG: 40 INJECTION, SOLUTION INTRAMUSCULAR; INTRAVENOUS at 10:04

## 2018-01-07 RX ADMIN — PRAMIPEXOLE DIHYDROCHLORIDE 0.5 MG: 0.5 TABLET ORAL at 20:35

## 2018-01-07 RX ADMIN — GABAPENTIN 300 MG: 300 CAPSULE ORAL at 20:40

## 2018-01-07 RX ADMIN — CARBIDOPA AND LEVODOPA 1 TABLET: 25; 100 TABLET ORAL at 20:35

## 2018-01-07 RX ADMIN — SODIUM CHLORIDE: 9 INJECTION, SOLUTION INTRAVENOUS at 10:03

## 2018-01-07 RX ADMIN — DRONABINOL 2.5 MG: 2.5 CAPSULE ORAL at 10:09

## 2018-01-07 RX ADMIN — GABAPENTIN 300 MG: 300 CAPSULE ORAL at 10:05

## 2018-01-07 RX ADMIN — PANTOPRAZOLE SODIUM 40 MG: 40 TABLET, DELAYED RELEASE ORAL at 10:04

## 2018-01-07 RX ADMIN — BUSPIRONE HYDROCHLORIDE 10 MG: 10 TABLET ORAL at 20:35

## 2018-01-07 RX ADMIN — ENOXAPARIN SODIUM 40 MG: 40 INJECTION, SOLUTION INTRAVENOUS; SUBCUTANEOUS at 10:03

## 2018-01-07 RX ADMIN — SUCRALFATE 1 G: 1 SUSPENSION ORAL at 17:25

## 2018-01-07 RX ADMIN — PRAMIPEXOLE DIHYDROCHLORIDE 0.5 MG: 0.5 TABLET ORAL at 10:04

## 2018-01-07 RX ADMIN — TIZANIDINE 4 MG: 4 TABLET ORAL at 20:40

## 2018-01-07 RX ADMIN — ROTIGOTINE 1 PATCH: 2 PATCH, EXTENDED RELEASE TRANSDERMAL at 10:03

## 2018-01-07 RX ADMIN — GABAPENTIN 300 MG: 300 CAPSULE ORAL at 14:59

## 2018-01-07 RX ADMIN — PRAMIPEXOLE DIHYDROCHLORIDE 0.5 MG: 0.5 TABLET ORAL at 14:59

## 2018-01-07 RX ADMIN — LEVOTHYROXINE SODIUM 50 MCG: 50 TABLET ORAL at 10:06

## 2018-01-07 RX ADMIN — SUCRALFATE 1 G: 1 SUSPENSION ORAL at 20:41

## 2018-01-07 RX ADMIN — Medication 500 MG: at 20:40

## 2018-01-07 RX ADMIN — CARBIDOPA AND LEVODOPA 1 TABLET: 25; 100 TABLET ORAL at 14:59

## 2018-01-07 RX ADMIN — Medication 10 ML: at 20:47

## 2018-01-07 RX ADMIN — TRAMADOL HYDROCHLORIDE 25 MG: 50 TABLET, FILM COATED ORAL at 10:06

## 2018-01-07 RX ADMIN — TOBRAMYCIN SULFATE 120 MG: 40 INJECTION, SOLUTION INTRAMUSCULAR; INTRAVENOUS at 20:56

## 2018-01-07 RX ADMIN — CARBIDOPA AND LEVODOPA 1 TABLET: 25; 100 TABLET ORAL at 10:05

## 2018-01-07 RX ADMIN — PAROXETINE HYDROCHLORIDE 40 MG: 20 TABLET, FILM COATED ORAL at 10:05

## 2018-01-07 RX ADMIN — METOPROLOL TARTRATE 100 MG: 50 TABLET ORAL at 20:40

## 2018-01-07 RX ADMIN — TRAMADOL HYDROCHLORIDE 25 MG: 50 TABLET, FILM COATED ORAL at 20:44

## 2018-01-07 RX ADMIN — ASPIRIN 81 MG 81 MG: 81 TABLET ORAL at 10:05

## 2018-01-07 RX ADMIN — METOPROLOL TARTRATE 100 MG: 50 TABLET ORAL at 10:05

## 2018-01-07 RX ADMIN — LACTULOSE 10 G: 20 SOLUTION ORAL at 17:25

## 2018-01-07 ASSESSMENT — PAIN SCALES - GENERAL
PAINLEVEL_OUTOF10: 0
PAINLEVEL_OUTOF10: 0
PAINLEVEL_OUTOF10: 5
PAINLEVEL_OUTOF10: 0

## 2018-01-07 NOTE — PROGRESS NOTES
SpO2 96 %. Subjective:  Symptoms:  No shortness of breath, malaise, cough, chest pain, weakness, headache, chest pressure, anorexia, diarrhea or anxiety. Diet:  No vomiting. Objective:  General Appearance:  Comfortable, well-appearing and in no acute distress. Vital signs: (most recent): Blood pressure 127/63, pulse 69, temperature 96.9 °F (36.1 °C), temperature source Oral, resp. rate 20, height 5' 2\" (1.575 m), weight 140 lb 3.4 oz (63.6 kg), SpO2 100 %. HEENT: Normal HEENT exam.    Heart: Normal rate. Positive for murmur. Abdomen: Bowel sounds are normal.     Pulses: Distal pulses are intact. Neurological: Patient is alert. Pupils:  Pupils are equal, round, and reactive to light. Skin:  Warm and dry.       Past Medical History:   Diagnosis Date    Depression     Fatigue     Fibromyalgia     Hyperlipidemia     Hypertension     Hypothyroid     Levodopa-induced dyskinesia     Lumbago     Muscular wasting and disuse atrophy     Myalgia     Osteoarthritis     Paralysis agitans ([address]     Parkinson's disease ([address]     Sepsis ([address] 11/24/2017    Spinal stenosis     Thyroid disease     Urinary frequency        Assessment & Plan  1) sVT resolved in sinsu  2) UTI/cystitis    C/w ABX  3) HTN not controlled  Before meds  monitor  4) hypothyroidism c/w synthroid  5) parkinson dz  Stable  Pt/ot  Edwin Lyle MD  1/7/2018

## 2018-01-08 PROCEDURE — 2700000000 HC OXYGEN THERAPY PER DAY

## 2018-01-08 PROCEDURE — 6370000000 HC RX 637 (ALT 250 FOR IP): Performed by: INTERNAL MEDICINE

## 2018-01-08 PROCEDURE — 97110 THERAPEUTIC EXERCISES: CPT

## 2018-01-08 PROCEDURE — 6370000000 HC RX 637 (ALT 250 FOR IP): Performed by: NURSE PRACTITIONER

## 2018-01-08 PROCEDURE — 6360000002 HC RX W HCPCS: Performed by: INTERNAL MEDICINE

## 2018-01-08 PROCEDURE — 93005 ELECTROCARDIOGRAM TRACING: CPT

## 2018-01-08 PROCEDURE — 2580000003 HC RX 258: Performed by: EMERGENCY MEDICINE

## 2018-01-08 PROCEDURE — 6360000002 HC RX W HCPCS: Performed by: NURSE PRACTITIONER

## 2018-01-08 PROCEDURE — 93010 ELECTROCARDIOGRAM REPORT: CPT | Performed by: INTERNAL MEDICINE

## 2018-01-08 PROCEDURE — G8988 SELF CARE GOAL STATUS: HCPCS

## 2018-01-08 PROCEDURE — 97167 OT EVAL HIGH COMPLEX 60 MIN: CPT

## 2018-01-08 PROCEDURE — 99232 SBSQ HOSP IP/OBS MODERATE 35: CPT | Performed by: INTERNAL MEDICINE

## 2018-01-08 PROCEDURE — 97535 SELF CARE MNGMENT TRAINING: CPT

## 2018-01-08 PROCEDURE — 2580000003 HC RX 258: Performed by: INTERNAL MEDICINE

## 2018-01-08 PROCEDURE — 2060000000 HC ICU INTERMEDIATE R&B

## 2018-01-08 PROCEDURE — G8987 SELF CARE CURRENT STATUS: HCPCS

## 2018-01-08 RX ORDER — HYDRALAZINE HYDROCHLORIDE 20 MG/ML
10 INJECTION INTRAMUSCULAR; INTRAVENOUS EVERY 4 HOURS PRN
Status: DISCONTINUED | OUTPATIENT
Start: 2018-01-08 | End: 2018-01-12 | Stop reason: HOSPADM

## 2018-01-08 RX ORDER — AMIODARONE HYDROCHLORIDE 200 MG/1
200 TABLET ORAL 3 TIMES DAILY
Status: DISCONTINUED | OUTPATIENT
Start: 2018-01-08 | End: 2018-01-10

## 2018-01-08 RX ADMIN — TRAMADOL HYDROCHLORIDE 25 MG: 50 TABLET, FILM COATED ORAL at 08:48

## 2018-01-08 RX ADMIN — PRAMIPEXOLE DIHYDROCHLORIDE 0.5 MG: 0.5 TABLET ORAL at 17:58

## 2018-01-08 RX ADMIN — SUCRALFATE 1 G: 1 SUSPENSION ORAL at 17:57

## 2018-01-08 RX ADMIN — TIZANIDINE 4 MG: 4 TABLET ORAL at 20:35

## 2018-01-08 RX ADMIN — Medication 500 MG: at 20:35

## 2018-01-08 RX ADMIN — GABAPENTIN 300 MG: 300 CAPSULE ORAL at 08:47

## 2018-01-08 RX ADMIN — ROTIGOTINE 1 PATCH: 2 PATCH, EXTENDED RELEASE TRANSDERMAL at 08:48

## 2018-01-08 RX ADMIN — PAROXETINE HYDROCHLORIDE 40 MG: 20 TABLET, FILM COATED ORAL at 08:48

## 2018-01-08 RX ADMIN — ASPIRIN 81 MG 81 MG: 81 TABLET ORAL at 08:47

## 2018-01-08 RX ADMIN — SUCRALFATE 1 G: 1 SUSPENSION ORAL at 08:48

## 2018-01-08 RX ADMIN — TOBRAMYCIN SULFATE 120 MG: 40 INJECTION, SOLUTION INTRAMUSCULAR; INTRAVENOUS at 10:00

## 2018-01-08 RX ADMIN — GABAPENTIN 300 MG: 300 CAPSULE ORAL at 20:35

## 2018-01-08 RX ADMIN — METOPROLOL TARTRATE 100 MG: 50 TABLET ORAL at 08:47

## 2018-01-08 RX ADMIN — CARBIDOPA AND LEVODOPA 1 TABLET: 25; 100 TABLET ORAL at 17:58

## 2018-01-08 RX ADMIN — CARBIDOPA AND LEVODOPA 1 TABLET: 25; 100 TABLET ORAL at 20:35

## 2018-01-08 RX ADMIN — CARBIDOPA AND LEVODOPA 1 TABLET: 25; 100 TABLET ORAL at 08:47

## 2018-01-08 RX ADMIN — GABAPENTIN 300 MG: 300 CAPSULE ORAL at 17:58

## 2018-01-08 RX ADMIN — Medication 500 MG: at 08:47

## 2018-01-08 RX ADMIN — LEVOTHYROXINE SODIUM 50 MCG: 50 TABLET ORAL at 06:52

## 2018-01-08 RX ADMIN — PRAMIPEXOLE DIHYDROCHLORIDE 0.5 MG: 0.5 TABLET ORAL at 08:47

## 2018-01-08 RX ADMIN — ENOXAPARIN SODIUM 40 MG: 40 INJECTION, SOLUTION INTRAVENOUS; SUBCUTANEOUS at 08:48

## 2018-01-08 RX ADMIN — PANTOPRAZOLE SODIUM 40 MG: 40 TABLET, DELAYED RELEASE ORAL at 06:52

## 2018-01-08 RX ADMIN — BUSPIRONE HYDROCHLORIDE 10 MG: 10 TABLET ORAL at 20:35

## 2018-01-08 RX ADMIN — PRAMIPEXOLE DIHYDROCHLORIDE 0.5 MG: 0.5 TABLET ORAL at 20:35

## 2018-01-08 RX ADMIN — DRONABINOL 2.5 MG: 2.5 CAPSULE ORAL at 06:53

## 2018-01-08 RX ADMIN — TRAMADOL HYDROCHLORIDE 25 MG: 50 TABLET, FILM COATED ORAL at 20:35

## 2018-01-08 RX ADMIN — AMIODARONE HYDROCHLORIDE 200 MG: 200 TABLET ORAL at 17:58

## 2018-01-08 RX ADMIN — SODIUM CHLORIDE: 9 INJECTION, SOLUTION INTRAVENOUS at 20:29

## 2018-01-08 RX ADMIN — SODIUM CHLORIDE: 9 INJECTION, SOLUTION INTRAVENOUS at 06:52

## 2018-01-08 RX ADMIN — LACTULOSE 10 G: 20 SOLUTION ORAL at 17:57

## 2018-01-08 RX ADMIN — TOBRAMYCIN SULFATE 120 MG: 40 INJECTION, SOLUTION INTRAMUSCULAR; INTRAVENOUS at 21:33

## 2018-01-08 RX ADMIN — AMIODARONE HYDROCHLORIDE 200 MG: 200 TABLET ORAL at 20:35

## 2018-01-08 RX ADMIN — METOPROLOL TARTRATE 100 MG: 50 TABLET ORAL at 20:35

## 2018-01-08 RX ADMIN — AMIODARONE HYDROCHLORIDE 200 MG: 200 TABLET ORAL at 08:47

## 2018-01-08 ASSESSMENT — PAIN SCALES - WONG BAKER
WONGBAKER_NUMERICALRESPONSE: 6
WONGBAKER_NUMERICALRESPONSE: 6

## 2018-01-08 ASSESSMENT — PAIN SCALES - GENERAL
PAINLEVEL_OUTOF10: 2
PAINLEVEL_OUTOF10: 4
PAINLEVEL_OUTOF10: 5

## 2018-01-08 ASSESSMENT — PAIN DESCRIPTION - PROGRESSION
CLINICAL_PROGRESSION: NOT CHANGED
CLINICAL_PROGRESSION: NOT CHANGED

## 2018-01-08 ASSESSMENT — PAIN DESCRIPTION - PAIN TYPE: TYPE: CHRONIC PAIN

## 2018-01-08 ASSESSMENT — PAIN DESCRIPTION - LOCATION: LOCATION: FOOT;KNEE

## 2018-01-08 ASSESSMENT — PAIN DESCRIPTION - ORIENTATION: ORIENTATION: RIGHT;LEFT

## 2018-01-08 NOTE — PROGRESS NOTES
Impaired  Comments:    Sensation:   [x] WFL   []  Impaired   Comments:      Cognition:   [x] WFL   [] Impaired  Comments:  Pt following one step commands consistently  Communication:   [x] WFL   [] Impaired  Comments:Divehi speaking  Hand Dominance: [x] Right   [] Left    Range of Motion:  R UE AROM/PROM: []  WFL [x] Impaired  Comments: shoulder flexion  L UE AROM/PROM:  []  WFL [x] Impaired  Comments: shoulder flexion    Strength:   R UE Strength: []1    [] 2   [] 2+   [] 3   [x] 3+   [] 4   [] 4+  [] 5  Comments: except shoulder  L UE Strength:  []1    [] 2   [] 2+   [] 3   [x] 3+  [] 4   [] 4+   [] 5  Comments: except shoulder    Quality of Movement:  [] Good   [x] Fair   [] Poor     Coordination:  Gross motor: [x] WFL   [] Impaired   Fine motor: [x] WFL   [] Impaired     Functional Mobility:  Toilet Transfers:  Dependent  Bed Transfer:  Dependent  Sit to stand: dependent  Bed to Chair:  dependent    Seated Balance:    Pt refused  Static: [] Good  [] Fair   [] Poor   Dynamic: []  Good  [] Fair   [] Poor     Standing Balance:   Pt refused  Static: [] Good   [] Fair  [] Poor   Dynamic: [] Good   [] Fair   [] Poor     Functional Endurance: [] Good  [] Fair  [x] Poor     ADLs  Feeding:   Positive hand to mouth  UE Dressing:  Max A  LB Dressing:  Dependent  Bathing:  Max A  Toileting: Max A  Grooming:  Mod A    Treatment Plan will consist of:   [x] ADL Training   [x] Strengthening   [x] Endurance   [x] Transfer Training   []  DME ed      [] HEP  [] Manual Therapy   [x] AROM/PROM    [] Coordination   [] Cognitive Training   []Safety training   [] Other :    Goals:   Patient will:   [x]  Improve functional endurance to tolerate/complete 5-10 mins of ADL's   [x]  Be Min A in UB ADLs    [x]  Be Mod A in LB ADLs   [x]  Be Mod A in ADL transfers without LOB   [x]  Be Min A in toileting tasks   []  Improve   hand fine motor coordination to   in order to manage clothing fasteners/self-care containers in a timely manner   [] Improve   UE Function (AROM, strength, motor control, tone normalization) to complete ADLs as projected. [x]  Improve bilateral UE strength and endurance to Fair in order to participate in self-care activities as projected. [x]  Access appropriate D/C site with as few architectural barriers as possible.   []  Sequence self-care tasks with     []  Other :       Patient Goal: To return to home with support  Discussed and agreed upon: [x] Yes   [] No         Comments:     Assessment/Discharge Disposition:     Performance deficits / Impairments: Decreased functional mobility , Decreased ADL status, Decreased balance, Decreased endurance, Decreased strength  Prognosis: Fair  Discharge Recommendations: Continue to assess pending progress  History: multiple  Exam: greater than 5  Assistance / Modification: max A    Prognosis:  [] Good   [x]Fair   [] Poor     Barriers to Improvement:  Tolerance,balance,strength    Recommended DME:  [] W/W   [] Cathi Alvares   [] Rollator   [] W/C   [] Jethro Hodge  [] Shower Chair   []Dressing AD []  BSC  [] Other:    Plan:Times per week: 1-3x per week,      G-Codes:  OT G-codes  Functional Assessment Tool Used: clinical obs  Functional Limitation: Self care  Self Care Current Status (): At least 40 percent but less than 60 percent impaired, limited or restricted  Self Care Goal Status ():  At least 20 percent but less than 40 percent impaired, limited or restricted    Time in:  1345  Time out:  1410  Timed treatment minutes:  25  Total treatment time/minutes:  25    Electronically signed by:    Radha Williamson OT  6/5/2959, 4:49 PM Electronically signed by SAM Ramirez/L on 1/4/15 at 2:24 PM

## 2018-01-08 NOTE — PROGRESS NOTES
Department of Internal Medicine   Progress Note      SUBJECTIVE:    []Hide copied text  Gaurav Melendez is a 78 y.o. female patient. pt was seen and evaluated, no new complaints,no overnight events         OBJECTIVE      Medications    Current Facility-Administered Medications: amiodarone (CORDARONE) tablet 200 mg, 200 mg, Oral, TID  hydrALAZINE (APRESOLINE) injection 10 mg, 10 mg, Intravenous, Q4H PRN  sucralfate (CARAFATE) 1 GM/10ML suspension 1 g, 1 g, Oral, 4x Daily  metoprolol tartrate (LOPRESSOR) tablet 100 mg, 100 mg, Oral, BID  tobramycin (NEBCIN) 120 mg in dextrose 5 % 100 mL IVPB, 120 mg, Intravenous, Q12H  0.9 % sodium chloride infusion, , Intravenous, Continuous  albuterol sulfate  (90 Base) MCG/ACT inhaler 2 puff, 2 puff, Inhalation, Q6H PRN  aspirin chewable tablet 81 mg, 81 mg, Oral, Daily  busPIRone (BUSPAR) tablet 10 mg, 10 mg, Oral, Nightly  calcium elemental (OSCAL) tablet 500 mg, 500 mg, Oral, BID  carbidopa-levodopa (SINEMET)  MG per tablet 1 tablet, 1 tablet, Oral, TID  dronabinol (MARINOL) capsule 2.5 mg, 2.5 mg, Oral, BID AC  gabapentin (NEURONTIN) capsule 300 mg, 300 mg, Oral, TID  lactulose (CHRONULAC) 10 GM/15ML solution 10 g, 10 g, Oral, QPM  levothyroxine (SYNTHROID) tablet 50 mcg, 50 mcg, Oral, Daily  pantoprazole (PROTONIX) tablet 40 mg, 40 mg, Oral, QAM AC  PARoxetine (PAXIL) tablet 40 mg, 40 mg, Oral, QAM  pramipexole (MIRAPEX) tablet 0.5 mg, 0.5 mg, Oral, TID  rotigotine (NEUPRO) 2 MG/24HR 1 patch, 1 patch, Transdermal, Daily  tiZANidine (ZANAFLEX) tablet 4 mg, 4 mg, Oral, Nightly  traMADol (ULTRAM) tablet 25 mg, 25 mg, Oral, BID  sodium chloride flush 0.9 % injection 10 mL, 10 mL, Intravenous, 2 times per day  sodium chloride flush 0.9 % injection 10 mL, 10 mL, Intravenous, PRN  acetaminophen (TYLENOL) tablet 650 mg, 650 mg, Oral, Q4H PRN  morphine injection 2 mg, 2 mg, Intravenous, Q2H PRN **OR** morphine injection 4 mg, 4 mg, Intravenous, Q2H PRN  magnesium hydroxide (MILK OF MAGNESIA) 400 MG/5ML suspension 30 mL, 30 mL, Oral, Daily PRN  ondansetron (ZOFRAN) injection 4 mg, 4 mg, Intravenous, Q6H PRN  enoxaparin (LOVENOX) injection 40 mg, 40 mg, Subcutaneous, Daily  nitroGLYCERIN (NITROSTAT) SL tablet 0.4 mg, 0.4 mg, Sublingual, Q5 Min PRN  Physical    VITALS:  BP (!) 174/83   Pulse 71   Temp 98.1 °F (36.7 °C)   Resp 20   Ht 5' 2\" (1.575 m)   Wt 142 lb 6.7 oz (64.6 kg)   SpO2 97%   BMI 26.05 kg/m²   HEENT: Normal HEENT exam.    Heart: Normal rate. Positive for murmur. Abdomen: Bowel sounds are normal.     Pulses: Distal pulses are intact. Neurological: Patient is alert. Pupils:  Pupils are equal, round, and reactive to light. Skin:  Warm and dry. Data    LABS  No results for input(s): WBC, RBC, HGB, HCT, MCV, MCH, MCHC, RDW, PLT, MPV in the last 72 hours. No results for input(s): NA, K, CL, CO2, BUN, CREATININE, GLUCOSE, CALCIUM in the last 72 hours. No results for input(s): MG in the last 72 hours. ASSESSMENT AND PLAN      Active Hospital Problems    Diagnosis Date Noted    Tachycardia [R00.0] 01/02/2018     Priority: High    PSVT (paroxysmal supraventricular tachycardia) (HCC) [I47.1] 11/01/2017     Priority: High    Acute cystitis without hematuria [N30.00]     Essential hypertension [I10] 12/27/2017    Parkinson disease ([facility] Celestino Pancoast 11/24/2017    Hypothyroidism [E03.9]      SVT: resolved  - UTI: on tobramycin.  As per ID recs  - HTN: continue meds  - Hypothyroid: Jhonatan Thao MD  Pager : [phone]

## 2018-01-08 NOTE — PROGRESS NOTES
Physical Therapy Med Surg Daily Treatment Note  [facility]Department[facility]  [id number]       NAME: Leidy Costello  : 1940 (78 y.o.)  MRN: [id number]  CODE STATUS: Full Code    Date of Service: 2018    Patient Diagnosis(es): Tachycardia [R00.0]   Chief Complaint   Patient presents with    Chest Pain     cardiac monitor showed a run of 's yesterday, chest pain since yesterday     Patient Active Problem List    Diagnosis Date Noted    Tachycardia 2018     Priority: High    PSVT (paroxysmal supraventricular tachycardia) ([address] 2017     Priority: High    Altered mental status      Priority: High    Acute cystitis without hematuria     Fibromyalgia 2017    Essential hypertension 2017    Parkinson disease ([address] 2017    Hypothyroidism     Hyperglycemia 10/27/2017    Urinary retention 10/27/2017        Past Medical History:   Diagnosis Date    Depression     Fatigue     Fibromyalgia     Hyperlipidemia     Hypertension     Hypothyroid     Levodopa-induced dyskinesia     Lumbago     Muscular wasting and disuse atrophy     Myalgia     Osteoarthritis     Paralysis agitans ([address]     Parkinson's disease ([address]     Sepsis ([address] 2017    Spinal stenosis     Thyroid disease     Urinary frequency      History reviewed. No pertinent surgical history.       Restrictions  Restrictions/Precautions: Fall Risk    Subjective   General  Chart Reviewed: Yes  Family / Caregiver Present: Yes  Subjective  Subjective: Pt's family present  General Comment  Comments: agrees to tx       Pain Screening  Patient Currently in Pain: Yes     Pain Reassessment: 5/10, nsg. Present and aware  Pain Assessment  Pain Level: 2  Pain Type: Chronic pain  Pain Location: Foot;Knee  Pain Orientation: Right;Left  Pain Descriptors:  (hurt)       Orientation       Objective   Bed mobility  Bridging:  (cues)  Rolling to Left: Supervision;Stand by assistance (cueing)  Rolling to Training, Patient/Caregiver Education & Training  Safety Devices  Type of devices:  All fall risk precautions in place     Therapy Time   Individual   Time In 1310   Time Out 1340   Minutes 30      Bed mobility x 20  TE x 10       Karie Macdonald PTA, 01/08/18 at 1:56 PM

## 2018-01-08 NOTE — PROGRESS NOTES
Tachycardia            PLAN:     UTI CULT Pseudomonas aeruginosa And Klebsiella    TOBRAMYCIN day 4 Slow improvement plan for 2-3 more days

## 2018-01-08 NOTE — PROGRESS NOTES
Murmur heard. Pulmonary/Chest: Effort normal and breath sounds normal. She has no wheezes. She has no rales. She exhibits no tenderness. Abdominal: Soft. Bowel sounds are normal. There is no tenderness. Musculoskeletal: Normal range of motion. She exhibits no edema or tenderness. Neurological: She is alert. She exhibits a cognitive deficit. Skin: Skin is warm. No cyanosis. Nails show no clubbing.        LABS:  CBC:   Lab Results   Component Value Date    WBC 5.2 01/03/2018    RBC 3.45 01/03/2018    HGB 11.0 01/03/2018    HCT 32.7 01/03/2018    MCV 94.8 01/03/2018    MCH 31.9 01/03/2018    MCHC 33.7 01/03/2018    RDW 15.7 01/03/2018     01/03/2018    MPV 9.4 09/22/2015     CBC with Differential:    Lab Results   Component Value Date    WBC 5.2 01/03/2018    RBC 3.45 01/03/2018    HGB 11.0 01/03/2018    HCT 32.7 01/03/2018     01/03/2018    MCV 94.8 01/03/2018    MCH 31.9 01/03/2018    MCHC 33.7 01/03/2018    RDW 15.7 01/03/2018    LYMPHOPCT 17.9 01/02/2018    MONOPCT 8.8 01/02/2018    BASOPCT 1.1 01/02/2018    MONOSABS 0.6 01/02/2018    LYMPHSABS 1.2 01/02/2018    EOSABS 0.1 01/02/2018    BASOSABS 0.1 01/02/2018     CMP:    Lab Results   Component Value Date     01/03/2018    K 4.1 01/03/2018     01/03/2018    CO2 28 01/03/2018    BUN 9 01/03/2018    CREATININE 0.62 01/03/2018    GFRAA >60.0 01/03/2018    LABGLOM >60.0 01/03/2018    GLUCOSE 86 01/03/2018    PROT 7.4 01/02/2018    LABALBU 4.2 01/02/2018    CALCIUM 8.8 01/03/2018    BILITOT 0.3 01/02/2018    ALKPHOS 92 01/02/2018    AST 19 01/02/2018    ALT 6 01/02/2018     BMP:    Lab Results   Component Value Date     01/03/2018    K 4.1 01/03/2018     01/03/2018    CO2 28 01/03/2018    BUN 9 01/03/2018    LABALBU 4.2 01/02/2018    CREATININE 0.62 01/03/2018    CALCIUM 8.8 01/03/2018    GFRAA >60.0 01/03/2018    LABGLOM >60.0 01/03/2018    GLUCOSE 86 01/03/2018     Magnesium:    Lab Results   Component Value Date    MG

## 2018-01-08 NOTE — PROGRESS NOTES
Blue phone used for interpreting today, patient told me that her left foot is hurting her, ultram given for pain. Pt. Is sleeping , respirations unlabored, vitals stable. Patient turned herself on the left side on her own.  Electronically signed by Adrián Chahal RN on 1/7/2018 at 11:19 PM

## 2018-01-09 LAB
ANION GAP SERPL CALCULATED.3IONS-SCNC: 14 MEQ/L (ref 7–13)
BUN BLDV-MCNC: 4 MG/DL (ref 8–23)
CALCIUM SERPL-MCNC: 9.2 MG/DL (ref 8.6–10.2)
CHLORIDE BLD-SCNC: 101 MEQ/L (ref 98–107)
CO2: 28 MEQ/L (ref 22–29)
CREAT SERPL-MCNC: 0.6 MG/DL (ref 0.5–0.9)
GFR AFRICAN AMERICAN: >60
GFR NON-AFRICAN AMERICAN: >60
GLUCOSE BLD-MCNC: 139 MG/DL (ref 74–109)
HCT VFR BLD CALC: 38.7 % (ref 37–47)
HEMOGLOBIN: 12.7 G/DL (ref 12–16)
MCH RBC QN AUTO: 31.4 PG (ref 27–31.3)
MCHC RBC AUTO-ENTMCNC: 32.8 % (ref 33–37)
MCV RBC AUTO: 95.6 FL (ref 82–100)
PDW BLD-RTO: 15.5 % (ref 11.5–14.5)
PLATELET # BLD: 156 K/UL (ref 130–400)
POTASSIUM SERPL-SCNC: 3.3 MEQ/L (ref 3.5–5.1)
RBC # BLD: 4.05 M/UL (ref 4.2–5.4)
SODIUM BLD-SCNC: 143 MEQ/L (ref 132–144)
WBC # BLD: 6.2 K/UL (ref 4.8–10.8)

## 2018-01-09 PROCEDURE — 6360000002 HC RX W HCPCS: Performed by: INTERNAL MEDICINE

## 2018-01-09 PROCEDURE — 36415 COLL VENOUS BLD VENIPUNCTURE: CPT

## 2018-01-09 PROCEDURE — 6370000000 HC RX 637 (ALT 250 FOR IP): Performed by: INTERNAL MEDICINE

## 2018-01-09 PROCEDURE — 80048 BASIC METABOLIC PNL TOTAL CA: CPT

## 2018-01-09 PROCEDURE — 6360000002 HC RX W HCPCS: Performed by: NURSE PRACTITIONER

## 2018-01-09 PROCEDURE — 2500000003 HC RX 250 WO HCPCS: Performed by: INTERNAL MEDICINE

## 2018-01-09 PROCEDURE — 85027 COMPLETE CBC AUTOMATED: CPT

## 2018-01-09 PROCEDURE — 2580000003 HC RX 258: Performed by: INTERNAL MEDICINE

## 2018-01-09 PROCEDURE — 6370000000 HC RX 637 (ALT 250 FOR IP): Performed by: NURSE PRACTITIONER

## 2018-01-09 PROCEDURE — 2060000000 HC ICU INTERMEDIATE R&B

## 2018-01-09 PROCEDURE — 2700000000 HC OXYGEN THERAPY PER DAY

## 2018-01-09 PROCEDURE — 2580000003 HC RX 258: Performed by: EMERGENCY MEDICINE

## 2018-01-09 PROCEDURE — 2580000003 HC RX 258: Performed by: NURSE PRACTITIONER

## 2018-01-09 PROCEDURE — 99232 SBSQ HOSP IP/OBS MODERATE 35: CPT | Performed by: INTERNAL MEDICINE

## 2018-01-09 RX ORDER — LISINOPRIL 5 MG/1
5 TABLET ORAL DAILY
Status: DISCONTINUED | OUTPATIENT
Start: 2018-01-09 | End: 2018-01-12 | Stop reason: HOSPADM

## 2018-01-09 RX ADMIN — LACTULOSE 10 G: 20 SOLUTION ORAL at 21:07

## 2018-01-09 RX ADMIN — ASPIRIN 81 MG 81 MG: 81 TABLET ORAL at 10:16

## 2018-01-09 RX ADMIN — GABAPENTIN 300 MG: 300 CAPSULE ORAL at 15:31

## 2018-01-09 RX ADMIN — SUCRALFATE 1 G: 1 SUSPENSION ORAL at 21:07

## 2018-01-09 RX ADMIN — LISINOPRIL 5 MG: 5 TABLET ORAL at 10:20

## 2018-01-09 RX ADMIN — CARBIDOPA AND LEVODOPA 1 TABLET: 25; 100 TABLET ORAL at 15:31

## 2018-01-09 RX ADMIN — BUSPIRONE HYDROCHLORIDE 10 MG: 10 TABLET ORAL at 21:05

## 2018-01-09 RX ADMIN — PRAMIPEXOLE DIHYDROCHLORIDE 0.5 MG: 0.5 TABLET ORAL at 21:04

## 2018-01-09 RX ADMIN — MICONAZOLE NITRATE: 20 POWDER TOPICAL at 15:40

## 2018-01-09 RX ADMIN — PRAMIPEXOLE DIHYDROCHLORIDE 0.5 MG: 0.5 TABLET ORAL at 15:31

## 2018-01-09 RX ADMIN — TRAMADOL HYDROCHLORIDE 25 MG: 50 TABLET, FILM COATED ORAL at 10:17

## 2018-01-09 RX ADMIN — METOPROLOL TARTRATE 100 MG: 50 TABLET ORAL at 10:19

## 2018-01-09 RX ADMIN — DRONABINOL 2.5 MG: 2.5 CAPSULE ORAL at 15:32

## 2018-01-09 RX ADMIN — CARBIDOPA AND LEVODOPA 1 TABLET: 25; 100 TABLET ORAL at 21:04

## 2018-01-09 RX ADMIN — SUCRALFATE 1 G: 1 SUSPENSION ORAL at 14:00

## 2018-01-09 RX ADMIN — AMIODARONE HYDROCHLORIDE 200 MG: 200 TABLET ORAL at 21:05

## 2018-01-09 RX ADMIN — TIZANIDINE 4 MG: 4 TABLET ORAL at 21:05

## 2018-01-09 RX ADMIN — SUCRALFATE 1 G: 1 SUSPENSION ORAL at 10:18

## 2018-01-09 RX ADMIN — AMIODARONE HYDROCHLORIDE 200 MG: 200 TABLET ORAL at 15:31

## 2018-01-09 RX ADMIN — HYDRALAZINE HYDROCHLORIDE 10 MG: 20 INJECTION INTRAMUSCULAR; INTRAVENOUS at 05:54

## 2018-01-09 RX ADMIN — ROTIGOTINE 1 PATCH: 2 PATCH, EXTENDED RELEASE TRANSDERMAL at 10:19

## 2018-01-09 RX ADMIN — TRAMADOL HYDROCHLORIDE 25 MG: 50 TABLET, FILM COATED ORAL at 21:05

## 2018-01-09 RX ADMIN — Medication 10 ML: at 09:00

## 2018-01-09 RX ADMIN — CARBIDOPA AND LEVODOPA 1 TABLET: 25; 100 TABLET ORAL at 10:19

## 2018-01-09 RX ADMIN — AMIODARONE HYDROCHLORIDE 200 MG: 200 TABLET ORAL at 10:16

## 2018-01-09 RX ADMIN — SODIUM CHLORIDE: 9 INJECTION, SOLUTION INTRAVENOUS at 05:48

## 2018-01-09 RX ADMIN — MICONAZOLE NITRATE: 20 POWDER TOPICAL at 21:00

## 2018-01-09 RX ADMIN — METOPROLOL TARTRATE 100 MG: 50 TABLET ORAL at 21:05

## 2018-01-09 RX ADMIN — PANTOPRAZOLE SODIUM 40 MG: 40 TABLET, DELAYED RELEASE ORAL at 05:48

## 2018-01-09 RX ADMIN — GABAPENTIN 300 MG: 300 CAPSULE ORAL at 21:05

## 2018-01-09 RX ADMIN — Medication 500 MG: at 10:19

## 2018-01-09 RX ADMIN — LEVOTHYROXINE SODIUM 50 MCG: 50 TABLET ORAL at 05:48

## 2018-01-09 RX ADMIN — PAROXETINE HYDROCHLORIDE 40 MG: 20 TABLET, FILM COATED ORAL at 10:18

## 2018-01-09 RX ADMIN — TOBRAMYCIN SULFATE 120 MG: 40 INJECTION, SOLUTION INTRAMUSCULAR; INTRAVENOUS at 15:33

## 2018-01-09 RX ADMIN — PRAMIPEXOLE DIHYDROCHLORIDE 0.5 MG: 0.5 TABLET ORAL at 10:19

## 2018-01-09 RX ADMIN — ENOXAPARIN SODIUM 40 MG: 40 INJECTION, SOLUTION INTRAVENOUS; SUBCUTANEOUS at 10:19

## 2018-01-09 RX ADMIN — DRONABINOL 2.5 MG: 2.5 CAPSULE ORAL at 10:22

## 2018-01-09 RX ADMIN — SUCRALFATE 1 G: 1 SUSPENSION ORAL at 17:00

## 2018-01-09 RX ADMIN — GABAPENTIN 300 MG: 300 CAPSULE ORAL at 10:16

## 2018-01-09 ASSESSMENT — PAIN DESCRIPTION - PROGRESSION: CLINICAL_PROGRESSION: NOT CHANGED

## 2018-01-09 ASSESSMENT — PAIN SCALES - WONG BAKER: WONGBAKER_NUMERICALRESPONSE: 6

## 2018-01-09 ASSESSMENT — PAIN SCALES - GENERAL
PAINLEVEL_OUTOF10: 3
PAINLEVEL_OUTOF10: 2

## 2018-01-09 NOTE — PROGRESS NOTES
adenopathy. No thyromegaly present. Cardiovascular: Normal rate, regular rhythm, intact distal pulses and normal pulses. Murmur heard. Pulmonary/Chest: Effort normal and breath sounds normal. She has no wheezes. She has no rales. She exhibits no tenderness. Abdominal: Soft. Bowel sounds are normal. There is no tenderness. Musculoskeletal: Normal range of motion. She exhibits edema (trace). She exhibits no tenderness. Neurological: She is alert. She exhibits altered mental status and a cognitive deficit. Skin: Skin is warm. No cyanosis. Nails show no clubbing.        LABS:  CBC:   Lab Results   Component Value Date    WBC 5.2 01/03/2018    RBC 3.45 01/03/2018    HGB 11.0 01/03/2018    HCT 32.7 01/03/2018    MCV 94.8 01/03/2018    MCH 31.9 01/03/2018    MCHC 33.7 01/03/2018    RDW 15.7 01/03/2018     01/03/2018    MPV 9.4 09/22/2015     CBC with Differential:    Lab Results   Component Value Date    WBC 5.2 01/03/2018    RBC 3.45 01/03/2018    HGB 11.0 01/03/2018    HCT 32.7 01/03/2018     01/03/2018    MCV 94.8 01/03/2018    MCH 31.9 01/03/2018    MCHC 33.7 01/03/2018    RDW 15.7 01/03/2018    LYMPHOPCT 17.9 01/02/2018    MONOPCT 8.8 01/02/2018    BASOPCT 1.1 01/02/2018    MONOSABS 0.6 01/02/2018    LYMPHSABS 1.2 01/02/2018    EOSABS 0.1 01/02/2018    BASOSABS 0.1 01/02/2018     CMP:    Lab Results   Component Value Date     01/03/2018    K 4.1 01/03/2018     01/03/2018    CO2 28 01/03/2018    BUN 9 01/03/2018    CREATININE 0.62 01/03/2018    GFRAA >60.0 01/03/2018    LABGLOM >60.0 01/03/2018    GLUCOSE 86 01/03/2018    PROT 7.4 01/02/2018    LABALBU 4.2 01/02/2018    CALCIUM 8.8 01/03/2018    BILITOT 0.3 01/02/2018    ALKPHOS 92 01/02/2018    AST 19 01/02/2018    ALT 6 01/02/2018     BMP:    Lab Results   Component Value Date     01/03/2018    K 4.1 01/03/2018     01/03/2018    CO2 28 01/03/2018    BUN 9 01/03/2018    LABALBU 4.2 01/02/2018    CREATININE 0.62

## 2018-01-09 NOTE — CARE COORDINATION
IV BENEFIT REQUEST FORM    FAX FROM: [facility]                        [address] [place]    REQUESTED BY: Electronically signed by Ricardo Fernandes RN on 2018 at 11:33 AM                                               RN/C3: PHONE: [phone])     DATE:/TIME OF REQUEST: 18  TIME: 11:33 AM      TO: [facility]      FAX TO: [phone]    PHONE: [phone]     THIS PATIENT HAS BEEN IDENTIFIED TO POSSIBLY NEED LONG TERM IV'S. PLEASE CHECK INSURANCE COVERAGE FOR THE FOLLOWING PT/DRUGS. PATIENT'S NAME: Kervin Dyer                              [facility]   PATIENT'S : 1940  PATIENT ADDRESS: [address] [place] [address]  [id number]      PAYOR NAME:  Payor: Brandi Rodas / Plan: Clista Gin / Product Type: *No Product type* /     DRUG: TOBRAMYCIN                120MG           EVER 12HRS      *IF [facility] IS NOT A PROVIDER FOR THIS PATIENT, PLEASE FORWARD INFO VIA FAX TO [facility] @ [phone],(PHONE NUMBER: [phone]) TO RUN BENEFIT VERIFICATION AND NOTIFY THE ABOVE C3 OF THIS PLAN. (FAX FACE SHEET WITH DEMOGRAPHICS AND INSURANCE INFO WITH THIS FORM.)  PLEASE FAX BENEFIT INFO TO: THE [phone]    This message is intended only for the use of the individual or entity to which it is addressed and may contain information that is privileged, confidential, and exempt from disclosure under applicable law. If the reader of the notice is not intended recipient of the employee/agent responsible for delivering the message to the intended recipient, you are hereby notified than any dissemination, distribution or copying of this communication is strictly prohibited. Please contact the sender for further instructions on handling the information.

## 2018-01-09 NOTE — PROGRESS NOTES
Q2H PRN  magnesium hydroxide (MILK OF MAGNESIA) 400 MG/5ML suspension 30 mL, 30 mL, Oral, Daily PRN  ondansetron (ZOFRAN) injection 4 mg, 4 mg, Intravenous, Q6H PRN  enoxaparin (LOVENOX) injection 40 mg, 40 mg, Subcutaneous, Daily  nitroGLYCERIN (NITROSTAT) SL tablet 0.4 mg, 0.4 mg, Sublingual, Q5 Min PRN  Physical    VITALS:  BP (!) 154/75   Pulse 66   Temp 98.4 °F (36.9 °C) (Oral)   Resp 17   Ht 5' 2\" (1.575 m)   Wt 142 lb 6.7 oz (64.6 kg)   SpO2 99%   BMI 26.05 kg/m²   HEENT: Normal HEENT exam.    Heart: Normal rate.  Positive for murmur.    Abdomen: Bowel sounds are normal.     Pulses: Distal pulses are intact.    Neurological: Patient is alert.    Pupils:  Pupils are equal, round, and reactive to light.    Skin:  Warm and dry.    Data    LABS  Recent Labs      01/09/18   0915   WBC  6.2   RBC  4.05*   HGB  12.7   HCT  38.7   MCV  95.6   MCH  31.4*   MCHC  32.8*   RDW  15.5*   PLT  156       Recent Labs      01/09/18   0915   NA  143   K  3.3*   CL  101   CO2  28   BUN  4*   CREATININE  0.60   GLUCOSE  139*   CALCIUM  9.2       No results for input(s): MG in the last 72 hours. ASSESSMENT AND PLAN      Active Hospital Problems    Diagnosis Date Noted    Tachycardia [R00.0] 01/02/2018     Priority: High    PSVT (paroxysmal supraventricular tachycardia) (HCC) [I47.1] 11/01/2017     Priority: High    Acute cystitis without hematuria [N30.00]     Essential hypertension [I10] 12/27/2017    Parkinson disease ([facility] Isaac Yoder 11/24/2017    Hypothyroidism [E03.9]      - SVT: resolved  - UTI: on tobramycin.  As per ID recs  - HTN: continue meds  - Hypothyroid: synthroid     Javier Gallardo MD  Pager : [phone]

## 2018-01-09 NOTE — CARE COORDINATION
IV CHECK SENT TO "[employer] IN CASE NEEDED. PER DR SOLIMAN'S NOTE PT WILL ONLY NEED A COUPLE MORE DAYS. D/C PLAN REMAINS HOME WITH [facility], [facility]. PALLIATIVE CARE ALSO ORDERED.

## 2018-01-10 LAB
ANION GAP SERPL CALCULATED.3IONS-SCNC: 13 MEQ/L (ref 7–13)
BASOPHILS ABSOLUTE: 0 K/UL (ref 0–0.2)
BASOPHILS RELATIVE PERCENT: 0.7 %
BUN BLDV-MCNC: 9 MG/DL (ref 8–23)
CALCIUM SERPL-MCNC: 8.9 MG/DL (ref 8.6–10.2)
CHLORIDE BLD-SCNC: 101 MEQ/L (ref 98–107)
CO2: 31 MEQ/L (ref 22–29)
CREAT SERPL-MCNC: 0.87 MG/DL (ref 0.5–0.9)
EOSINOPHILS ABSOLUTE: 0.2 K/UL (ref 0–0.7)
EOSINOPHILS RELATIVE PERCENT: 3.4 %
GFR AFRICAN AMERICAN: >60
GFR NON-AFRICAN AMERICAN: >60
GLUCOSE BLD-MCNC: 118 MG/DL (ref 74–109)
HCT VFR BLD CALC: 32.3 % (ref 37–47)
HEMOGLOBIN: 10.8 G/DL (ref 12–16)
LYMPHOCYTES ABSOLUTE: 1.1 K/UL (ref 1–4.8)
LYMPHOCYTES RELATIVE PERCENT: 21.8 %
MCH RBC QN AUTO: 31.4 PG (ref 27–31.3)
MCHC RBC AUTO-ENTMCNC: 33.3 % (ref 33–37)
MCV RBC AUTO: 94.2 FL (ref 82–100)
MONOCYTES ABSOLUTE: 0.3 K/UL (ref 0.2–0.8)
MONOCYTES RELATIVE PERCENT: 6.6 %
NEUTROPHILS ABSOLUTE: 3.3 K/UL (ref 1.4–6.5)
NEUTROPHILS RELATIVE PERCENT: 67.5 %
PDW BLD-RTO: 15.3 % (ref 11.5–14.5)
PLATELET # BLD: 129 K/UL (ref 130–400)
POTASSIUM SERPL-SCNC: 3.3 MEQ/L (ref 3.5–5.1)
RBC # BLD: 3.43 M/UL (ref 4.2–5.4)
SODIUM BLD-SCNC: 145 MEQ/L (ref 132–144)
WBC # BLD: 4.9 K/UL (ref 4.8–10.8)

## 2018-01-10 PROCEDURE — 97110 THERAPEUTIC EXERCISES: CPT

## 2018-01-10 PROCEDURE — 6360000002 HC RX W HCPCS: Performed by: NURSE PRACTITIONER

## 2018-01-10 PROCEDURE — 2580000003 HC RX 258: Performed by: INTERNAL MEDICINE

## 2018-01-10 PROCEDURE — 6370000000 HC RX 637 (ALT 250 FOR IP): Performed by: NURSE PRACTITIONER

## 2018-01-10 PROCEDURE — 6360000002 HC RX W HCPCS: Performed by: INTERNAL MEDICINE

## 2018-01-10 PROCEDURE — 93005 ELECTROCARDIOGRAM TRACING: CPT

## 2018-01-10 PROCEDURE — 36415 COLL VENOUS BLD VENIPUNCTURE: CPT

## 2018-01-10 PROCEDURE — 93010 ELECTROCARDIOGRAM REPORT: CPT | Performed by: INTERNAL MEDICINE

## 2018-01-10 PROCEDURE — 2060000000 HC ICU INTERMEDIATE R&B

## 2018-01-10 PROCEDURE — 6370000000 HC RX 637 (ALT 250 FOR IP): Performed by: INTERNAL MEDICINE

## 2018-01-10 PROCEDURE — 99232 SBSQ HOSP IP/OBS MODERATE 35: CPT | Performed by: INTERNAL MEDICINE

## 2018-01-10 PROCEDURE — 80048 BASIC METABOLIC PNL TOTAL CA: CPT

## 2018-01-10 PROCEDURE — 51702 INSERT TEMP BLADDER CATH: CPT

## 2018-01-10 PROCEDURE — 85025 COMPLETE CBC W/AUTO DIFF WBC: CPT

## 2018-01-10 PROCEDURE — 2580000003 HC RX 258: Performed by: NURSE PRACTITIONER

## 2018-01-10 PROCEDURE — 2700000000 HC OXYGEN THERAPY PER DAY

## 2018-01-10 RX ORDER — AMIODARONE HYDROCHLORIDE 200 MG/1
200 TABLET ORAL 2 TIMES DAILY
Status: DISCONTINUED | OUTPATIENT
Start: 2018-01-10 | End: 2018-01-12 | Stop reason: HOSPADM

## 2018-01-10 RX ADMIN — MICONAZOLE NITRATE: 20 POWDER TOPICAL at 08:33

## 2018-01-10 RX ADMIN — TIZANIDINE 4 MG: 4 TABLET ORAL at 21:27

## 2018-01-10 RX ADMIN — TRAMADOL HYDROCHLORIDE 25 MG: 50 TABLET, FILM COATED ORAL at 08:28

## 2018-01-10 RX ADMIN — TRAMADOL HYDROCHLORIDE 25 MG: 50 TABLET, FILM COATED ORAL at 21:28

## 2018-01-10 RX ADMIN — TOBRAMYCIN SULFATE 120 MG: 40 INJECTION, SOLUTION INTRAMUSCULAR; INTRAVENOUS at 14:10

## 2018-01-10 RX ADMIN — BUSPIRONE HYDROCHLORIDE 10 MG: 10 TABLET ORAL at 21:27

## 2018-01-10 RX ADMIN — MAGNESIUM HYDROXIDE 30 ML: 400 SUSPENSION ORAL at 21:24

## 2018-01-10 RX ADMIN — DRONABINOL 2.5 MG: 2.5 CAPSULE ORAL at 06:51

## 2018-01-10 RX ADMIN — CARBIDOPA AND LEVODOPA 1 TABLET: 25; 100 TABLET ORAL at 08:28

## 2018-01-10 RX ADMIN — GABAPENTIN 300 MG: 300 CAPSULE ORAL at 21:27

## 2018-01-10 RX ADMIN — TOBRAMYCIN SULFATE 120 MG: 40 INJECTION, SOLUTION INTRAMUSCULAR; INTRAVENOUS at 02:46

## 2018-01-10 RX ADMIN — AMIODARONE HYDROCHLORIDE 200 MG: 200 TABLET ORAL at 08:28

## 2018-01-10 RX ADMIN — Medication 500 MG: at 08:28

## 2018-01-10 RX ADMIN — LEVOTHYROXINE SODIUM 50 MCG: 50 TABLET ORAL at 06:51

## 2018-01-10 RX ADMIN — ASPIRIN 81 MG 81 MG: 81 TABLET ORAL at 08:27

## 2018-01-10 RX ADMIN — PAROXETINE HYDROCHLORIDE 40 MG: 20 TABLET, FILM COATED ORAL at 08:27

## 2018-01-10 RX ADMIN — SUCRALFATE 1 G: 1 SUSPENSION ORAL at 21:25

## 2018-01-10 RX ADMIN — GABAPENTIN 300 MG: 300 CAPSULE ORAL at 08:28

## 2018-01-10 RX ADMIN — PRAMIPEXOLE DIHYDROCHLORIDE 0.5 MG: 0.5 TABLET ORAL at 21:27

## 2018-01-10 RX ADMIN — PANTOPRAZOLE SODIUM 40 MG: 40 TABLET, DELAYED RELEASE ORAL at 06:51

## 2018-01-10 RX ADMIN — DRONABINOL 2.5 MG: 2.5 CAPSULE ORAL at 16:26

## 2018-01-10 RX ADMIN — SUCRALFATE 1 G: 1 SUSPENSION ORAL at 11:59

## 2018-01-10 RX ADMIN — METOPROLOL TARTRATE 100 MG: 50 TABLET ORAL at 21:27

## 2018-01-10 RX ADMIN — PRAMIPEXOLE DIHYDROCHLORIDE 0.5 MG: 0.5 TABLET ORAL at 08:28

## 2018-01-10 RX ADMIN — Medication 10 ML: at 08:28

## 2018-01-10 RX ADMIN — ROTIGOTINE 1 PATCH: 2 PATCH, EXTENDED RELEASE TRANSDERMAL at 08:29

## 2018-01-10 RX ADMIN — METOPROLOL TARTRATE 100 MG: 50 TABLET ORAL at 08:28

## 2018-01-10 RX ADMIN — SUCRALFATE 1 G: 1 SUSPENSION ORAL at 08:27

## 2018-01-10 RX ADMIN — LACTULOSE 10 G: 20 SOLUTION ORAL at 21:24

## 2018-01-10 RX ADMIN — PRAMIPEXOLE DIHYDROCHLORIDE 0.5 MG: 0.5 TABLET ORAL at 12:55

## 2018-01-10 RX ADMIN — CARBIDOPA AND LEVODOPA 1 TABLET: 25; 100 TABLET ORAL at 21:27

## 2018-01-10 RX ADMIN — MICONAZOLE NITRATE: 20 POWDER TOPICAL at 21:33

## 2018-01-10 RX ADMIN — CARBIDOPA AND LEVODOPA 1 TABLET: 25; 100 TABLET ORAL at 12:55

## 2018-01-10 RX ADMIN — Medication 10 ML: at 21:33

## 2018-01-10 RX ADMIN — GABAPENTIN 300 MG: 300 CAPSULE ORAL at 12:55

## 2018-01-10 RX ADMIN — SUCRALFATE 1 G: 1 SUSPENSION ORAL at 16:26

## 2018-01-10 RX ADMIN — ENOXAPARIN SODIUM 40 MG: 40 INJECTION, SOLUTION INTRAVENOUS; SUBCUTANEOUS at 08:29

## 2018-01-10 RX ADMIN — AMIODARONE HYDROCHLORIDE 200 MG: 200 TABLET ORAL at 21:27

## 2018-01-10 ASSESSMENT — PAIN SCALES - GENERAL
PAINLEVEL_OUTOF10: 0
PAINLEVEL_OUTOF10: 5
PAINLEVEL_OUTOF10: 5

## 2018-01-10 ASSESSMENT — PAIN DESCRIPTION - LOCATION: LOCATION: ABDOMEN

## 2018-01-10 ASSESSMENT — PAIN DESCRIPTION - PAIN TYPE: TYPE: ACUTE PAIN

## 2018-01-10 NOTE — PROGRESS NOTES
Progress Note  Patient: Melinda Valdes  Unit/Bed: V959/F861-63  YOB: 1940  MRN: [id number]  Acct: [de-identified]   Admitting Diagnosis: Tachycardia [R00.0]  Admit Date:  1/2/2018  Hospital Day: 8    Chief Complaint:UTI NSVT VT    Histories:  Past Medical History:   Diagnosis Date    Depression     Fatigue     Fibromyalgia     Hyperlipidemia     Hypertension     Hypothyroid     Levodopa-induced dyskinesia     Lumbago     Muscular wasting and disuse atrophy     Myalgia     Osteoarthritis     Paralysis agitans ([facility] [address]     Parkinson's disease ([facility] [address] [facility]     Sepsis ([facility] [address] [facility] 11/24/2017    Spinal stenosis     Thyroid disease     Urinary frequency      History reviewed. No pertinent surgical history. History reviewed. No pertinent family history. Social History     Social History    Marital status:      Spouse name: N/A    Number of children: N/A    Years of education: N/A     Social History Main Topics    Smoking status: Never Smoker    Smokeless tobacco: Never Used    Alcohol use No    Drug use: No    Sexual activity: Not Asked     Other Topics Concern    None     Social History Narrative    None       Subjective/HPI: on iv ABX. Responding well to Amiodarone. No more Arrhythmia noted. But, she is Bradycardic 40s overnight. No pauses. She feels fine    EKG:        Review of Systems:   Review of Systems  No cp abd pain. Breathing fine. Feels hungry. No bleed. Has not walked    Physical Examination:    BP (!) 164/93   Pulse 65   Temp 98.2 °F (36.8 °C) (Oral)   Resp 20   Ht 5' 2\" (1.575 m)   Wt 142 lb 6.7 oz (64.6 kg)   SpO2 99%   BMI 26.05 kg/m²    Physical Exam   Constitutional: She appears healthy. No distress. HENT:   Normal cephalic and Atraumatic   Eyes: Pupils are equal, round, and reactive to light. Neck: Normal range of motion and thyroid normal. Neck supple. No JVD present. No neck adenopathy. No thyromegaly present.    Cardiovascular: Regular rhythm, intact distal

## 2018-01-10 NOTE — PROGRESS NOTES
for increased tolerance to functional activities. Assessment   Assessment: Fair tolerance to therex this date. VC/TC for improved ROM with heel slides and ABD. Discharge Recommendations:  Continue to assess pending progress    Goals  Short term goals  Short term goal 1: rolling with SBA  Short term goal 2: supine<>Sit with SBA  Short term goal 3: supine and seated HEP  Short term goal 4: to be assessed for functional transfers  Short term goal 5: seated balance activities with ability to self correct LOB with <25% VC  Patient Goals   Patient goals : Go home    Plan    Plan  Times per week: 3-6  Times per day: Daily  Current Treatment Recommendations: Strengthening, Functional Mobility Training, Neuromuscular Re-education, Home Exercise Program, Equipment Evaluation, Education, & procurement, Safety Education & Training, Gait Training, Transfer Training, Balance Training, Endurance Training, Patient/Caregiver Education & Training  Safety Devices  Type of devices:  All fall risk precautions in place     Therapy Time   Individual   Time In 1005   Time Out 1022   Minutes 17            Activity Minutes Units   Ther Ex 17 1   Gait     Neuro Ed     Transfers/BM           Xu Archibald PTA, 01/10/18 at 10:25 AM

## 2018-01-11 PROCEDURE — 6370000000 HC RX 637 (ALT 250 FOR IP): Performed by: INTERNAL MEDICINE

## 2018-01-11 PROCEDURE — 93010 ELECTROCARDIOGRAM REPORT: CPT | Performed by: INTERNAL MEDICINE

## 2018-01-11 PROCEDURE — 6370000000 HC RX 637 (ALT 250 FOR IP): Performed by: NURSE PRACTITIONER

## 2018-01-11 PROCEDURE — 2700000000 HC OXYGEN THERAPY PER DAY

## 2018-01-11 PROCEDURE — 6360000002 HC RX W HCPCS: Performed by: NURSE PRACTITIONER

## 2018-01-11 PROCEDURE — 2060000000 HC ICU INTERMEDIATE R&B

## 2018-01-11 PROCEDURE — 2580000003 HC RX 258: Performed by: NURSE PRACTITIONER

## 2018-01-11 PROCEDURE — 99238 HOSP IP/OBS DSCHRG MGMT 30/<: CPT | Performed by: INTERNAL MEDICINE

## 2018-01-11 PROCEDURE — 93005 ELECTROCARDIOGRAM TRACING: CPT

## 2018-01-11 PROCEDURE — 6360000002 HC RX W HCPCS: Performed by: INTERNAL MEDICINE

## 2018-01-11 PROCEDURE — 2580000003 HC RX 258: Performed by: INTERNAL MEDICINE

## 2018-01-11 RX ORDER — LISINOPRIL 5 MG/1
5 TABLET ORAL DAILY
Qty: 30 TABLET | Refills: 3 | Status: SHIPPED | OUTPATIENT
Start: 2018-01-11 | End: 2018-06-20

## 2018-01-11 RX ORDER — POTASSIUM CHLORIDE 20 MEQ/1
40 TABLET, EXTENDED RELEASE ORAL
Status: COMPLETED | OUTPATIENT
Start: 2018-01-11 | End: 2018-01-11

## 2018-01-11 RX ORDER — AMIODARONE HYDROCHLORIDE 200 MG/1
200 TABLET ORAL DAILY
Qty: 30 TABLET | Refills: 3 | Status: SHIPPED | OUTPATIENT
Start: 2018-01-11 | End: 2018-02-02 | Stop reason: SDUPTHER

## 2018-01-11 RX ADMIN — GABAPENTIN 300 MG: 300 CAPSULE ORAL at 13:48

## 2018-01-11 RX ADMIN — DRONABINOL 2.5 MG: 2.5 CAPSULE ORAL at 05:55

## 2018-01-11 RX ADMIN — DRONABINOL 2.5 MG: 2.5 CAPSULE ORAL at 18:28

## 2018-01-11 RX ADMIN — LISINOPRIL 5 MG: 5 TABLET ORAL at 09:23

## 2018-01-11 RX ADMIN — AMIODARONE HYDROCHLORIDE 200 MG: 200 TABLET ORAL at 19:49

## 2018-01-11 RX ADMIN — MICONAZOLE NITRATE: 20 POWDER TOPICAL at 09:25

## 2018-01-11 RX ADMIN — TOBRAMYCIN SULFATE 120 MG: 40 INJECTION, SOLUTION INTRAMUSCULAR; INTRAVENOUS at 15:19

## 2018-01-11 RX ADMIN — AMIODARONE HYDROCHLORIDE 200 MG: 200 TABLET ORAL at 09:24

## 2018-01-11 RX ADMIN — Medication 500 MG: at 09:23

## 2018-01-11 RX ADMIN — GABAPENTIN 300 MG: 300 CAPSULE ORAL at 09:23

## 2018-01-11 RX ADMIN — TRAMADOL HYDROCHLORIDE 25 MG: 50 TABLET, FILM COATED ORAL at 09:23

## 2018-01-11 RX ADMIN — ROTIGOTINE 1 PATCH: 2 PATCH, EXTENDED RELEASE TRANSDERMAL at 09:24

## 2018-01-11 RX ADMIN — Medication 10 ML: at 09:25

## 2018-01-11 RX ADMIN — PAROXETINE HYDROCHLORIDE 40 MG: 20 TABLET, FILM COATED ORAL at 09:23

## 2018-01-11 RX ADMIN — CARBIDOPA AND LEVODOPA 1 TABLET: 25; 100 TABLET ORAL at 13:48

## 2018-01-11 RX ADMIN — PRAMIPEXOLE DIHYDROCHLORIDE 0.5 MG: 0.5 TABLET ORAL at 19:48

## 2018-01-11 RX ADMIN — SUCRALFATE 1 G: 1 SUSPENSION ORAL at 18:28

## 2018-01-11 RX ADMIN — LACTULOSE 10 G: 20 SOLUTION ORAL at 18:28

## 2018-01-11 RX ADMIN — METOPROLOL TARTRATE 100 MG: 50 TABLET ORAL at 19:49

## 2018-01-11 RX ADMIN — PANTOPRAZOLE SODIUM 40 MG: 40 TABLET, DELAYED RELEASE ORAL at 05:55

## 2018-01-11 RX ADMIN — CARBIDOPA AND LEVODOPA 1 TABLET: 25; 100 TABLET ORAL at 19:49

## 2018-01-11 RX ADMIN — SUCRALFATE 1 G: 1 SUSPENSION ORAL at 13:48

## 2018-01-11 RX ADMIN — TRAMADOL HYDROCHLORIDE 25 MG: 50 TABLET, FILM COATED ORAL at 19:49

## 2018-01-11 RX ADMIN — LEVOTHYROXINE SODIUM 50 MCG: 50 TABLET ORAL at 05:55

## 2018-01-11 RX ADMIN — CARBIDOPA AND LEVODOPA 1 TABLET: 25; 100 TABLET ORAL at 09:23

## 2018-01-11 RX ADMIN — TIZANIDINE 4 MG: 4 TABLET ORAL at 19:49

## 2018-01-11 RX ADMIN — POTASSIUM CHLORIDE 40 MEQ: 1500 TABLET, EXTENDED RELEASE ORAL at 09:24

## 2018-01-11 RX ADMIN — ASPIRIN 81 MG 81 MG: 81 TABLET ORAL at 09:24

## 2018-01-11 RX ADMIN — Medication 10 ML: at 19:50

## 2018-01-11 RX ADMIN — TOBRAMYCIN SULFATE 120 MG: 40 INJECTION, SOLUTION INTRAMUSCULAR; INTRAVENOUS at 03:41

## 2018-01-11 RX ADMIN — PRAMIPEXOLE DIHYDROCHLORIDE 0.5 MG: 0.5 TABLET ORAL at 09:24

## 2018-01-11 RX ADMIN — METOPROLOL TARTRATE 100 MG: 50 TABLET ORAL at 09:23

## 2018-01-11 RX ADMIN — PRAMIPEXOLE DIHYDROCHLORIDE 0.5 MG: 0.5 TABLET ORAL at 13:48

## 2018-01-11 RX ADMIN — Medication 500 MG: at 19:49

## 2018-01-11 RX ADMIN — SUCRALFATE 1 G: 1 SUSPENSION ORAL at 09:24

## 2018-01-11 RX ADMIN — ENOXAPARIN SODIUM 40 MG: 40 INJECTION, SOLUTION INTRAVENOUS; SUBCUTANEOUS at 09:24

## 2018-01-11 RX ADMIN — MICONAZOLE NITRATE: 20 POWDER TOPICAL at 19:50

## 2018-01-11 RX ADMIN — BUSPIRONE HYDROCHLORIDE 10 MG: 10 TABLET ORAL at 19:49

## 2018-01-11 RX ADMIN — SUCRALFATE 1 G: 1 SUSPENSION ORAL at 19:49

## 2018-01-11 RX ADMIN — GABAPENTIN 300 MG: 300 CAPSULE ORAL at 19:49

## 2018-01-11 ASSESSMENT — PAIN SCALES - GENERAL
PAINLEVEL_OUTOF10: 7
PAINLEVEL_OUTOF10: 0
PAINLEVEL_OUTOF10: 0

## 2018-01-11 NOTE — PROGRESS NOTES
Physical Therapy Missed Treatment   Facility/Department: [facility] MED SURG C979/C226-01    NAME: Brittaney Medina  Patient Status:   : 1940 (70 y.o.)  MRN: [id number]  Account: [de-identified]  Gender: female        [x] Patient Declines PT Treatment pt adamantly refused, said she needs to poop and is going home today. [] Patient Unavailable: Will attempt PT Treatment again at earliest convenience.         Electronically signed by Walter Clemens PTA on 18 at 11:36 AM

## 2018-01-11 NOTE — CARE COORDINATION
PER DR DAVILA PLAN FOR DISCHARGE LATER TODAY AFTER SEEN BY DR Vivi Robison, PER HIS NOTE 1/10 PT NEEDED ONE MORE DAY OF IV ABX. PT IS HOPING TO GO HOME TODAY. [facility] UPDATED.

## 2018-01-11 NOTE — HOME CARE
Spoke to Jeremi Rolle at [facility] home care and she confirmed pt is current with their agency. Routed ADT, and current dr progress notes to [place] along with the resume order. Also called Fabi Bales at [facility] and informed him of probable dc.

## 2018-01-11 NOTE — FLOWSHEET NOTE
HS assessment completed. Patient resting in bed at this time and states she is having pain in her stomach. States that she hasn't pooped but then also states that she had a small amount yesterday. Medicated with Milk of Magnesia per MAR. Denies any chest pain. NSR on the monitor. No edema noted. Pedal pulses palpable. Shortness of breath noted when patient is anxious. Lungs diminished bilaterally. SATS 95-97% on 2L NC. Patient is A/Ox2-3 with occasional periods of confusion. She reorients easily. Bedrest with turn Q2 hr. Trevizo catheter to gravity drainage with hazy dark yellow urine noted. Skin remains warm, dry and intact. #20g SL to left forearm, flushed and patent. Vital signs stable and HS medications provided. Call light remains at her side.

## 2018-01-11 NOTE — DISCHARGE SUMMARY
MCHC 32.8 (L) 33.0 - 37.0 %    RDW 15.5 (H) 11.5 - 14.5 %    Platelets 001 638 - 273 K/uL   Basic Metabolic Panel    Collection Time: 01/09/18  9:15 AM   Result Value Ref Range    Sodium 143 132 - 144 mEq/L    Potassium 3.3 (L) 3.5 - 5.1 mEq/L    Chloride 101 98 - 107 mEq/L    CO2 28 22 - 29 mEq/L    Anion Gap 14 (H) 7 - 13 mEq/L    Glucose 139 (H) 74 - 109 mg/dL    BUN 4 (L) 8 - 23 mg/dL    CREATININE 0.60 0.50 - 0.90 mg/dL    GFR Non-African American >60.0 >60    GFR  >60.0 >60    Calcium 9.2 8.6 - 10.2 mg/dL   EKG 12 lead    Collection Time: 01/10/18  5:21 AM   Result Value Ref Range    Ventricular Rate 51 BPM    Atrial Rate 51 BPM    P-R Interval 132 ms    QRS Duration 78 ms    Q-T Interval 460 ms    QTc Calculation (Bazett) 423 ms    P Axis 39 degrees    R Axis -10 degrees    T Axis 18 degrees   CBC Auto Differential    Collection Time: 01/10/18  2:49 PM   Result Value Ref Range    WBC 4.9 4.8 - 10.8 K/uL    RBC 3.43 (L) 4.20 - 5.40 M/uL    Hemoglobin 10.8 (L) 12.0 - 16.0 g/dL    Hematocrit 32.3 (L) 37.0 - 47.0 %    MCV 94.2 82.0 - 100.0 fL    MCH 31.4 (H) 27.0 - 31.3 pg    MCHC 33.3 33.0 - 37.0 %    RDW 15.3 (H) 11.5 - 14.5 %    Platelets 042 (L) 438 - 400 K/uL    Neutrophils % 67.5 %    Lymphocytes % 21.8 %    Monocytes % 6.6 %    Eosinophils % 3.4 %    Basophils % 0.7 %    Neutrophils # 3.3 1.4 - 6.5 K/uL    Lymphocytes # 1.1 1.0 - 4.8 K/uL    Monocytes # 0.3 0.2 - 0.8 K/uL    Eosinophils # 0.2 0.0 - 0.7 K/uL    Basophils # 0.0 0.0 - 0.2 K/uL   Basic Metabolic Panel    Collection Time: 01/10/18  2:49 PM   Result Value Ref Range    Sodium 145 (H) 132 - 144 mEq/L    Potassium 3.3 (L) 3.5 - 5.1 mEq/L    Chloride 101 98 - 107 mEq/L    CO2 31 (H) 22 - 29 mEq/L    Anion Gap 13 7 - 13 mEq/L    Glucose 118 (H) 74 - 109 mg/dL    BUN 9 8 - 23 mg/dL    CREATININE 0.87 0.50 - 0.90 mg/dL    GFR Non-African American >60.0 >60    GFR  >60.0 >60    Calcium 8.9 8.6 - 10.2 mg/dL   EKG 12 lead Collection Time: 01/11/18  2:23 AM   Result Value Ref Range    Ventricular Rate 64 BPM    Atrial Rate 64 BPM    P-R Interval 150 ms    QRS Duration 80 ms    Q-T Interval 430 ms    QTc Calculation (Bazett) 443 ms    P Axis 61 degrees    R Axis 2 degrees    T Axis 25 degrees           Follow-up visits:   Florecita Bai  [address] [facility] [address] [address]  [address] [address]  [address], [address] [facility]  [address]  [address] [facility]  [phone]    Schedule an appointment as soon as possible for a visit in 3 weeks         [facility]    Diagnosis Date Noted    Tachycardia [R00.0] 01/02/2018     Priority: High    PSVT (paroxysmal supraventricular tachycardia) (HCC) [I47.1] 11/01/2017     Priority: High    Acute cystitis without hematuria [N30.00]      Priority: Low    Essential hypertension [I10] 12/27/2017     Priority: Low    Parkinson disease ([address] Pedro Leo 11/24/2017     Priority: Low    Hypothyroidism [E03.9]      Priority: Low     1. PSVT/VT- stable on Amiodaone- decrease dose to 200 qd. Continue BB  2. UTI- DR. Kiran Penny.   3. Arrange home health             Electronically signed by Shahram Hodge MD on 1/11/2018 at 7:11 AM

## 2018-01-12 VITALS
WEIGHT: 142.42 LBS | RESPIRATION RATE: 19 BRPM | HEIGHT: 62 IN | TEMPERATURE: 98.2 F | BODY MASS INDEX: 26.21 KG/M2 | DIASTOLIC BLOOD PRESSURE: 72 MMHG | HEART RATE: 63 BPM | OXYGEN SATURATION: 94 % | SYSTOLIC BLOOD PRESSURE: 175 MMHG

## 2018-01-12 LAB
EKG ATRIAL RATE: 116 BPM
EKG ATRIAL RATE: 164 BPM
EKG ATRIAL RATE: 51 BPM
EKG ATRIAL RATE: 53 BPM
EKG ATRIAL RATE: 60 BPM
EKG ATRIAL RATE: 61 BPM
EKG ATRIAL RATE: 62 BPM
EKG ATRIAL RATE: 64 BPM
EKG ATRIAL RATE: 67 BPM
EKG ATRIAL RATE: 69 BPM
EKG ATRIAL RATE: 69 BPM
EKG ATRIAL RATE: 71 BPM
EKG P AXIS: 37 DEGREES
EKG P AXIS: 39 DEGREES
EKG P AXIS: 50 DEGREES
EKG P AXIS: 54 DEGREES
EKG P AXIS: 55 DEGREES
EKG P AXIS: 60 DEGREES
EKG P AXIS: 61 DEGREES
EKG P AXIS: 67 DEGREES
EKG P AXIS: 73 DEGREES
EKG P-R INTERVAL: 118 MS
EKG P-R INTERVAL: 126 MS
EKG P-R INTERVAL: 128 MS
EKG P-R INTERVAL: 132 MS
EKG P-R INTERVAL: 132 MS
EKG P-R INTERVAL: 136 MS
EKG P-R INTERVAL: 136 MS
EKG P-R INTERVAL: 148 MS
EKG P-R INTERVAL: 150 MS
EKG P-R INTERVAL: 152 MS
EKG Q-T INTERVAL: 194 MS
EKG Q-T INTERVAL: 306 MS
EKG Q-T INTERVAL: 380 MS
EKG Q-T INTERVAL: 390 MS
EKG Q-T INTERVAL: 392 MS
EKG Q-T INTERVAL: 400 MS
EKG Q-T INTERVAL: 402 MS
EKG Q-T INTERVAL: 410 MS
EKG Q-T INTERVAL: 426 MS
EKG Q-T INTERVAL: 428 MS
EKG Q-T INTERVAL: 430 MS
EKG Q-T INTERVAL: 460 MS
EKG QRS DURATION: 68 MS
EKG QRS DURATION: 68 MS
EKG QRS DURATION: 70 MS
EKG QRS DURATION: 76 MS
EKG QRS DURATION: 76 MS
EKG QRS DURATION: 78 MS
EKG QRS DURATION: 80 MS
EKG QRS DURATION: 80 MS
EKG QRS DURATION: 84 MS
EKG QRS DURATION: 84 MS
EKG QTC CALCULATION (BAZETT): 320 MS
EKG QTC CALCULATION (BAZETT): 399 MS
EKG QTC CALCULATION (BAZETT): 404 MS
EKG QTC CALCULATION (BAZETT): 412 MS
EKG QTC CALCULATION (BAZETT): 414 MS
EKG QTC CALCULATION (BAZETT): 416 MS
EKG QTC CALCULATION (BAZETT): 417 MS
EKG QTC CALCULATION (BAZETT): 423 MS
EKG QTC CALCULATION (BAZETT): 425 MS
EKG QTC CALCULATION (BAZETT): 428 MS
EKG QTC CALCULATION (BAZETT): 428 MS
EKG QTC CALCULATION (BAZETT): 443 MS
EKG R AXIS: -10 DEGREES
EKG R AXIS: -20 DEGREES
EKG R AXIS: -21 DEGREES
EKG R AXIS: -23 DEGREES
EKG R AXIS: -25 DEGREES
EKG R AXIS: -26 DEGREES
EKG R AXIS: -39 DEGREES
EKG R AXIS: -7 DEGREES
EKG R AXIS: 2 DEGREES
EKG R AXIS: 4 DEGREES
EKG T AXIS: 14 DEGREES
EKG T AXIS: 18 DEGREES
EKG T AXIS: 19 DEGREES
EKG T AXIS: 22 DEGREES
EKG T AXIS: 24 DEGREES
EKG T AXIS: 241 DEGREES
EKG T AXIS: 25 DEGREES
EKG T AXIS: 29 DEGREES
EKG T AXIS: 3 DEGREES
EKG T AXIS: 30 DEGREES
EKG T AXIS: 31 DEGREES
EKG T AXIS: 33 DEGREES
EKG VENTRICULAR RATE: 116 BPM
EKG VENTRICULAR RATE: 164 BPM
EKG VENTRICULAR RATE: 51 BPM
EKG VENTRICULAR RATE: 53 BPM
EKG VENTRICULAR RATE: 60 BPM
EKG VENTRICULAR RATE: 61 BPM
EKG VENTRICULAR RATE: 62 BPM
EKG VENTRICULAR RATE: 64 BPM
EKG VENTRICULAR RATE: 67 BPM
EKG VENTRICULAR RATE: 69 BPM
EKG VENTRICULAR RATE: 69 BPM
EKG VENTRICULAR RATE: 71 BPM

## 2018-01-12 PROCEDURE — 2580000003 HC RX 258: Performed by: NURSE PRACTITIONER

## 2018-01-12 PROCEDURE — 99231 SBSQ HOSP IP/OBS SF/LOW 25: CPT | Performed by: INTERNAL MEDICINE

## 2018-01-12 PROCEDURE — 6370000000 HC RX 637 (ALT 250 FOR IP): Performed by: NURSE PRACTITIONER

## 2018-01-12 PROCEDURE — 6370000000 HC RX 637 (ALT 250 FOR IP): Performed by: INTERNAL MEDICINE

## 2018-01-12 PROCEDURE — 2580000003 HC RX 258: Performed by: INTERNAL MEDICINE

## 2018-01-12 PROCEDURE — 2700000000 HC OXYGEN THERAPY PER DAY

## 2018-01-12 PROCEDURE — 93005 ELECTROCARDIOGRAM TRACING: CPT

## 2018-01-12 PROCEDURE — 93010 ELECTROCARDIOGRAM REPORT: CPT | Performed by: INTERNAL MEDICINE

## 2018-01-12 PROCEDURE — 6360000002 HC RX W HCPCS: Performed by: NURSE PRACTITIONER

## 2018-01-12 PROCEDURE — 6360000002 HC RX W HCPCS: Performed by: INTERNAL MEDICINE

## 2018-01-12 RX ADMIN — PAROXETINE HYDROCHLORIDE 40 MG: 20 TABLET, FILM COATED ORAL at 09:09

## 2018-01-12 RX ADMIN — ASPIRIN 81 MG 81 MG: 81 TABLET ORAL at 09:09

## 2018-01-12 RX ADMIN — TRAMADOL HYDROCHLORIDE 25 MG: 50 TABLET, FILM COATED ORAL at 09:08

## 2018-01-12 RX ADMIN — TOBRAMYCIN SULFATE 120 MG: 40 INJECTION, SOLUTION INTRAMUSCULAR; INTRAVENOUS at 04:18

## 2018-01-12 RX ADMIN — MICONAZOLE NITRATE: 20 POWDER TOPICAL at 09:10

## 2018-01-12 RX ADMIN — ROTIGOTINE 1 PATCH: 2 PATCH, EXTENDED RELEASE TRANSDERMAL at 09:09

## 2018-01-12 RX ADMIN — AMIODARONE HYDROCHLORIDE 200 MG: 200 TABLET ORAL at 09:09

## 2018-01-12 RX ADMIN — Medication 10 ML: at 09:09

## 2018-01-12 RX ADMIN — DRONABINOL 2.5 MG: 2.5 CAPSULE ORAL at 06:52

## 2018-01-12 RX ADMIN — LEVOTHYROXINE SODIUM 50 MCG: 50 TABLET ORAL at 06:52

## 2018-01-12 RX ADMIN — ENOXAPARIN SODIUM 40 MG: 40 INJECTION, SOLUTION INTRAVENOUS; SUBCUTANEOUS at 09:09

## 2018-01-12 RX ADMIN — Medication 500 MG: at 09:09

## 2018-01-12 RX ADMIN — PANTOPRAZOLE SODIUM 40 MG: 40 TABLET, DELAYED RELEASE ORAL at 06:52

## 2018-01-12 RX ADMIN — LISINOPRIL 5 MG: 5 TABLET ORAL at 09:09

## 2018-01-12 RX ADMIN — PRAMIPEXOLE DIHYDROCHLORIDE 0.5 MG: 0.5 TABLET ORAL at 09:08

## 2018-01-12 RX ADMIN — CARBIDOPA AND LEVODOPA 1 TABLET: 25; 100 TABLET ORAL at 09:08

## 2018-01-12 RX ADMIN — SUCRALFATE 1 G: 1 SUSPENSION ORAL at 09:09

## 2018-01-12 RX ADMIN — GABAPENTIN 300 MG: 300 CAPSULE ORAL at 09:08

## 2018-01-12 RX ADMIN — METOPROLOL TARTRATE 100 MG: 50 TABLET ORAL at 09:09

## 2018-01-12 ASSESSMENT — PAIN SCALES - GENERAL: PAINLEVEL_OUTOF10: 0

## 2018-01-12 NOTE — PROGRESS NOTES
Physical Therapy  [facility]  Physical Therapy    Date: 2018  Patient Name: Ana Kulkarni        : 1940       [x] Pt Refusal Being discharged.            [] Pt Unavailable due to:          Juan Carlos Vincent Date: 2018  Electronically signed by Michael Cotto PTA on 2018 at 1:23 PM

## 2018-01-12 NOTE — PROGRESS NOTES
Progress Note  Patient: Claire Man  Unit/Bed: Z890/S329-26  YOB: 1940  MRN: [id number]  Acct: [de-identified]   Admitting Diagnosis: Tachycardia [R00.0]  Admit Date:  1/2/2018  Hospital Day: 8    Chief Complaint:PSVT VT UTI    Histories:  Past Medical History:   Diagnosis Date    Depression     Fatigue     Fibromyalgia     Hyperlipidemia     Hypertension     Hypothyroid     Levodopa-induced dyskinesia     Lumbago     Muscular wasting and disuse atrophy     Myalgia     Osteoarthritis     Paralysis agitans ([facility] [address]     Parkinson's disease ([facility] [address]     Sepsis ([facility] [address] 11/24/2017    Spinal stenosis     Thyroid disease     Urinary frequency      History reviewed. No pertinent surgical history. History reviewed. No pertinent family history. Social History     Social History    Marital status:      Spouse name: N/A    Number of children: N/A    Years of education: N/A     Social History Main Topics    Smoking status: Never Smoker    Smokeless tobacco: Never Used    Alcohol use No    Drug use: No    Sexual activity: Not Asked     Other Topics Concern    None     Social History Narrative    None       Subjective/HPINo event sover night. Remains in SR rate 50s on tele. Vitals stable    EKG:  SR      Review of Systems:   Review of Systems   No CP No ABd PAin eating. Physical Examination:    BP (!) 144/66   Pulse 70   Temp 98.2 °F (36.8 °C) (Oral)   Resp 20   Ht 5' 2\" (1.575 m)   Wt 142 lb 6.7 oz (64.6 kg)   SpO2 97%   BMI 26.05 kg/m²    Physical Exam   Constitutional: She appears healthy. No distress. HENT:   Normal cephalic and Atraumatic   Eyes: Pupils are equal, round, and reactive to light. Neck: Normal range of motion and thyroid normal. Neck supple. No JVD present. No neck adenopathy. No thyromegaly present. Cardiovascular: Normal rate, regular rhythm, intact distal pulses and normal pulses. Murmur heard.   Pulmonary/Chest: Effort normal and breath sounds normal. TROPONINI 0.014 01/02/2018        Active Hospital Problems    Diagnosis Date Noted    Tachycardia [R00.0] 01/02/2018     Priority: High    PSVT (paroxysmal supraventricular tachycardia) (HCC) [I47.1] 11/01/2017     Priority: High    Acute cystitis without hematuria [N30.00]      Priority: Low    Essential hypertension [I10] 12/27/2017     Priority: Low    Parkinson disease ([address] Layjeniferlevi Backes 11/24/2017     Priority: Low    Hypothyroidism [E03.9]      Priority: Low        Assessment/Plan:  1. PSVT/VT stable. normal LV Tolerating Amiodarone  2. UTI- still on ABX  3.  Home today after ID evaluation       Electronically signed by Joon Whitney MD on 1/12/2018 at 6:47 AM

## 2018-01-16 NOTE — PROGRESS NOTES
Physical Therapy  Facility/Department: [facility] F016/O087-37  Physical Therapy Discharge      NAME: Roshan Awan    : 1940 (70 y.o.)  MRN: [id number]    Account: [de-identified]  Gender: female      Patient has been discharged from acute care hospital. DC patient from current PT program.      Electronically signed by Jennifer Raza PT on 18 at 4:45 PM

## 2018-01-22 ENCOUNTER — OFFICE VISIT (OUTPATIENT)
Dept: PALLATIVE CARE | Age: 78
End: 2018-01-22
Payer: MEDICARE

## 2018-01-22 VITALS
HEART RATE: 84 BPM | TEMPERATURE: 98.6 F | RESPIRATION RATE: 16 BRPM | DIASTOLIC BLOOD PRESSURE: 74 MMHG | SYSTOLIC BLOOD PRESSURE: 128 MMHG | OXYGEN SATURATION: 96 %

## 2018-01-22 DIAGNOSIS — M54.50 ACUTE BILATERAL LOW BACK PAIN WITHOUT SCIATICA: ICD-10-CM

## 2018-01-22 DIAGNOSIS — G89.29 OTHER CHRONIC PAIN: Primary | ICD-10-CM

## 2018-01-22 DIAGNOSIS — R06.02 SOB (SHORTNESS OF BREATH): ICD-10-CM

## 2018-01-22 DIAGNOSIS — R54 AGE-RELATED PHYSICAL DEBILITY: ICD-10-CM

## 2018-01-22 PROCEDURE — 99345 HOME/RES VST NEW HIGH MDM 75: CPT | Performed by: NURSE PRACTITIONER

## 2018-01-22 RX ORDER — HYDROCODONE BITARTRATE AND ACETAMINOPHEN 5; 325 MG/1; MG/1
1 TABLET ORAL 3 TIMES DAILY
Qty: 90 TABLET | Refills: 0 | Status: SHIPPED | OUTPATIENT
Start: 2018-01-22 | End: 2018-02-06 | Stop reason: SDUPTHER

## 2018-01-22 RX ORDER — GABAPENTIN 300 MG/1
300 CAPSULE ORAL 2 TIMES DAILY
Qty: 60 CAPSULE | Refills: 0 | Status: SHIPPED | OUTPATIENT
Start: 2018-01-22 | End: 2018-09-06 | Stop reason: SDUPTHER

## 2018-01-22 RX ORDER — ALBUTEROL SULFATE 90 UG/1
2 AEROSOL, METERED RESPIRATORY (INHALATION) EVERY 6 HOURS PRN
Qty: 1 INHALER | Refills: 3 | Status: SHIPPED | OUTPATIENT
Start: 2018-01-22 | End: 2018-02-14

## 2018-01-22 RX ORDER — DOXYCYCLINE HYCLATE 100 MG/1
100 CAPSULE ORAL 2 TIMES DAILY
Qty: 20 CAPSULE | Refills: 0 | Status: SHIPPED | OUTPATIENT
Start: 2018-01-22 | End: 2018-02-01

## 2018-01-22 RX ORDER — GABAPENTIN 600 MG/1
600 TABLET ORAL EVERY EVENING
Qty: 30 TABLET | Refills: 3 | Status: SHIPPED | OUTPATIENT
Start: 2018-01-22 | End: 2018-07-26 | Stop reason: CLARIF

## 2018-01-22 ASSESSMENT — ENCOUNTER SYMPTOMS
COLOR CHANGE: 0
BLOOD IN STOOL: 0
ABDOMINAL PAIN: 0
ABDOMINAL DISTENTION: 0
NAUSEA: 0
VOMITING: 0
CONSTIPATION: 0
DIARRHEA: 0
COUGH: 0
BACK PAIN: 1
SORE THROAT: 0
CHOKING: 0
CHEST TIGHTNESS: 0
WHEEZING: 0
SHORTNESS OF BREATH: 1
TROUBLE SWALLOWING: 0

## 2018-01-22 NOTE — PROGRESS NOTES
Patient ID:  Madiha Carmona is a 70 y.o. female of Gume Mclaughlin CNP , referred by Dr. Glory Garcia for Initial Palliative Care Consult to address symptom management, advance care planning and goals of care conversation. Madiha Carmona is being seen today at their home in [facility]. Patient presents today with   Chief Complaint   Patient presents with    Pain    Shortness of Breath    Other     debility        HPI:  Patient appears uncomfortable and anxious, lying in her home medical bed. Patient was recently hospitalized at [facility] for UTI, PSVT. Pertinent PMH for this visit includes: Chronic pain, Parkinson's Disease, Fibromyalgia, UTIs, Depression, chronic indwelling waldrop cathter. Pt is Faroese Speaking. CG and son Trevin Michelle present for translation. Patient's current symptom complaint includes: Pain in her back and legs. Onset of symptoms: chronic but reports back pain is worse over the past 2 days as far as intensity. Pt also having SOB when laying flat (this in not new). No recent cough, fever, swelling, CP, wheezing. The following treatments have been implemented: [place] [address] [place], PT/OT with [facility], waiver/passport program in place. Pt has a medical bed in the home. Her  and son are main CGs in addition to passport assistance. Pt did stay at [facility] for 77 days from [address] [place] [address] as she was hospitalized in early Nov for UTI, encephalopathy. For her pain, pt is taking Tramadol 25mg TID and Gabapentin 300mg TID. She is given Tylenol prn as well. She is up most of the night according to her son and  yelling out about burning/shooting pain in her legs and needing repositioned. She is able to sit in a wheelchair with transfer assistance. She is mainly bedbound however. She is able to feed herself sometimes. There is no dysphagia. She tremors quite a bit in her UEs. She is incontinent of stool.  Today pt is quite uncomfortable, grimacing and anxious and at times squeezing her hands tightly around the side rails. She calms down with gentle touch and reassurance. She is clear when stating to her son and  in Serbian that she does not want to return to the hospital or NH. She gets very upset about this topic. Past Medical History:   Diagnosis Date    Depression     Fatigue     Fibromyalgia     Hyperlipidemia     Hypertension     Hypothyroid     Levodopa-induced dyskinesia     Lumbago     Muscular wasting and disuse atrophy     Myalgia     Osteoarthritis     Paralysis agitans ([address]     Parkinson's disease ([address]     Sepsis ([address] 11/24/2017    Spinal stenosis     Thyroid disease     Urinary frequency      No past surgical history on file. Social History     Social History    Marital status:      Spouse name: N/A    Number of children: N/A    Years of education: N/A     Occupational History    Not on file. Social History Main Topics    Smoking status: Never Smoker    Smokeless tobacco: Never Used    Alcohol use No    Drug use: No    Sexual activity: Not on file     Other Topics Concern    Not on file     Social History Narrative    [employer] [facility] [employer] [facility]    Waiver 8hrs 10am-6pm M-F, Sat 3 hrs     No family history on file. Allergies   Allergen Reactions    Latex     Penicillins     Vancomycin      Current Outpatient Prescriptions on File Prior to Visit   Medication Sig Dispense Refill    amiodarone (CORDARONE) 200 MG tablet Take 1 tablet by mouth daily 30 tablet 3    lisinopril (PRINIVIL;ZESTRIL) 5 MG tablet Take 1 tablet by mouth daily 30 tablet 3    lactulose (CHRONULAC) 10 GM/15ML solution Take 15 mLs by mouth every evening 1 Bottle 0    albuterol sulfate HFA (VENTOLIN HFA) 108 (90 Base) MCG/ACT inhaler Inhale 2 puffs into the lungs every 6 hours as needed for Wheezing 1 Inhaler 3    tiZANidine (ZANAFLEX) 4 MG tablet Take 1 tablet by mouth nightly 20 tablet 0    dronabinol (MARINOL) 2.5 MG capsule Take 1 capsule by mouth 2 times daily (before meals) .  40 capsule 0    aspirin Generalized weakness   Psychiatric/Behavioral: Positive for agitation. Negative for behavioral problems, confusion, dysphoric mood, hallucinations, sleep disturbance and suicidal ideas. The patient is not nervous/anxious. Negative existential symptoms  Anxious today         Objective:   /74   Pulse 84   Temp 98.6 °F (37 °C) (Temporal)   Resp 16   SpO2 96%     Physical Exam   Constitutional: She is oriented to person, place, and time. She is cooperative. No distress. Chronically ill appearing   HENT:   Head: Normocephalic and atraumatic. Nose: Nose normal.   Mouth/Throat: Oropharynx is clear and moist.   Eyes: Conjunctivae are normal. No scleral icterus. Neck: Normal range of motion. Neck supple. No JVD present. No thyromegaly present. Cardiovascular: Normal rate, regular rhythm and intact distal pulses. Pulmonary/Chest: Effort normal and breath sounds normal. She has no wheezes. She exhibits no tenderness. Abdominal: Soft. Bowel sounds are normal. She exhibits no distension and no mass. There is tenderness. There is no rebound and no guarding. Genitourinary:   Genitourinary Comments: Trevizo catheter patent. Sediment noted in tubing.  + for Suprapubic tenderness   Musculoskeletal: She exhibits no tenderness. Limited ROM to Upper/Lower limbs  Foot drop bilaterally   Lymphadenopathy:     She has no cervical adenopathy. Neurological: She is alert and oriented to person, place, and time. Skin: Skin is warm and dry. No rash noted. Psychiatric:   A/O x 3. Anxious   Vitals reviewed. Assessment:      1. Other chronic pain  Progress toward goal:  Switch medication d/c tramadol. Add Norco. Increase evening dose of Gabapentin to 600mg and contin 300mg BID. Heat or ice to painful areas, whichever is preferred  Gentle ROM exercises  Call for uncontrolled pain    - HYDROcodone-acetaminophen (LORCET) 5-325 MG per tablet; Take 1 tablet by mouth 3 times daily for 30 days .  Take lowest dose possible to manage pain. Dispense: 90 tablet; Refill: 0  - gabapentin (NEURONTIN) 600 MG tablet; Take 1 tablet by mouth every evening for 30 days. Dispense: 30 tablet; Refill: 3    2. SOB (shortness of breath)  HON elevation at least 30-45 degrees at all times. Call for increased SOB, cough, sputum production, excessive fatigue, fever, chills, or myalgias  Gentle exercise as tolerated  Rinse out mouth after inhaler use. COPD Action plan reviewed  COPD ER PACK in place. Call prior to initiating      - doxycycline hyclate (VIBRAMYCIN) 100 MG capsule; Take 1 capsule by mouth 2 times daily for 10 days  Dispense: 20 capsule; Refill: 0  - albuterol sulfate HFA (VENTOLIN HFA) 108 (90 Base) MCG/ACT inhaler; Inhale 2 puffs into the lungs every 6 hours as needed for Wheezing  Dispense: 1 Inhaler; Refill: 3    3. Acute bilateral low back pain without sciatica  Order sent to [facility] to change waldrop catheter and obtain UA/CS  - Urine Reflex to Culture; Future    4. Age-related physical debility  PPS 40%  Reviewed safety and comfort measures    5. Advance Care Planning: We discussed activation of Living Will (LW), and [address] [place] [address]). We discussed Code Statuses at length: Full Code, DNRCC-A, [facility]. Patient wishes to remain Full Code. 6. Goals of Care:  I discussed patients disease process and goals of treatment. Patients goal is comfort and to receive care in the home. I discussed the palliative care philosophy in light of those goals. We talked about care options down the road, including hospice care should disease-modifying treatments pose more intolerable burdens than medical benefits as determined by the disease progression and patients expected quality of life.  I went over the changes that progressive disease states have brought to her life, offering much support and emphasizing the potential opportunities for personal growth and the coping skills that are inevitable in continued adjustment

## 2018-02-02 ENCOUNTER — OFFICE VISIT (OUTPATIENT)
Dept: CARDIOLOGY CLINIC | Age: 78
End: 2018-02-02
Payer: MEDICARE

## 2018-02-02 VITALS
SYSTOLIC BLOOD PRESSURE: 123 MMHG | OXYGEN SATURATION: 95 % | RESPIRATION RATE: 16 BRPM | HEART RATE: 80 BPM | TEMPERATURE: 98.6 F | HEIGHT: 62 IN | DIASTOLIC BLOOD PRESSURE: 63 MMHG

## 2018-02-02 DIAGNOSIS — R00.0 TACHYCARDIA: ICD-10-CM

## 2018-02-02 DIAGNOSIS — I47.1 PSVT (PAROXYSMAL SUPRAVENTRICULAR TACHYCARDIA) (HCC): ICD-10-CM

## 2018-02-02 DIAGNOSIS — I10 ESSENTIAL HYPERTENSION: ICD-10-CM

## 2018-02-02 DIAGNOSIS — R06.02 SOB (SHORTNESS OF BREATH): ICD-10-CM

## 2018-02-02 PROCEDURE — G8400 PT W/DXA NO RESULTS DOC: HCPCS | Performed by: INTERNAL MEDICINE

## 2018-02-02 PROCEDURE — 99214 OFFICE O/P EST MOD 30 MIN: CPT | Performed by: INTERNAL MEDICINE

## 2018-02-02 PROCEDURE — G8419 CALC BMI OUT NRM PARAM NOF/U: HCPCS | Performed by: INTERNAL MEDICINE

## 2018-02-02 PROCEDURE — 1123F ACP DISCUSS/DSCN MKR DOCD: CPT | Performed by: INTERNAL MEDICINE

## 2018-02-02 PROCEDURE — 4040F PNEUMOC VAC/ADMIN/RCVD: CPT | Performed by: INTERNAL MEDICINE

## 2018-02-02 PROCEDURE — G8428 CUR MEDS NOT DOCUMENT: HCPCS | Performed by: INTERNAL MEDICINE

## 2018-02-02 PROCEDURE — 1090F PRES/ABSN URINE INCON ASSESS: CPT | Performed by: INTERNAL MEDICINE

## 2018-02-02 PROCEDURE — 93000 ELECTROCARDIOGRAM COMPLETE: CPT | Performed by: INTERNAL MEDICINE

## 2018-02-02 PROCEDURE — 1111F DSCHRG MED/CURRENT MED MERGE: CPT | Performed by: INTERNAL MEDICINE

## 2018-02-02 PROCEDURE — 1036F TOBACCO NON-USER: CPT | Performed by: INTERNAL MEDICINE

## 2018-02-02 PROCEDURE — G8484 FLU IMMUNIZE NO ADMIN: HCPCS | Performed by: INTERNAL MEDICINE

## 2018-02-02 RX ORDER — AMIODARONE HYDROCHLORIDE 200 MG/1
200 TABLET ORAL DAILY
Qty: 30 TABLET | Refills: 5 | Status: SHIPPED | OUTPATIENT
Start: 2018-02-02

## 2018-02-02 ASSESSMENT — ENCOUNTER SYMPTOMS
RESPIRATORY NEGATIVE: 1
NAUSEA: 0
CHEST TIGHTNESS: 0
BLOOD IN STOOL: 0
GASTROINTESTINAL NEGATIVE: 1
STRIDOR: 0
WHEEZING: 0
COUGH: 0
SHORTNESS OF BREATH: 0
EYES NEGATIVE: 1

## 2018-02-02 NOTE — PROGRESS NOTES
01/03/2018    TRIG 113 09/21/2015    TRIG 155 01/23/2014     Lab Results   Component Value Date    HDL 58 01/03/2018    HDL 62 (H) 09/21/2015    HDL 63 01/23/2014     Lab Results   Component Value Date    LDLCALC 108 01/03/2018    LDLCALC 114 09/21/2015    LDLCALC 94 01/23/2014     No results found for: LABVLDL, VLDL  No results found for: CHOLHDLRATIO  CMP:    Lab Results   Component Value Date     01/29/2018    K 3.9 01/29/2018     01/29/2018    CO2 30 01/29/2018    BUN 13 01/29/2018    CREATININE 0.72 01/29/2018    GFRAA >60.0 01/29/2018    LABGLOM >60.0 01/29/2018    GLUCOSE 98 01/29/2018    PROT 7.4 01/02/2018    LABALBU 4.4 01/29/2018    CALCIUM 10.2 01/29/2018    BILITOT 0.3 01/02/2018    ALKPHOS 92 01/02/2018    AST 19 01/02/2018    ALT 6 01/02/2018     BMP:    Lab Results   Component Value Date     01/29/2018    K 3.9 01/29/2018     01/29/2018    CO2 30 01/29/2018    BUN 13 01/29/2018    LABALBU 4.4 01/29/2018    CREATININE 0.72 01/29/2018    CALCIUM 10.2 01/29/2018    GFRAA >60.0 01/29/2018    LABGLOM >60.0 01/29/2018    GLUCOSE 98 01/29/2018     Magnesium:    Lab Results   Component Value Date    MG 2.0 01/03/2018     TSH:  Lab Results   Component Value Date    TSH 0.358 11/01/2017       Patient Active Problem List   Diagnosis    Hyperglycemia    Urinary retention    Altered mental status    PSVT (paroxysmal supraventricular tachycardia) (HCC)    Hypothyroidism    Parkinson disease (HCC)    Fibromyalgia    Essential hypertension    Tachycardia    Acute cystitis without hematuria    SOB (shortness of breath)    Chronic pain    Age-related physical debility       Assessment/Plan:    1. Tachycardia     - EKG 12 Lead    2. PSVT (paroxysmal supraventricular tachycardia) (HCC)  Stable on AMiodarone conr=tinue  - EKG 12 Lead    3. SOB (shortness of breath)       4.  Essential hypertension     - EKG 12 Lead       Counseling:  Heart Healthy Lifestyle, Low Salt Diet and Take

## 2018-02-06 ENCOUNTER — OFFICE VISIT (OUTPATIENT)
Dept: PALLATIVE CARE | Age: 78
End: 2018-02-06
Payer: MEDICARE

## 2018-02-06 VITALS
WEIGHT: 132 LBS | BODY MASS INDEX: 24.14 KG/M2 | SYSTOLIC BLOOD PRESSURE: 111 MMHG | RESPIRATION RATE: 16 BRPM | DIASTOLIC BLOOD PRESSURE: 66 MMHG | HEART RATE: 95 BPM

## 2018-02-06 DIAGNOSIS — R63.0 ANOREXIA: ICD-10-CM

## 2018-02-06 DIAGNOSIS — F41.9 ANXIETY: Primary | ICD-10-CM

## 2018-02-06 DIAGNOSIS — R21 RASH: ICD-10-CM

## 2018-02-06 DIAGNOSIS — G89.29 OTHER CHRONIC PAIN: ICD-10-CM

## 2018-02-06 PROCEDURE — 99348 HOME/RES VST EST LOW MDM 30: CPT | Performed by: NURSE PRACTITIONER

## 2018-02-06 RX ORDER — ALPRAZOLAM 0.5 MG/1
0.5 TABLET ORAL EVERY 8 HOURS PRN
COMMUNITY
End: 2018-02-06 | Stop reason: SDUPTHER

## 2018-02-06 RX ORDER — HYDROCODONE BITARTRATE AND ACETAMINOPHEN 5; 325 MG/1; MG/1
1 TABLET ORAL 3 TIMES DAILY
Qty: 90 TABLET | Refills: 0 | Status: SHIPPED | OUTPATIENT
Start: 2018-02-06 | End: 2018-03-08

## 2018-02-06 RX ORDER — ALPRAZOLAM 0.5 MG/1
0.5 TABLET ORAL EVERY 8 HOURS PRN
Qty: 90 TABLET | Refills: 3 | Status: SHIPPED | OUTPATIENT
Start: 2018-02-06 | End: 2018-02-14 | Stop reason: ALTCHOICE

## 2018-02-06 RX ORDER — DRONABINOL 2.5 MG/1
2.5 CAPSULE ORAL
Qty: 60 CAPSULE | Refills: 0 | Status: SHIPPED | OUTPATIENT
Start: 2018-02-06 | End: 2018-03-08

## 2018-02-06 ASSESSMENT — ENCOUNTER SYMPTOMS
CHOKING: 0
ABDOMINAL DISTENTION: 0
BLOOD IN STOOL: 0
SORE THROAT: 0
DIARRHEA: 0
VOMITING: 0
ABDOMINAL PAIN: 0
SHORTNESS OF BREATH: 0
COUGH: 0
CONSTIPATION: 0
TROUBLE SWALLOWING: 0
CHEST TIGHTNESS: 0
NAUSEA: 0
WHEEZING: 0
COLOR CHANGE: 0

## 2018-02-07 ENCOUNTER — OFFICE VISIT (OUTPATIENT)
Dept: ENDOCRINOLOGY | Age: 78
End: 2018-02-07
Payer: MEDICARE

## 2018-02-07 VITALS — HEART RATE: 80 BPM | SYSTOLIC BLOOD PRESSURE: 124 MMHG | DIASTOLIC BLOOD PRESSURE: 62 MMHG

## 2018-02-07 DIAGNOSIS — E03.9 HYPOTHYROIDISM, UNSPECIFIED TYPE: Primary | ICD-10-CM

## 2018-02-07 DIAGNOSIS — E03.9 HYPOTHYROIDISM, UNSPECIFIED TYPE: ICD-10-CM

## 2018-02-07 LAB
T4 FREE: 2 NG/DL (ref 0.93–1.7)
TSH SERPL DL<=0.05 MIU/L-ACNC: 0.09 UIU/ML (ref 0.27–4.2)

## 2018-02-07 PROCEDURE — 1036F TOBACCO NON-USER: CPT | Performed by: INTERNAL MEDICINE

## 2018-02-07 PROCEDURE — G8420 CALC BMI NORM PARAMETERS: HCPCS | Performed by: INTERNAL MEDICINE

## 2018-02-07 PROCEDURE — 1090F PRES/ABSN URINE INCON ASSESS: CPT | Performed by: INTERNAL MEDICINE

## 2018-02-07 PROCEDURE — G8427 DOCREV CUR MEDS BY ELIG CLIN: HCPCS | Performed by: INTERNAL MEDICINE

## 2018-02-07 PROCEDURE — 4040F PNEUMOC VAC/ADMIN/RCVD: CPT | Performed by: INTERNAL MEDICINE

## 2018-02-07 PROCEDURE — 1123F ACP DISCUSS/DSCN MKR DOCD: CPT | Performed by: INTERNAL MEDICINE

## 2018-02-07 PROCEDURE — 99213 OFFICE O/P EST LOW 20 MIN: CPT | Performed by: INTERNAL MEDICINE

## 2018-02-07 PROCEDURE — G8484 FLU IMMUNIZE NO ADMIN: HCPCS | Performed by: INTERNAL MEDICINE

## 2018-02-07 PROCEDURE — 1111F DSCHRG MED/CURRENT MED MERGE: CPT | Performed by: INTERNAL MEDICINE

## 2018-02-07 PROCEDURE — G8400 PT W/DXA NO RESULTS DOC: HCPCS | Performed by: INTERNAL MEDICINE

## 2018-02-07 NOTE — PROGRESS NOTES
tablet, Take 1 tablet by mouth daily, Disp: 30 tablet, Rfl: 3    lactulose (CHRONULAC) 10 GM/15ML solution, Take 15 mLs by mouth every evening, Disp: 1 Bottle, Rfl: 0    albuterol sulfate HFA (VENTOLIN HFA) 108 (90 Base) MCG/ACT inhaler, Inhale 2 puffs into the lungs every 6 hours as needed for Wheezing, Disp: 1 Inhaler, Rfl: 3    tiZANidine (ZANAFLEX) 4 MG tablet, Take 1 tablet by mouth nightly, Disp: 20 tablet, Rfl: 0    aspirin 81 MG tablet, Take 81 mg by mouth daily, Disp: , Rfl:     busPIRone (BUSPAR) 10 MG tablet, Take 10 mg by mouth nightly, Disp: , Rfl:     calcium carbonate (OSCAL) 500 MG TABS tablet, Take 500 mg by mouth 2 times daily, Disp: , Rfl:     carbidopa-levodopa (SINEMET)  MG per tablet, Take 1 tablet by mouth 3 times daily, Disp: , Rfl:     levothyroxine (SYNTHROID) 50 MCG tablet, Take 50 mcg by mouth Daily, Disp: , Rfl:     metoprolol tartrate (LOPRESSOR) 50 MG tablet, Take 50 mg by mouth 2 times daily, Disp: , Rfl:     rotigotine (NEUPRO) 2 MG/24HR, Place 1 patch onto the skin daily, Disp: , Rfl:     omeprazole (PRILOSEC) 20 MG delayed release capsule, Take 40 mg by mouth daily, Disp: , Rfl:     PARoxetine (PAXIL) 40 MG tablet, Take 40 mg by mouth every morning, Disp: , Rfl:     pramipexole (MIRAPEX) 0.5 MG tablet, Take 0.5 mg by mouth 3 times daily, Disp: , Rfl:     sucralfate (CARAFATE) 1 GM tablet, Take 1 g by mouth 4 times daily, Disp: , Rfl:     Cholecalciferol (VITAMIN D3) 2000 units TABS, Take by mouth every morning, Disp: , Rfl:     Review of Systems   Constitutional: Positive for fatigue. Neurological: Positive for tremors. All other systems reviewed and are negative. Vitals:    02/07/18 1014   BP: 124/62   Site: Right Arm   Position: Sitting   Cuff Size: Medium Adult   Pulse: 80       Objective:   Physical Exam   Constitutional: She appears well-developed and well-nourished. HENT:   Head: Normocephalic and atraumatic. Neck: Neck supple.

## 2018-02-14 ENCOUNTER — HOSPITAL ENCOUNTER (INPATIENT)
Age: 78
LOS: 5 days | Discharge: PSYCHIATRIC OR SUBSTANCE USE FACILITY | DRG: 871 | End: 2018-02-20
Attending: EMERGENCY MEDICINE | Admitting: INTERNAL MEDICINE
Payer: MEDICARE

## 2018-02-14 ENCOUNTER — APPOINTMENT (OUTPATIENT)
Dept: GENERAL RADIOLOGY | Age: 78
DRG: 871 | End: 2018-02-14
Payer: MEDICARE

## 2018-02-14 ENCOUNTER — TELEPHONE (OUTPATIENT)
Dept: PALLATIVE CARE | Age: 78
End: 2018-02-14

## 2018-02-14 ENCOUNTER — APPOINTMENT (OUTPATIENT)
Dept: CT IMAGING | Age: 78
DRG: 871 | End: 2018-02-14
Payer: MEDICARE

## 2018-02-14 DIAGNOSIS — R77.8 ELEVATED TROPONIN: ICD-10-CM

## 2018-02-14 DIAGNOSIS — F41.9 ANXIETY: ICD-10-CM

## 2018-02-14 DIAGNOSIS — R41.82 ALTERED MENTAL STATUS, UNSPECIFIED ALTERED MENTAL STATUS TYPE: Primary | ICD-10-CM

## 2018-02-14 DIAGNOSIS — R41.0 DELIRIUM: Primary | ICD-10-CM

## 2018-02-14 LAB
ALBUMIN SERPL-MCNC: 3.5 G/DL (ref 3.9–4.9)
ALP BLD-CCNC: 541 U/L (ref 40–130)
ALT SERPL-CCNC: 30 U/L (ref 0–33)
ANION GAP SERPL CALCULATED.3IONS-SCNC: 11 MEQ/L (ref 7–13)
APTT: 26.6 SEC (ref 21.6–35.4)
AST SERPL-CCNC: 210 U/L (ref 0–35)
BASOPHILS ABSOLUTE: 0 K/UL (ref 0–0.2)
BASOPHILS RELATIVE PERCENT: 0.4 %
BILIRUB SERPL-MCNC: 0.3 MG/DL (ref 0–1.2)
BILIRUBIN URINE: NEGATIVE
BLOOD, URINE: ABNORMAL
BUN BLDV-MCNC: 15 MG/DL (ref 8–23)
CALCIUM SERPL-MCNC: 8.7 MG/DL (ref 8.6–10.2)
CHLORIDE BLD-SCNC: 105 MEQ/L (ref 98–107)
CLARITY: ABNORMAL
CO2: 26 MEQ/L (ref 22–29)
COLOR: ABNORMAL
CREAT SERPL-MCNC: 0.6 MG/DL (ref 0.5–0.9)
EOSINOPHILS ABSOLUTE: 0.1 K/UL (ref 0–0.7)
EOSINOPHILS RELATIVE PERCENT: 1.4 %
GFR AFRICAN AMERICAN: >60
GFR NON-AFRICAN AMERICAN: >60
GLOBULIN: 2.5 G/DL (ref 2.3–3.5)
GLUCOSE BLD-MCNC: 82 MG/DL (ref 74–109)
GLUCOSE URINE: NEGATIVE MG/DL
HCT VFR BLD CALC: 38.7 % (ref 37–47)
HEMOGLOBIN: 12.4 G/DL (ref 12–16)
INR BLD: 1
KETONES, URINE: NEGATIVE MG/DL
LEUKOCYTE ESTERASE, URINE: ABNORMAL
LYMPHOCYTES ABSOLUTE: 1.3 K/UL (ref 1–4.8)
LYMPHOCYTES RELATIVE PERCENT: 17.1 %
MCH RBC QN AUTO: 30.3 PG (ref 27–31.3)
MCHC RBC AUTO-ENTMCNC: 32 % (ref 33–37)
MCV RBC AUTO: 94.8 FL (ref 82–100)
MONOCYTES ABSOLUTE: 0.6 K/UL (ref 0.2–0.8)
MONOCYTES RELATIVE PERCENT: 8 %
NEUTROPHILS ABSOLUTE: 5.4 K/UL (ref 1.4–6.5)
NEUTROPHILS RELATIVE PERCENT: 73.1 %
NITRITE, URINE: NEGATIVE
PDW BLD-RTO: 13.3 % (ref 11.5–14.5)
PH UA: 6.5 (ref 5–9)
PLATELET # BLD: 220 K/UL (ref 130–400)
POTASSIUM SERPL-SCNC: 4.1 MEQ/L (ref 3.5–5.1)
PROTEIN UA: >=300 MG/DL
PROTHROMBIN TIME: 10.1 SEC (ref 8.1–13.7)
RBC # BLD: 4.08 M/UL (ref 4.2–5.4)
RBC UA: >100 /HPF (ref 0–2)
SODIUM BLD-SCNC: 142 MEQ/L (ref 132–144)
SPECIFIC GRAVITY UA: 1.02 (ref 1–1.03)
T3 FREE: 3.6 PG/ML (ref 2–4.4)
T4 FREE: 1.91 NG/DL (ref 0.93–1.7)
TOTAL CK: 105 U/L (ref 0–170)
TOTAL PROTEIN: 6 G/DL (ref 6.4–8.1)
TROPONIN: 0.03 NG/ML (ref 0–0.01)
TROPONIN: 0.06 NG/ML (ref 0–0.01)
TSH SERPL DL<=0.05 MIU/L-ACNC: 0.06 UIU/ML (ref 0.27–4.2)
URINE REFLEX TO CULTURE: YES
UROBILINOGEN, URINE: 1 E.U./DL
WBC # BLD: 7.4 K/UL (ref 4.8–10.8)
WBC UA: ABNORMAL /HPF (ref 0–5)

## 2018-02-14 PROCEDURE — 6360000002 HC RX W HCPCS: Performed by: EMERGENCY MEDICINE

## 2018-02-14 PROCEDURE — 6370000000 HC RX 637 (ALT 250 FOR IP): Performed by: EMERGENCY MEDICINE

## 2018-02-14 PROCEDURE — 84481 FREE ASSAY (FT-3): CPT

## 2018-02-14 PROCEDURE — 96374 THER/PROPH/DIAG INJ IV PUSH: CPT

## 2018-02-14 PROCEDURE — 82550 ASSAY OF CK (CPK): CPT

## 2018-02-14 PROCEDURE — G0378 HOSPITAL OBSERVATION PER HR: HCPCS

## 2018-02-14 PROCEDURE — 70450 CT HEAD/BRAIN W/O DYE: CPT

## 2018-02-14 PROCEDURE — 99285 EMERGENCY DEPT VISIT HI MDM: CPT

## 2018-02-14 PROCEDURE — 87086 URINE CULTURE/COLONY COUNT: CPT

## 2018-02-14 PROCEDURE — 85025 COMPLETE CBC W/AUTO DIFF WBC: CPT

## 2018-02-14 PROCEDURE — 81001 URINALYSIS AUTO W/SCOPE: CPT

## 2018-02-14 PROCEDURE — 87040 BLOOD CULTURE FOR BACTERIA: CPT

## 2018-02-14 PROCEDURE — 84484 ASSAY OF TROPONIN QUANT: CPT

## 2018-02-14 PROCEDURE — 80053 COMPREHEN METABOLIC PANEL: CPT

## 2018-02-14 PROCEDURE — 81003 URINALYSIS AUTO W/O SCOPE: CPT

## 2018-02-14 PROCEDURE — 36415 COLL VENOUS BLD VENIPUNCTURE: CPT

## 2018-02-14 PROCEDURE — 84443 ASSAY THYROID STIM HORMONE: CPT

## 2018-02-14 PROCEDURE — 93005 ELECTROCARDIOGRAM TRACING: CPT

## 2018-02-14 PROCEDURE — 85610 PROTHROMBIN TIME: CPT

## 2018-02-14 PROCEDURE — 85730 THROMBOPLASTIN TIME PARTIAL: CPT

## 2018-02-14 PROCEDURE — 84439 ASSAY OF FREE THYROXINE: CPT

## 2018-02-14 PROCEDURE — 71045 X-RAY EXAM CHEST 1 VIEW: CPT

## 2018-02-14 RX ORDER — SODIUM CHLORIDE 9 MG/ML
INJECTION, SOLUTION INTRAVENOUS CONTINUOUS
Status: DISCONTINUED | OUTPATIENT
Start: 2018-02-14 | End: 2018-02-15

## 2018-02-14 RX ORDER — LORAZEPAM 0.5 MG/1
0.5 TABLET ORAL EVERY 4 HOURS PRN
Qty: 60 TABLET | Refills: 0 | Status: SHIPPED | OUTPATIENT
Start: 2018-02-14 | End: 2018-02-14

## 2018-02-14 RX ORDER — SODIUM CHLORIDE 0.9 % (FLUSH) 0.9 %
10 SYRINGE (ML) INJECTION PRN
Status: DISCONTINUED | OUTPATIENT
Start: 2018-02-14 | End: 2018-02-20 | Stop reason: HOSPADM

## 2018-02-14 RX ORDER — ONDANSETRON 2 MG/ML
4 INJECTION INTRAMUSCULAR; INTRAVENOUS EVERY 6 HOURS PRN
Status: DISCONTINUED | OUTPATIENT
Start: 2018-02-14 | End: 2018-02-20 | Stop reason: HOSPADM

## 2018-02-14 RX ORDER — LORAZEPAM 2 MG/ML
0.5 INJECTION INTRAMUSCULAR ONCE
Status: COMPLETED | OUTPATIENT
Start: 2018-02-14 | End: 2018-02-14

## 2018-02-14 RX ORDER — ACETAMINOPHEN 325 MG/1
650 TABLET ORAL EVERY 4 HOURS PRN
Status: DISCONTINUED | OUTPATIENT
Start: 2018-02-14 | End: 2018-02-20 | Stop reason: HOSPADM

## 2018-02-14 RX ORDER — ASPIRIN 81 MG/1
324 TABLET, CHEWABLE ORAL ONCE
Status: COMPLETED | OUTPATIENT
Start: 2018-02-14 | End: 2018-02-14

## 2018-02-14 RX ORDER — SODIUM CHLORIDE 0.9 % (FLUSH) 0.9 %
10 SYRINGE (ML) INJECTION EVERY 12 HOURS SCHEDULED
Status: DISCONTINUED | OUTPATIENT
Start: 2018-02-14 | End: 2018-02-20 | Stop reason: HOSPADM

## 2018-02-14 RX ADMIN — LORAZEPAM 0.5 MG: 2 INJECTION INTRAMUSCULAR; INTRAVENOUS at 17:43

## 2018-02-14 RX ADMIN — LORAZEPAM 0.5 MG: 2 INJECTION INTRAMUSCULAR; INTRAVENOUS at 17:17

## 2018-02-14 RX ADMIN — ASPIRIN 81 MG 324 MG: 81 TABLET ORAL at 18:37

## 2018-02-14 NOTE — ED PROVIDER NOTES
wasting and disuse atrophy     Myalgia     Osteoarthritis     Paralysis agitans ([facility] [address]     Parkinson's disease ([facility]     Sepsis ([facility] [address] 11/24/2017    Spinal stenosis     Thyroid disease     Urinary frequency          SURGICAL HISTORY     History reviewed. No pertinent surgical history. CURRENT MEDICATIONS       Previous Medications    ALBUTEROL SULFATE HFA (VENTOLIN HFA) 108 (90 BASE) MCG/ACT INHALER    Inhale 2 puffs into the lungs every 6 hours as needed for Wheezing    AMIODARONE (CORDARONE) 200 MG TABLET    Take 1 tablet by mouth daily    ASPIRIN 81 MG TABLET    Take 81 mg by mouth daily    BUSPIRONE (BUSPAR) 10 MG TABLET    Take 10 mg by mouth nightly    CALCIUM CARBONATE (OSCAL) 500 MG TABS TABLET    Take 500 mg by mouth 2 times daily    CARBIDOPA-LEVODOPA (SINEMET)  MG PER TABLET    Take 1 tablet by mouth 3 times daily    CHOLECALCIFEROL (VITAMIN D3) 2000 UNITS TABS    Take by mouth every morning    DRONABINOL (MARINOL) 2.5 MG CAPSULE    Take 1 capsule by mouth 2 times daily (before meals) for 30 days. GABAPENTIN (NEURONTIN) 300 MG CAPSULE    Take 1 capsule by mouth 2 times daily for 30 days. GABAPENTIN (NEURONTIN) 600 MG TABLET    Take 1 tablet by mouth every evening for 30 days. HYDROCODONE-ACETAMINOPHEN (LORCET) 5-325 MG PER TABLET    Take 1 tablet by mouth 3 times daily for 30 days . Take lowest dose possible to manage pain.     LACTULOSE (CHRONULAC) 10 GM/15ML SOLUTION    Take 15 mLs by mouth every evening    LEVOTHYROXINE (SYNTHROID) 50 MCG TABLET    Take 50 mcg by mouth Daily    LISINOPRIL (PRINIVIL;ZESTRIL) 5 MG TABLET    Take 1 tablet by mouth daily    METOPROLOL TARTRATE (LOPRESSOR) 50 MG TABLET    Take 50 mg by mouth 2 times daily    OMEPRAZOLE (PRILOSEC) 20 MG DELAYED RELEASE CAPSULE    Take 40 mg by mouth daily    PAROXETINE (PAXIL) 40 MG TABLET    Take 40 mg by mouth every morning    PRAMIPEXOLE (MIRAPEX) 0.5 MG TABLET    Take 0.5 mg by mouth 3 times daily    ROTIGOTINE

## 2018-02-14 NOTE — ED TRIAGE NOTES
Sent to ER by home health RN for increased agitation and confusion, Pt is agitated and restless on arrival, waldrop cath has red tinged urine, Pt denies pain, states she has a broken heart after her  left.

## 2018-02-14 NOTE — TELEPHONE ENCOUNTER
Call from [address] who is in the home. He states pt is combative and unable to calm her down. I can hear her yelling out. I advised him to have  or CG call 911 to have pt evaluated at [facility].  is agreeable with the plan.

## 2018-02-15 ENCOUNTER — APPOINTMENT (OUTPATIENT)
Dept: ULTRASOUND IMAGING | Age: 78
DRG: 871 | End: 2018-02-15
Payer: MEDICARE

## 2018-02-15 ENCOUNTER — TELEPHONE (OUTPATIENT)
Dept: ENDOCRINOLOGY | Age: 78
End: 2018-02-15

## 2018-02-15 PROBLEM — I47.1 SVT (SUPRAVENTRICULAR TACHYCARDIA) (HCC): Status: ACTIVE | Noted: 2018-02-15

## 2018-02-15 LAB
ANION GAP SERPL CALCULATED.3IONS-SCNC: 14 MEQ/L (ref 7–13)
BUN BLDV-MCNC: 11 MG/DL (ref 8–23)
CALCIUM SERPL-MCNC: 8.8 MG/DL (ref 8.6–10.2)
CHLORIDE BLD-SCNC: 106 MEQ/L (ref 98–107)
CO2: 25 MEQ/L (ref 22–29)
CREAT SERPL-MCNC: 0.62 MG/DL (ref 0.5–0.9)
GAMMA GLUTAMYL TRANSFERASE: 838 U/L (ref 0–40)
GFR AFRICAN AMERICAN: >60
GFR NON-AFRICAN AMERICAN: >60
GLUCOSE BLD-MCNC: 105 MG/DL (ref 74–109)
HCT VFR BLD CALC: 37.1 % (ref 37–47)
HEMOGLOBIN: 12 G/DL (ref 12–16)
MAGNESIUM: 2.1 MG/DL (ref 1.7–2.3)
MCH RBC QN AUTO: 30.5 PG (ref 27–31.3)
MCHC RBC AUTO-ENTMCNC: 32.3 % (ref 33–37)
MCV RBC AUTO: 94.5 FL (ref 82–100)
PDW BLD-RTO: 13.5 % (ref 11.5–14.5)
PLATELET # BLD: 200 K/UL (ref 130–400)
POTASSIUM REFLEX MAGNESIUM: 4.4 MEQ/L (ref 3.5–5.1)
RBC # BLD: 3.93 M/UL (ref 4.2–5.4)
SODIUM BLD-SCNC: 145 MEQ/L (ref 132–144)
TROPONIN: 0.01 NG/ML (ref 0–0.01)
TROPONIN: <0.01 NG/ML (ref 0–0.01)
TROPONIN: <0.01 NG/ML (ref 0–0.01)
WBC # BLD: 4.6 K/UL (ref 4.8–10.8)

## 2018-02-15 PROCEDURE — 99222 1ST HOSP IP/OBS MODERATE 55: CPT | Performed by: INTERNAL MEDICINE

## 2018-02-15 PROCEDURE — 80048 BASIC METABOLIC PNL TOTAL CA: CPT

## 2018-02-15 PROCEDURE — 51702 INSERT TEMP BLADDER CATH: CPT

## 2018-02-15 PROCEDURE — 2580000003 HC RX 258: Performed by: NURSE PRACTITIONER

## 2018-02-15 PROCEDURE — 96375 TX/PRO/DX INJ NEW DRUG ADDON: CPT

## 2018-02-15 PROCEDURE — 6360000002 HC RX W HCPCS: Performed by: NURSE PRACTITIONER

## 2018-02-15 PROCEDURE — 2500000003 HC RX 250 WO HCPCS: Performed by: NURSE PRACTITIONER

## 2018-02-15 PROCEDURE — 92610 EVALUATE SWALLOWING FUNCTION: CPT

## 2018-02-15 PROCEDURE — 93005 ELECTROCARDIOGRAM TRACING: CPT

## 2018-02-15 PROCEDURE — 2500000003 HC RX 250 WO HCPCS: Performed by: INTERNAL MEDICINE

## 2018-02-15 PROCEDURE — 80299 QUANTITATIVE ASSAY DRUG: CPT

## 2018-02-15 PROCEDURE — G8996 SWALLOW CURRENT STATUS: HCPCS

## 2018-02-15 PROCEDURE — 36415 COLL VENOUS BLD VENIPUNCTURE: CPT

## 2018-02-15 PROCEDURE — 84484 ASSAY OF TROPONIN QUANT: CPT

## 2018-02-15 PROCEDURE — 96376 TX/PRO/DX INJ SAME DRUG ADON: CPT

## 2018-02-15 PROCEDURE — 6370000000 HC RX 637 (ALT 250 FOR IP): Performed by: INTERNAL MEDICINE

## 2018-02-15 PROCEDURE — 85027 COMPLETE CBC AUTOMATED: CPT

## 2018-02-15 PROCEDURE — 82977 ASSAY OF GGT: CPT

## 2018-02-15 PROCEDURE — G8997 SWALLOW GOAL STATUS: HCPCS

## 2018-02-15 PROCEDURE — 2580000003 HC RX 258: Performed by: INTERNAL MEDICINE

## 2018-02-15 PROCEDURE — 76705 ECHO EXAM OF ABDOMEN: CPT

## 2018-02-15 PROCEDURE — 83735 ASSAY OF MAGNESIUM: CPT

## 2018-02-15 PROCEDURE — 6360000002 HC RX W HCPCS: Performed by: INTERNAL MEDICINE

## 2018-02-15 PROCEDURE — 2060000000 HC ICU INTERMEDIATE R&B

## 2018-02-15 RX ORDER — KETOROLAC TROMETHAMINE 15 MG/ML
15 INJECTION, SOLUTION INTRAMUSCULAR; INTRAVENOUS EVERY 6 HOURS PRN
Status: DISPENSED | OUTPATIENT
Start: 2018-02-15 | End: 2018-02-20

## 2018-02-15 RX ORDER — POTASSIUM CHLORIDE 7.45 MG/ML
10 INJECTION INTRAVENOUS PRN
Status: DISCONTINUED | OUTPATIENT
Start: 2018-02-15 | End: 2018-02-20 | Stop reason: HOSPADM

## 2018-02-15 RX ORDER — LORAZEPAM 2 MG/ML
0.5 INJECTION INTRAMUSCULAR EVERY 4 HOURS PRN
Status: DISCONTINUED | OUTPATIENT
Start: 2018-02-15 | End: 2018-02-20 | Stop reason: HOSPADM

## 2018-02-15 RX ORDER — METOPROLOL TARTRATE 5 MG/5ML
5 INJECTION INTRAVENOUS
Status: DISPENSED | OUTPATIENT
Start: 2018-02-15 | End: 2018-02-15

## 2018-02-15 RX ORDER — BUSPIRONE HYDROCHLORIDE 10 MG/1
10 TABLET ORAL NIGHTLY
Status: DISCONTINUED | OUTPATIENT
Start: 2018-02-15 | End: 2018-02-18

## 2018-02-15 RX ORDER — LEVOTHYROXINE SODIUM 0.05 MG/1
50 TABLET ORAL DAILY
Status: DISCONTINUED | OUTPATIENT
Start: 2018-02-15 | End: 2018-02-15

## 2018-02-15 RX ORDER — ASPIRIN 81 MG/1
81 TABLET, CHEWABLE ORAL DAILY
Status: DISCONTINUED | OUTPATIENT
Start: 2018-02-15 | End: 2018-02-20 | Stop reason: HOSPADM

## 2018-02-15 RX ORDER — PAROXETINE HYDROCHLORIDE 20 MG/1
40 TABLET, FILM COATED ORAL EVERY MORNING
Status: DISCONTINUED | OUTPATIENT
Start: 2018-02-15 | End: 2018-02-18

## 2018-02-15 RX ORDER — CIPROFLOXACIN 2 MG/ML
400 INJECTION, SOLUTION INTRAVENOUS EVERY 12 HOURS
Status: DISCONTINUED | OUTPATIENT
Start: 2018-02-15 | End: 2018-02-20 | Stop reason: HOSPADM

## 2018-02-15 RX ORDER — DEXTROSE AND SODIUM CHLORIDE 5; .45 G/100ML; G/100ML
INJECTION, SOLUTION INTRAVENOUS CONTINUOUS
Status: DISCONTINUED | OUTPATIENT
Start: 2018-02-15 | End: 2018-02-20 | Stop reason: HOSPADM

## 2018-02-15 RX ORDER — PHENAZOPYRIDINE HYDROCHLORIDE 200 MG/1
200 TABLET, FILM COATED ORAL
Status: DISPENSED | OUTPATIENT
Start: 2018-02-15 | End: 2018-02-16

## 2018-02-15 RX ORDER — MAGNESIUM SULFATE 1 G/100ML
1 INJECTION INTRAVENOUS PRN
Status: DISCONTINUED | OUTPATIENT
Start: 2018-02-15 | End: 2018-02-20 | Stop reason: HOSPADM

## 2018-02-15 RX ORDER — LISINOPRIL 5 MG/1
5 TABLET ORAL DAILY
Status: DISCONTINUED | OUTPATIENT
Start: 2018-02-15 | End: 2018-02-20 | Stop reason: HOSPADM

## 2018-02-15 RX ORDER — METOPROLOL TARTRATE 5 MG/5ML
5 INJECTION INTRAVENOUS EVERY 6 HOURS
Status: DISCONTINUED | OUTPATIENT
Start: 2018-02-15 | End: 2018-02-19

## 2018-02-15 RX ORDER — KETOROLAC TROMETHAMINE 15 MG/ML
15 INJECTION, SOLUTION INTRAMUSCULAR; INTRAVENOUS ONCE
Status: COMPLETED | OUTPATIENT
Start: 2018-02-15 | End: 2018-02-15

## 2018-02-15 RX ORDER — METOPROLOL TARTRATE 5 MG/5ML
2.5 INJECTION INTRAVENOUS ONCE
Status: COMPLETED | OUTPATIENT
Start: 2018-02-15 | End: 2018-02-15

## 2018-02-15 RX ORDER — AMIODARONE HYDROCHLORIDE 200 MG/1
200 TABLET ORAL DAILY
Status: DISCONTINUED | OUTPATIENT
Start: 2018-02-15 | End: 2018-02-20 | Stop reason: HOSPADM

## 2018-02-15 RX ORDER — METOPROLOL TARTRATE 50 MG/1
100 TABLET, FILM COATED ORAL 2 TIMES DAILY
Status: DISCONTINUED | OUTPATIENT
Start: 2018-02-15 | End: 2018-02-15

## 2018-02-15 RX ORDER — MORPHINE SULFATE 2 MG/ML
2 INJECTION, SOLUTION INTRAMUSCULAR; INTRAVENOUS EVERY 4 HOURS PRN
Status: DISCONTINUED | OUTPATIENT
Start: 2018-02-15 | End: 2018-02-20 | Stop reason: HOSPADM

## 2018-02-15 RX ORDER — METOPROLOL TARTRATE 5 MG/5ML
5 INJECTION INTRAVENOUS ONCE
Status: COMPLETED | OUTPATIENT
Start: 2018-02-15 | End: 2018-02-15

## 2018-02-15 RX ORDER — METOPROLOL TARTRATE 50 MG/1
50 TABLET, FILM COATED ORAL 2 TIMES DAILY
Status: DISCONTINUED | OUTPATIENT
Start: 2018-02-15 | End: 2018-02-15

## 2018-02-15 RX ADMIN — METOPROLOL TARTRATE 5 MG: 5 INJECTION, SOLUTION INTRAVENOUS at 19:46

## 2018-02-15 RX ADMIN — METOPROLOL TARTRATE 5 MG: 5 INJECTION, SOLUTION INTRAVENOUS at 12:21

## 2018-02-15 RX ADMIN — METOPROLOL TARTRATE 2.5 MG: 5 INJECTION, SOLUTION INTRAVENOUS at 03:13

## 2018-02-15 RX ADMIN — METOPROLOL TARTRATE 2.5 MG: 5 INJECTION, SOLUTION INTRAVENOUS at 06:39

## 2018-02-15 RX ADMIN — Medication 10 ML: at 00:31

## 2018-02-15 RX ADMIN — BUSPIRONE HYDROCHLORIDE 10 MG: 10 TABLET ORAL at 19:45

## 2018-02-15 RX ADMIN — SODIUM CHLORIDE: 9 INJECTION, SOLUTION INTRAVENOUS at 00:30

## 2018-02-15 RX ADMIN — Medication 2 MG: at 19:46

## 2018-02-15 RX ADMIN — Medication 10 ML: at 21:03

## 2018-02-15 RX ADMIN — CIPROFLOXACIN 400 MG: 2 INJECTION, SOLUTION INTRAVENOUS at 21:03

## 2018-02-15 RX ADMIN — CEFTRIAXONE SODIUM 1 G: 10 INJECTION, POWDER, FOR SOLUTION INTRAVENOUS at 13:30

## 2018-02-15 RX ADMIN — METOPROLOL TARTRATE 5 MG: 5 INJECTION, SOLUTION INTRAVENOUS at 08:28

## 2018-02-15 RX ADMIN — LORAZEPAM 0.5 MG: 2 INJECTION INTRAMUSCULAR; INTRAVENOUS at 19:46

## 2018-02-15 RX ADMIN — KETOROLAC TROMETHAMINE 15 MG: 15 INJECTION, SOLUTION INTRAMUSCULAR; INTRAVENOUS at 11:53

## 2018-02-15 RX ADMIN — CARBIDOPA AND LEVODOPA 1 TABLET: 25; 100 TABLET ORAL at 19:45

## 2018-02-15 RX ADMIN — KETOROLAC TROMETHAMINE 15 MG: 15 INJECTION, SOLUTION INTRAMUSCULAR; INTRAVENOUS at 04:39

## 2018-02-15 RX ADMIN — LORAZEPAM 0.5 MG: 2 INJECTION INTRAMUSCULAR; INTRAVENOUS at 11:53

## 2018-02-15 RX ADMIN — ENOXAPARIN SODIUM 40 MG: 40 INJECTION SUBCUTANEOUS at 11:53

## 2018-02-15 RX ADMIN — DEXTROSE AND SODIUM CHLORIDE: 5; 450 INJECTION, SOLUTION INTRAVENOUS at 11:53

## 2018-02-15 ASSESSMENT — PAIN DESCRIPTION - FREQUENCY: FREQUENCY: CONTINUOUS

## 2018-02-15 ASSESSMENT — PAIN DESCRIPTION - LOCATION: LOCATION: ABDOMEN;BACK

## 2018-02-15 ASSESSMENT — PAIN SCALES - GENERAL
PAINLEVEL_OUTOF10: 8
PAINLEVEL_OUTOF10: 0
PAINLEVEL_OUTOF10: 8
PAINLEVEL_OUTOF10: 0
PAINLEVEL_OUTOF10: 2
PAINLEVEL_OUTOF10: 0
PAINLEVEL_OUTOF10: 0
PAINLEVEL_OUTOF10: 7

## 2018-02-15 ASSESSMENT — PAIN DESCRIPTION - ONSET: ONSET: ON-GOING

## 2018-02-15 ASSESSMENT — PAIN DESCRIPTION - PAIN TYPE: TYPE: ACUTE PAIN

## 2018-02-15 ASSESSMENT — PAIN DESCRIPTION - PROGRESSION: CLINICAL_PROGRESSION: NOT CHANGED

## 2018-02-15 NOTE — PROGRESS NOTES
Speech Language Pathology  Facility/Department: [facility] 4X TELEMETRY   BEDSIDE SWALLOW EVALUATION    NAME: Frida Epperson  : 1940  MRN: [id number]    ADMISSION DATE: 2018  ADMITTING DIAGNOSIS: has Hyperglycemia; Urinary retention; Altered mental status; PSVT (paroxysmal supraventricular tachycardia) ([facility] [address] Hypothyroidism; Parkinson disease ([facility] [address] Fibromyalgia; Essential hypertension; Tachycardia; Acute cystitis without hematuria; SOB (shortness of breath); Chronic pain; Age-related physical debility; Altered mental state; and SVT (supraventricular tachycardia) ([facility] [address] on her problem list.  ONSET DATE: 2-15-18      Date of Eval: 2/15/2018  Evaluating Therapist: Taryn Echavarria M.A. CCC-SLP    Current Diet level:  Current Diet : Regular  Current Liquid Diet : Thin      Pain:  Pain Assessment  Patient Currently in Pain: No  Pain Assessment: 0-10  Pain Level: 2  Response to Pain Intervention: Patient Satisfied    Reason for Referral  Frida Epperson was referred for a bedside swallow evaluation to assess the efficiency of her swallow function, identify signs and symptoms of aspiration and make recommendations regarding safe dietary consistencies, effective compensatory strategies, and safe eating environment. Impression  Dysphagia Diagnosis: Mild to moderate oral stage dysphagia;Mild pharyngeal stage dysphagia  Dysphagia Outcome Severity Scale: Level 4: Mild moderate dysphagia- Intermittent supervision/cueing. One - two diet consistencies restricted     Treatment Plan  Requires SLP Intervention: Yes  Duration/Frequency of Treatment: 1-2 x week, 10-15 minutes          Recommended Diet and Intervention  Diet Solids Recommendation: Dysphagia II Mechanically Altered  Liquid Consistency Recommendation:  Thin     Therapeutic Interventions: Diet tolerance monitoring, Oral motor exercises, Tongue base strengthening    Compensatory Swallowing Strategies  Compensatory Swallowing Strategies: Assist good  Individuals consulted  Consulted and agree with results and recommendations: PLACIDO Nuñez)    Education  Patient Education:  phones not working at this time. Informed Luis Fernando Farah RN of diet recommendations. G-Code  SLP G-Codes  Functional Limitations: Swallowing  Swallow Current Status (): At least 20 percent but less than 40 percent impaired, limited or restricted  Swallow Goal Status ():  At least 20 percent but less than 40 percent impaired, limited or restricted         Therapy Time  SLP Individual Minutes  Time In: 1520  Time Out: [place]  Minutes:  [facility]  2/15/2018 3:41 PM

## 2018-02-15 NOTE — PROGRESS NOTES
Patient arrived from 9W. Remains confused and tearful. HR is stable so I called ultrasound and let them know she is ok to come down for her ordered test. PCA remains at bedside as a one on one for safety and because patient pulls lines and tubes.  Electronically signed by Chucho Becerril RN on 2/15/2018 at 10:24 AM

## 2018-02-15 NOTE — CONSULTS
Consult Note  Patient: Bhargavi Rueda  Unit/Bed: H871/P819-78  YOB: 1940  MRN: [id number]  Acct: [de-identified]   Admitting Diagnosis: Altered mental state [R41.82]  Altered mental status [R41.82]  SVT (supraventricular tachycardia) ([facility] [address] [I47.1]  Admit Date:  2/14/2018  Hospital Day: 0      Chief Complaint:  Change in mental status    History of Present Illness:  81-year-old female who is a very poor historian but very well-known to our cardiac service came to the emergency department for complaint of change in mental status. She is Syriac speaking we had seen her in the past she was just recently seen by Dr. Nesha Mathtew in the office on 2-18. Her last echo ejection fraction was normal.  Her SVT was 8 AM this morning last for short period of time due to her mental status change she's really unable to answer any questions at times she was symptomatic or not. PSVT/VT- had family conference with Son Bridger Caceres medical management. No plans for invasive CV measures. 10/2018 Echo 65% RVSP 41  Subjective/HPI; Here with a trtanslator. oding fine. Eating well. No cp. Breathing ok. No falls no bleed. takin gmeds. No palpitations. PMHx:  Past Medical History:   Diagnosis Date    Depression     Fatigue     Fibromyalgia     Hyperlipidemia     Hypertension     Hypothyroid     Levodopa-induced dyskinesia     Lumbago     Muscular wasting and disuse atrophy     Myalgia     Osteoarthritis     Paralysis agitans ([facility] [address]     Parkinson's disease ([facility] [address]     Sepsis ([facility] [address] 11/24/2017    Spinal stenosis     Thyroid disease     Urinary frequency        PSHx:  History reviewed. No pertinent surgical history.     Social Hx:  Social History     Social History    Marital status:      Spouse name: N/A    Number of children: N/A    Years of education: N/A     Social History Main Topics    Smoking status: Never Smoker    Smokeless tobacco: Never Used    Alcohol use No    Drug use: No    Sexual activity: Not Asked     Other Topics Concern    None     Social History Narrative    [employer] [facility] [employer]    Waiver 8hrs 10am-6pm M-F, Sat 3 hrs       Family Hx:  Family History   Problem Relation Age of Onset    Heart Disease Mother     Heart Disease Father        Review of Systems:   Review of Systems   Unable to perform ROS: Mental status change         Physical Examination:    BP (!) 142/84   Pulse 95   Temp 98.4 °F (36.9 °C)   Resp 22   Wt 132 lb (59.9 kg)   SpO2 96%   BMI 24.14 kg/m²    Physical Exam   Constitutional: She has a sickly appearance. She appears ill. Cardiovascular: Normal rate, regular rhythm, normal heart sounds and intact distal pulses. Pulmonary/Chest: No respiratory distress. She has no wheezes. She has no rales. Abdominal: Soft. Bowel sounds are normal.   Musculoskeletal: She exhibits no edema. Neurological:   Eyes open yelling in Guatemalan   Skin: Skin is warm. She is diaphoretic.        LABS:  CBC:   Lab Results   Component Value Date    WBC 4.6 02/15/2018    RBC 3.93 02/15/2018    HGB 12.0 02/15/2018    HCT 37.1 02/15/2018    MCV 94.5 02/15/2018    MCH 30.5 02/15/2018    MCHC 32.3 02/15/2018    RDW 13.5 02/15/2018     02/15/2018    MPV 9.4 09/22/2015     CBC with Differential:    Lab Results   Component Value Date    WBC 4.6 02/15/2018    RBC 3.93 02/15/2018    HGB 12.0 02/15/2018    HCT 37.1 02/15/2018     02/15/2018    MCV 94.5 02/15/2018    MCH 30.5 02/15/2018    MCHC 32.3 02/15/2018    RDW 13.5 02/15/2018    LYMPHOPCT 17.1 02/14/2018    MONOPCT 8.0 02/14/2018    BASOPCT 0.4 02/14/2018    MONOSABS 0.6 02/14/2018    LYMPHSABS 1.3 02/14/2018    EOSABS 0.1 02/14/2018    BASOSABS 0.0 02/14/2018     CMP:    Lab Results   Component Value Date     02/15/2018    K 4.4 02/15/2018     02/15/2018    CO2 25 02/15/2018    BUN 11 02/15/2018    CREATININE 0.62 02/15/2018    GFRAA >60.0 02/15/2018    LABGLOM >60.0 02/15/2018    GLUCOSE 105 02/15/2018    PROT 6.0 02/14/2018    LABALBU TOBIN, NP on 2/15/2018 at 11:58 AM    Attending Supervising Physicians [address]  I was present with the nurse practitioner during the history and exam. I discussed the findings and plans with the nurse practitioner and agree as documented in his note      Patient known to me from prior evals. Mental status changes seem unrelated to cardiac issues. SVT is noted, but not primary issue. B-blocker and observation from cardiac perspective.     Electronically signed by Koko Patrick MD on 2/15/18 at 4:00 PM

## 2018-02-15 NOTE — PROGRESS NOTES
Patient back from ultrasound, very diaphoretic and upset, shouting out in [address] IV Toradol and Ativan given per [facility], patient also had a BM so she was cleaned up by PCA and is starting to calm down now. In bed with eyes closed. Requested a fan for her room.  Electronically signed by Driss Alcantara RN on 2/15/2018 at 12:06 PM

## 2018-02-15 NOTE — PROGRESS NOTES
Pt arrived to floor via cart from ed. Was transferred to bed via maxislide. Vss at this time. Dr. Mariluz Concepcion at bedside. Unable to assess patients cognition. She opens eyes to voice; will not perform hand grasps/follow commands therefore  neuro checks are unable to assess. SR-ST on tele. Trevizo draining yellow urine, sediment noted in tubing. Will attempt to reach emergency contact on file to obtain admission questionnaire. 0245 pt HR reached 170 sustained for roughly one min. Dr. Vanessa Bah notified; one time dose of lopressor iv ordered. Pt becoming more alert, able to squeeze my hands, knees are bent in bed, when you try to straighten them, patient grimaces. Attempted to contact ; he states he doesn't speak english. Unable to complete admission at this time.      0625 HR sustaining 110-117, Dr. Vanessa Bah notified; orders second dose of lopressor iv

## 2018-02-15 NOTE — PROGRESS NOTES
Hospitalist Progress Note      PCP: Joe Barajas    Date of Admission: 2/14/2018    Interval HPI:  Patient is resting quietly in bed, has some understanding of English and denies having pain. Per the aide at bedside she has had intermittent agitation and still is trying to pull PIV and waldrop. overnight she had multiple episodes of SVT and was transferred  to  for telemetry. Subjective: :Constitutional: No fever, chills, weakness, otherwise negative  Eyes: No eye pain or redness, otherwise negative  Ears, nose, mouth, throat, and face: No ear ache, no nose bleed no sore throat otherwise negative  Respiratory: No cough, sob, hemoptysis, otherwise  negative  Cardiovascular: No chest pain, palpitation, otherwise negative  Gastrointestinal: No nausea, vomiting diarrhea otherwise negative  Genitourinary:No hematuria, no dysuria, no frequency otherwise negative  Integument/breast: No pain, discomfort, redness otherwise negative  Hematologic/lymphatic: No bleed, lymph node swelling, petechia otherwise negative  Musculoskeletal:No back, muscle or joint pain swelling, otherwise negative  Neurological: No headache, seizure, loss of consciousness otherwise negative  Behavioral/Psych: No depression, suicidal or homicidal ideation otherwise negative  Endocrine: No thyroid pain, warmth or tenderness.  No wt loss otherwise negative  Allergic/Immunologic: No sneezing, itching or rash otherwise negative        Medications:  Reviewed    Infusion Medications    dextrose 5 % and 0.45 % NaCl 100 mL/hr at 02/15/18 1153     Scheduled Medications    amiodarone  200 mg Oral Daily    aspirin  81 mg Oral Daily    busPIRone  10 mg Oral Nightly    carbidopa-levodopa  1 tablet Oral TID    lisinopril  5 mg Oral Daily    PARoxetine  40 mg Oral QAM    cefTRIAXone (ROCEPHIN) IV  1 g Intravenous Q24H    metoprolol  5 mg Intravenous Q6H    sodium chloride flush  10 mL Intravenous 2 times per day    enoxaparin  40 mg Subcutaneous

## 2018-02-15 NOTE — H&P
tablet Take 1 g by mouth 4 times daily   Yes Historical Provider, MD   Cholecalciferol (VITAMIN D3) 2000 units TABS Take by mouth every morning   Yes Historical Provider, MD       Allergies:  Latex; Penicillins; and Vancomycin    Social History:      The patient currently lives     TOBACCO:   reports that she has never smoked. She has never used smokeless tobacco.  ETOH:   reports that she does not drink alcohol. Family History:       Reviewed in detail and negative for DM, CAD, Cancer, CVA. Positive as follows:        Problem Relation Age of Onset    Heart Disease Mother     Heart Disease Father        REVIEW OF SYSTEMS:   Pertinent positives as noted in the HPI. All other systems reviewed and negative. PHYSICAL EXAM:    BP (!) 96/44   Pulse 88   Temp 99 °F (37.2 °C) (Temporal)   Resp 16   Wt 132 lb (59.9 kg)   SpO2 96%   BMI 24.14 kg/m²     General appearance:  No apparent distress, appears stated age and cooperative. HEENT:  Normal cephalic, atraumatic without obvious deformity. Pupils equal, round, and reactive to light. Extra ocular muscles intact. Conjunctivae/corneas clear. Neck: Supple, with full range of motion. No jugular venous distention. Trachea midline. Respiratory:  Normal respiratory effort. Clear to auscultation, bilaterally   Cardiovascular:  Regular rate and rhythm with normal S1/S2   Abdomen: Soft, non-tender, non-distended with normal bowel sounds. Musculoskeletal:  No clubbing, cyanosis or edema bilaterally. Full range of motion without deformity. Skin: Skin color, texture, turgor normal.  No rashes or lesions. Neurologic:  Neurovascularly intact without any focal sensory/motor deficits.  Cranial nerves: II-XII intact, grossly non-focal.  Psychiatric:  Nonverbal at time of assessment   Capillary Refill: Brisk,< 3 seconds   Peripheral Pulses: +2 palpable, equal bilaterally       Labs:     Recent Labs      02/14/18   1715   WBC  7.4   HGB  12.4   HCT  38.7   PLT  220 Recent Labs      02/14/18   1715   NA  142   K  4.1   CL  105   CO2  26   BUN  15   CREATININE  0.60   CALCIUM  8.7     Recent Labs      02/14/18   1715   AST  210*   ALT  30   BILITOT  0.3   ALKPHOS  541*     Recent Labs      02/14/18   1715   INR  1.0     Recent Labs      02/14/18   1715   CKTOTAL  105   TROPONINI  0.056*       Urinalysis:      Lab Results   Component Value Date    NITRU Negative 02/14/2018    WBCUA 6-10 02/14/2018    BACTERIA Moderate 01/03/2018    RBCUA >100 02/14/2018    BLOODU LARGE 02/14/2018    SPECGRAV 1.016 02/14/2018    GLUCOSEU Negative 02/14/2018       Radiology:     CXR: I have reviewed the CXR with the following interpretation: pending  EKG:  I have reviewed the EKG with the following interpretation: pending    CT Head WO Contrast   Final Result   Impression:      Mild cerebral atrophy. Physiologic calcification basal ganglia unchanged. No acute findings. .         All CT scans at this facility use dose modulation, iterative reconstruction, and/or weight based dosing when appropriate to reduce radiation dose to as low as reasonably achievable. XR CHEST PORTABLE   Final Result      Cardiomegaly. ASSESSMENT:    Active Hospital Problems    Diagnosis Date Noted    Altered mental state [R41.82] 02/14/2018       PLAN:    1. Altered mental status with baseline dementia- neuro checks, fall precautions, telemetry, repeat labs in AM  2. Hyperthyroidism- slight low TSH, free T4&T3 sent, will adjust synthroid if needed  3. Elevated troponin- serial enzymes, telemetry  4. Parkinson's- restart home medications      DVT Prophylaxis: lovenox  Diet:  cardiac  Code Status: Prior    PT/OT Eval and treat    Dispo - observation       TAWNY Rivers    Thank you Sandi Guevara for the opportunity to be involved in this patient's care. If you have any questions or concerns please feel free to contact me. Attending's Note:  Pt was seen and evaluated and discussed care with NP.  I

## 2018-02-16 ENCOUNTER — APPOINTMENT (OUTPATIENT)
Dept: MRI IMAGING | Age: 78
DRG: 871 | End: 2018-02-16
Payer: MEDICARE

## 2018-02-16 ENCOUNTER — APPOINTMENT (OUTPATIENT)
Dept: ULTRASOUND IMAGING | Age: 78
DRG: 871 | End: 2018-02-16
Payer: MEDICARE

## 2018-02-16 LAB
BASOPHILS ABSOLUTE: 0 K/UL (ref 0–0.2)
BASOPHILS RELATIVE PERCENT: 0.8 %
EOSINOPHILS ABSOLUTE: 0.1 K/UL (ref 0–0.7)
EOSINOPHILS RELATIVE PERCENT: 2.6 %
HCT VFR BLD CALC: 36.5 % (ref 37–47)
HEMOGLOBIN: 11.7 G/DL (ref 12–16)
LYMPHOCYTES ABSOLUTE: 0.7 K/UL (ref 1–4.8)
LYMPHOCYTES RELATIVE PERCENT: 16.2 %
MCH RBC QN AUTO: 30.2 PG (ref 27–31.3)
MCHC RBC AUTO-ENTMCNC: 32.1 % (ref 33–37)
MCV RBC AUTO: 93.9 FL (ref 82–100)
MONOCYTES ABSOLUTE: 0.4 K/UL (ref 0.2–0.8)
MONOCYTES RELATIVE PERCENT: 10.6 %
NEUTROPHILS ABSOLUTE: 2.9 K/UL (ref 1.4–6.5)
NEUTROPHILS RELATIVE PERCENT: 69.8 %
PDW BLD-RTO: 13.2 % (ref 11.5–14.5)
PLATELET # BLD: 182 K/UL (ref 130–400)
RBC # BLD: 3.89 M/UL (ref 4.2–5.4)
URINE CULTURE, ROUTINE: NORMAL
WBC # BLD: 4.2 K/UL (ref 4.8–10.8)

## 2018-02-16 PROCEDURE — 36415 COLL VENOUS BLD VENIPUNCTURE: CPT

## 2018-02-16 PROCEDURE — 2500000003 HC RX 250 WO HCPCS: Performed by: NURSE PRACTITIONER

## 2018-02-16 PROCEDURE — 99222 1ST HOSP IP/OBS MODERATE 55: CPT | Performed by: INTERNAL MEDICINE

## 2018-02-16 PROCEDURE — 99221 1ST HOSP IP/OBS SF/LOW 40: CPT | Performed by: UROLOGY

## 2018-02-16 PROCEDURE — 6360000002 HC RX W HCPCS: Performed by: INTERNAL MEDICINE

## 2018-02-16 PROCEDURE — 74181 MRI ABDOMEN W/O CONTRAST: CPT

## 2018-02-16 PROCEDURE — 93005 ELECTROCARDIOGRAM TRACING: CPT

## 2018-02-16 PROCEDURE — 6370000000 HC RX 637 (ALT 250 FOR IP): Performed by: INTERNAL MEDICINE

## 2018-02-16 PROCEDURE — 85025 COMPLETE CBC W/AUTO DIFF WBC: CPT

## 2018-02-16 PROCEDURE — 2060000000 HC ICU INTERMEDIATE R&B

## 2018-02-16 PROCEDURE — 2580000003 HC RX 258: Performed by: INTERNAL MEDICINE

## 2018-02-16 PROCEDURE — 2580000003 HC RX 258: Performed by: NURSE PRACTITIONER

## 2018-02-16 PROCEDURE — 99232 SBSQ HOSP IP/OBS MODERATE 35: CPT | Performed by: INTERNAL MEDICINE

## 2018-02-16 PROCEDURE — 93010 ELECTROCARDIOGRAM REPORT: CPT | Performed by: INTERNAL MEDICINE

## 2018-02-16 PROCEDURE — 93880 EXTRACRANIAL BILAT STUDY: CPT

## 2018-02-16 RX ORDER — PHENAZOPYRIDINE HYDROCHLORIDE 200 MG/1
200 TABLET, FILM COATED ORAL
Status: DISCONTINUED | OUTPATIENT
Start: 2018-02-16 | End: 2018-02-20 | Stop reason: HOSPADM

## 2018-02-16 RX ADMIN — PHENAZOPYRIDINE HYDROCHLORIDE 200 MG: 200 TABLET ORAL at 17:49

## 2018-02-16 RX ADMIN — Medication 2 MG: at 00:36

## 2018-02-16 RX ADMIN — PHENAZOPYRIDINE HYDROCHLORIDE 200 MG: 200 TABLET ORAL at 08:56

## 2018-02-16 RX ADMIN — METOPROLOL TARTRATE 5 MG: 5 INJECTION, SOLUTION INTRAVENOUS at 17:49

## 2018-02-16 RX ADMIN — METOPROLOL TARTRATE 5 MG: 5 INJECTION, SOLUTION INTRAVENOUS at 00:37

## 2018-02-16 RX ADMIN — DEXTROSE AND SODIUM CHLORIDE: 5; 450 INJECTION, SOLUTION INTRAVENOUS at 10:15

## 2018-02-16 RX ADMIN — AMIODARONE HYDROCHLORIDE 200 MG: 200 TABLET ORAL at 08:56

## 2018-02-16 RX ADMIN — LISINOPRIL 5 MG: 5 TABLET ORAL at 08:56

## 2018-02-16 RX ADMIN — ASPIRIN 81 MG CHEWABLE TABLET 81 MG: 81 TABLET CHEWABLE at 08:56

## 2018-02-16 RX ADMIN — LORAZEPAM 0.5 MG: 2 INJECTION INTRAMUSCULAR; INTRAVENOUS at 00:37

## 2018-02-16 RX ADMIN — BUSPIRONE HYDROCHLORIDE 10 MG: 10 TABLET ORAL at 20:19

## 2018-02-16 RX ADMIN — Medication 2 MG: at 09:15

## 2018-02-16 RX ADMIN — LORAZEPAM 0.5 MG: 2 INJECTION INTRAMUSCULAR; INTRAVENOUS at 13:10

## 2018-02-16 RX ADMIN — CARBIDOPA AND LEVODOPA 1 TABLET: 25; 100 TABLET ORAL at 08:56

## 2018-02-16 RX ADMIN — PAROXETINE HYDROCHLORIDE 40 MG: 20 TABLET, FILM COATED ORAL at 08:57

## 2018-02-16 RX ADMIN — CARBIDOPA AND LEVODOPA 1 TABLET: 25; 100 TABLET ORAL at 15:11

## 2018-02-16 RX ADMIN — CARBIDOPA AND LEVODOPA 1 TABLET: 25; 100 TABLET ORAL at 20:19

## 2018-02-16 RX ADMIN — CIPROFLOXACIN 400 MG: 2 INJECTION, SOLUTION INTRAVENOUS at 09:01

## 2018-02-16 RX ADMIN — Medication 10 ML: at 09:02

## 2018-02-16 RX ADMIN — CIPROFLOXACIN 400 MG: 2 INJECTION, SOLUTION INTRAVENOUS at 20:19

## 2018-02-16 RX ADMIN — DEXTROSE AND SODIUM CHLORIDE: 5; 450 INJECTION, SOLUTION INTRAVENOUS at 00:37

## 2018-02-16 RX ADMIN — LORAZEPAM 0.5 MG: 2 INJECTION INTRAMUSCULAR; INTRAVENOUS at 06:47

## 2018-02-16 RX ADMIN — METOPROLOL TARTRATE 5 MG: 5 INJECTION, SOLUTION INTRAVENOUS at 06:09

## 2018-02-16 ASSESSMENT — PAIN SCALES - GENERAL
PAINLEVEL_OUTOF10: 5
PAINLEVEL_OUTOF10: 8

## 2018-02-16 NOTE — CONSULTS
UROLOGY CONSULT Progress Note-Dr Varsha Mello    2/16/2018   12:14 PM    Name:  Aishwarya Curiel  MRN:    [id number]     Acct:     [de-identified]   Room:  [address] Day: 1     Admit Date: 2/14/2018  4:55 PM  PCP: Mahi Benjamin    Subjective:     C/C: Chronic indwelling Trevizo which the patient pulled out causing temporary gross hematuria after reinsertion of catheter urine has cleared  Chief Complaint   Patient presents with    Altered Mental Status     pt sent in from home by home health nurse for increased agitation and confusion       Interval History: Status: Nursing home resident with chronic indwelling Trevizo with  severe dementia  Pulled her catheter out with the balloon inflated causing temporary hematuria which has cleared after reinsertion of new Trevizo    Past Medical History:   Diagnosis Date    Depression     Fatigue     Fibromyalgia     Hyperlipidemia     Hypertension     Hypothyroid     Levodopa-induced dyskinesia     Lumbago     Muscular wasting and disuse atrophy     Myalgia     Osteoarthritis     Paralysis agitans ([address]     Parkinson's disease ([address]     Sepsis ([facility] 11/24/2017    Spinal stenosis     Thyroid disease     Urinary frequency        ROS:  ROS    Medications: Allergies:    Allergies   Allergen Reactions    Latex     Penicillins     Vancomycin        Current Meds:   amiodarone (CORDARONE) tablet 200 mg Daily   aspirin chewable tablet 81 mg Daily   busPIRone (BUSPAR) tablet 10 mg Nightly   carbidopa-levodopa (SINEMET)  MG per tablet 1 tablet TID   lisinopril (PRINIVIL;ZESTRIL) tablet 5 mg Daily   PARoxetine (PAXIL) tablet 40 mg QAM   potassium chloride 10 mEq/100 mL IVPB (Peripheral Line) PRN   magnesium sulfate 1 g in dextrose 5% 100 mL IVPB PRN   LORazepam (ATIVAN) injection 0.5 mg Q4H PRN   ketorolac (TORADOL) injection 15 mg Q6H PRN   dextrose 5 % and 0.45 % sodium chloride infusion Continuous   metoprolol (LOPRESSOR) injection 5 mg Q6H   ciprofloxacin (CIPRO) IVPB 400 mg Q12H   morphine injection 2 mg Q4H PRN   phenazopyridine (PYRIDIUM) tablet 200 mg TID WC   sodium chloride flush 0.9 % injection 10 mL 2 times per day   sodium chloride flush 0.9 % injection 10 mL PRN   acetaminophen (TYLENOL) tablet 650 mg Q4H PRN   magnesium hydroxide (MILK OF MAGNESIA) 400 MG/5ML suspension 30 mL Daily PRN   ondansetron (ZOFRAN) injection 4 mg Q6H PRN       Data:     Code Status:  Full Code    Family History   Problem Relation Age of Onset    Heart Disease Mother     Heart Disease Father        Social History     Social History    Marital status:      Spouse name: N/A    Number of children: N/A    Years of education: N/A     Occupational History    Not on file. Social History Main Topics    Smoking status: Never Smoker    Smokeless tobacco: Never Used    Alcohol use No    Drug use: No    Sexual activity: Not on file     Other Topics Concern    Not on file     Social History Narrative    [facility]    Waiver 8hrs 10am-6pm M-F, Sat 3 hrs       Vitals:  BP (!) 146/84   Pulse 81   Temp 98.5 °F (36.9 °C) (Oral)   Resp 18   Wt 132 lb (59.9 kg)   SpO2 95%   BMI 24.14 kg/m²   Temp (24hrs), Av.4 °F (36.9 °C), Min:98.1 °F (36.7 °C), Max:98.6 °F (37 °C)    No results for input(s): POCGLU in the last 72 hours. I/O (24Hr): Intake/Output Summary (Last 24 hours) at 18 1214  Last data filed at 18 0929   Gross per 24 hour   Intake           2143.3 ml   Output             2250 ml   Net           -106.7 ml       Labs:  Hematology:  Recent Labs      18   1715   18   0612   WBC  7.4   < >  4.2*   HGB  12.4   < >  11.7*   HCT  38.7   < >  36.5*   PLT  220   < >  182   PROTIME  10.1   --    --    INR  1.0   --    --    APTT  26.6   --    --     < > = values in this interval not displayed.      Chemistry:  Recent Labs      02/15/18   0535   02/15/18   1144  02/15/18   1800   NA  145*   --    --    --    K  4.4   --    --    --    CL  106   -- --    --    CO2  25   --    --    --    GLUCOSE  105   --    --    --    BUN  11   --    --    --    CREATININE  0.62   --    --    --    MG   --    --   2.1   --    ANIONGAP  14*   --    --    --    LABGLOM  >60.0   --    --    --    GFRAA  >60.0   --    --    --    CALCIUM  8.8   --    --    --    TROPONINI  0.011*   < >   --   <0.010    < > = values in this interval not displayed. Urine Culture   Lab Results   Component Value Date    LABURIN No growth 24 hours 02/14/2018         Physical Examination:        Physical Exam urine clear patient on one-on-one observation no further or other findings made    Assessment:         Hematuria subsided traumatic Trevizo  Active Hospital Problems    Diagnosis Date Noted    Altered mental status [R41.82]      Priority: High    Delirium [R41.0]     SVT (supraventricular tachycardia) ([facility] [address] [I47.1] 02/15/2018    Hypothyroidism due to medication [E03.2]     Altered mental state [R41.82] 02/14/2018         Plan:        1. No further recommendations  2. Keep Trevizo in  3.  Change monthly      Electronically signed by Deyvi Mcnally MD on 2/16/2018 at 12:14 PM

## 2018-02-16 NOTE — CONSULTS
adrien.  Lungs: Clear to ascultation, no rales/wheezing/rhonchi. Good chest wall excursion. Abdomen: Nondistended,  Soft, nontender, Bowel sounds normal  Extremities: No clubbing/cyanosis, no edema. Skin: Warm, dry, normal turgor, no rash, no bruise, no petichiae. Neuro: No myoclonus or tremor. Psych: Normal affect    Results/ Medications reviewed 2/16/2018, 10:55 AM     Laboratory, Microbiology, Pathology, Radiology, Cardiology, Medications and Transcriptions reviewed  Scheduled Meds:   amiodarone  200 mg Oral Daily    aspirin  81 mg Oral Daily    busPIRone  10 mg Oral Nightly    carbidopa-levodopa  1 tablet Oral TID    lisinopril  5 mg Oral Daily    PARoxetine  40 mg Oral QAM    metoprolol  5 mg Intravenous Q6H    ciprofloxacin  400 mg Intravenous Q12H    phenazopyridine  200 mg Oral TID WC    sodium chloride flush  10 mL Intravenous 2 times per day     Continuous Infusions:   dextrose 5 % and 0.45 % NaCl 100 mL/hr at 02/16/18 1015       Recent Labs      02/14/18   1715  02/15/18   0535  02/16/18   0612   WBC  7.4  4.6*  4.2*   HGB  12.4  12.0  11.7*   HCT  38.7  37.1  36.5*   MCV  94.8  94.5  93.9   PLT  220  200  182     Recent Labs      02/14/18   1715  02/15/18   0535   NA  142  145*   K  4.1  4.4   CL  105  106   CO2  26  25   BUN  15  11   CREATININE  0.60  0.62     Recent Labs      02/14/18   1715   AST  210*   ALT  30   BILITOT  0.3   ALKPHOS  541*     No results for input(s): LIPASE, AMYLASE in the last 72 hours. Recent Labs      02/14/18   1715   PROT  6.0*   INR  1.0     Ct Head Wo Contrast    Result Date: 2/14/2018  CT Brain Contrast medium:  Not utilized. History: Altered mental status Comparison:  August 26, 2017 Findings: Extra-axial spaces:  Normal. Intracranial hemorrhage:  None. Ventricular system:  Ventricles mildly enlarged. Sulci mildly prominent. . Basal Cisterns:  Normal. Cerebral Parenchyma:  Bilateral, symmetric physiologic calcification basal ganglia again identified. Magi French Midline Shift:  None. Cerebellum:  Normal.   Paranasal sinuses and mastoid air cells:  Normal. Visualized Orbits:  Normal.     Impression: Mild cerebral atrophy. Physiologic calcification basal ganglia unchanged. No acute findings. . All CT scans at this facility use dose modulation, iterative reconstruction, and/or weight based dosing when appropriate to reduce radiation dose to as low as reasonably achievable. Xr Chest Portable    Result Date: 2/14/2018  Chest one view. HISTORY: Altered mental status. FINDINGS: Comparison, January 3, 2018. Image obtained in partial expiration. Osseous structures intact. Cardiopericardial silhouette is enlarged. Aorta calcified. Lungs clear. Cardiomegaly. Us Abdomen Limited    Result Date: 2/15/2018  EXAMINATION: US ABDOMEN LIMITED DATE AND TIME:2/15/2018 6:45 AM CLINICAL HISTORY: Epigastric pain   elevated labs  COMPARISON: None TECHNIQUE: Gray-scale evaluation of the right upper quadrant was performed. FINDINGS:            Liver: No focal hepatic lesions. Mild biliary ectasia is again seen. This is unchanged from CT scans dating back to 2010 consistent with the patient's baseline status. .         Gallbladder: Patient is status post cholecystectomy. The common duct measures up to 6 mm. Pancreas: Was poorly visualized due to overlying bowel gas     No acute pathology. Us Carotid Artery Bilateral    Result Date: 2/16/2018  EXAMINATION: US CAROTID ARTERY BILATERAL DATE AND TIME:2/15/2018 12:15 PM CLINICAL HISTORY: Carotid artery stenosis   CAROTID STENOSIS COMPARISON:None TECHNIQUE:Carotid duplex sonograms which include gray scale and color flow evaluation are complimented with spectral waveform analysis. Please refer to chart below for specific carotid velocity measurements. The degree of stenosis recorded on this exam uses the same method of stratification used in the NASCET trials.  This complies with ACR practice guidelines and the Society of

## 2018-02-16 NOTE — PROGRESS NOTES
Progress Note  Patient: Mary Arenas  Unit/Bed: W779/P927-48  YOB: 1940  MRN: [id number]  Acct: [de-identified]   Admitting Diagnosis: Altered mental state [R41.82]  Altered mental status [R41.82]  SVT (supraventricular tachycardia) ([address] [I47.1]  Admit Date:  2/14/2018  Hospital Day: 1    Chief Complaint:MS changes SVT    Histories:  Past Medical History:   Diagnosis Date    Depression     Fatigue     Fibromyalgia     Hyperlipidemia     Hypertension     Hypothyroid     Levodopa-induced dyskinesia     Lumbago     Muscular wasting and disuse atrophy     Myalgia     Osteoarthritis     Paralysis agitans ([address]     Parkinson's disease ([address]     Sepsis ([address] 11/24/2017    Spinal stenosis     Thyroid disease     Urinary frequency      History reviewed. No pertinent surgical history. Family History   Problem Relation Age of Onset    Heart Disease Mother     Heart Disease Father      Social History     Social History    Marital status:      Spouse name: N/A    Number of children: N/A    Years of education: N/A     Social History Main Topics    Smoking status: Never Smoker    Smokeless tobacco: Never Used    Alcohol use No    Drug use: No    Sexual activity: Not Asked     Other Topics Concern    None     Social History Narrative    [facility]    Waiver 8hrs 10am-6pm M-F, Sat 3 hrs       Subjective/HPI> Tele SR 90s. No SVT noted no VT    EKG: SR 89 QTc 455        Review of Systems:   Review of Systems  NOT ABLE ALEXANDRE OBTAINED    Physical Examination:    BP (!) 149/61   Pulse 89   Temp 98.2 °F (36.8 °C) (Oral)   Resp 19   Wt 132 lb (59.9 kg)   SpO2 95%   BMI 24.14 kg/m²    Physical Exam   Constitutional: She appears cachectic. No distress. She appears chronically ill and acutely ill. HENT:   Normal cephalic and Atraumatic   Eyes: Pupils are equal, round, and reactive to light. Neck: Normal range of motion and thyroid normal. Neck supple. No JVD present. No neck adenopathy. 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 MS changes  2. Sepsis  3. Brief SVT  4. History of NSVT  5. contiinue  BB. Change to Po when she improves  Continue Amiodarone for VT suppression.   LV is normal.  QT is stable  Follow electrolytes     Electronically signed by Edy Stuart MD on 2/16/2018 at 7:08 AM

## 2018-02-16 NOTE — CARE COORDINATION
Per C3, pt is a 1:1 and is a precert for a SNF. No discharge for the weekend. LSW left a message for Balbina Feng, admissions at [facility]. Electronically signed by ADELIA Campbell on 2/16/18 at 3:44 PM    LSW left a message for Fran Xie, regarding [facility] as a 2nd choice for the pt per family.  Electronically signed by ADELIA Campbell on 2/16/18 at 3:49 PM

## 2018-02-16 NOTE — PROGRESS NOTES
MRI of abd MRCP ordered. Attempted to use [employer]  phone but all  phones are not working properly. Maintance/information aware.  is at bedside with patient but only speaks Libyan. MRI form filled out based on history provided in chart. MRI tech aware.  Electronically signed by Eliz Hoffman RN on 2/16/2018 at 12:55 PM

## 2018-02-16 NOTE — PROGRESS NOTES
day     PRN Meds: potassium chloride, magnesium sulfate, LORazepam, ketorolac, morphine, sodium chloride flush, acetaminophen, magnesium hydroxide, ondansetron      Intake/Output Summary (Last 24 hours) at 02/16/18 1611  Last data filed at 02/16/18 1243   Gross per 24 hour   Intake           2243.3 ml   Output             2250 ml   Net             -6.7 ml       Exam:    BP (!) 112/55   Pulse 77   Temp 98.5 °F (36.9 °C) (Oral)   Resp 18   Wt 132 lb (59.9 kg)   SpO2 95%   BMI 24.14 kg/m²     General appearance: No apparent distress, appears stated age and cooperative. HEENT: Pupils equal, round, and reactive to light. Conjunctivae/corneas clear. Neck: Supple, with full range of motion. No jugular venous distention. Trachea midline. Respiratory:  Normal respiratory effort. Clear to auscultation, bilaterally  Cardiovascular: Regular rate and rhythm with normal S1/S2   Abdomen: Soft, non-distended, tenderness on palpation to mid abdominal, hypoactive BS  Musculoskeletal: No clubbing, cyanosis or edema bilaterally. Full range of motion without deformity. Skin: Skin color, texture, turgor normal.  No rashes or lesions. Neuro: Non Focal. Symetrical motor and tone. Nl Comprehension, Alert,awake and oriented. NL CN. Symetrical tone and reflexes.   Psychiatric: Alert and oriented, thought content appropriate, normal insight  Capillary Refill: Brisk,< 3 seconds   Peripheral Pulses: +2 palpable, equal bilaterally       Labs:   Recent Labs      02/14/18   1715  02/15/18   0535  02/16/18   0612   WBC  7.4  4.6*  4.2*   HGB  12.4  12.0  11.7*   HCT  38.7  37.1  36.5*   PLT  220  200  182     Recent Labs      02/14/18   1715  02/15/18   0535   NA  142  145*   K  4.1  4.4   CL  105  106   CO2  26  25   BUN  15  11   CREATININE  0.60  0.62   CALCIUM  8.7  8.8     Recent Labs      02/14/18   1715   AST  210*   ALT  30   BILITOT  0.3   ALKPHOS  541*     Recent Labs      02/14/18   1715   INR  1.0     Recent Labs

## 2018-02-16 NOTE — CARE COORDINATION
LSW left message with son. LSW called Pts home phone no. And her spouse said to call son. Spouse speaks Upper sorbian only. .Electronically signed by ADELIA Pearce on 2/16/2018 at 11:13 AM

## 2018-02-17 LAB
ALBUMIN SERPL-MCNC: 3.6 G/DL (ref 3.9–4.9)
ALP BLD-CCNC: 528 U/L (ref 40–130)
ALT SERPL-CCNC: 28 U/L (ref 0–33)
AST SERPL-CCNC: 90 U/L (ref 0–35)
BASOPHILS ABSOLUTE: 0.1 K/UL (ref 0–0.2)
BASOPHILS RELATIVE PERCENT: 1.2 %
BILIRUB SERPL-MCNC: 0.3 MG/DL (ref 0–1.2)
BILIRUBIN DIRECT: 0 MG/DL (ref 0–0.3)
BILIRUBIN, INDIRECT: 0.3 MG/DL (ref 0–0.6)
EOSINOPHILS ABSOLUTE: 0.1 K/UL (ref 0–0.7)
EOSINOPHILS RELATIVE PERCENT: 1.4 %
GLUCOSE BLD-MCNC: 103 MG/DL (ref 60–115)
HCT VFR BLD CALC: 37.7 % (ref 37–47)
HEMOGLOBIN: 12.2 G/DL (ref 12–16)
LYMPHOCYTES ABSOLUTE: 1 K/UL (ref 1–4.8)
LYMPHOCYTES RELATIVE PERCENT: 21.4 %
MCH RBC QN AUTO: 30.6 PG (ref 27–31.3)
MCHC RBC AUTO-ENTMCNC: 32.3 % (ref 33–37)
MCV RBC AUTO: 94.6 FL (ref 82–100)
MONOCYTES ABSOLUTE: 0.4 K/UL (ref 0.2–0.8)
MONOCYTES RELATIVE PERCENT: 8.5 %
NEUTROPHILS ABSOLUTE: 3 K/UL (ref 1.4–6.5)
NEUTROPHILS RELATIVE PERCENT: 67.5 %
PDW BLD-RTO: 13.6 % (ref 11.5–14.5)
PERFORMED ON: NORMAL
PLATELET # BLD: 222 K/UL (ref 130–400)
RBC # BLD: 3.98 M/UL (ref 4.2–5.4)
TOTAL PROTEIN: 6.2 G/DL (ref 6.4–8.1)
WBC # BLD: 4.4 K/UL (ref 4.8–10.8)

## 2018-02-17 PROCEDURE — 6370000000 HC RX 637 (ALT 250 FOR IP): Performed by: INTERNAL MEDICINE

## 2018-02-17 PROCEDURE — G8979 MOBILITY GOAL STATUS: HCPCS

## 2018-02-17 PROCEDURE — 85025 COMPLETE CBC W/AUTO DIFF WBC: CPT

## 2018-02-17 PROCEDURE — G8978 MOBILITY CURRENT STATUS: HCPCS

## 2018-02-17 PROCEDURE — 36415 COLL VENOUS BLD VENIPUNCTURE: CPT

## 2018-02-17 PROCEDURE — 6360000002 HC RX W HCPCS: Performed by: INTERNAL MEDICINE

## 2018-02-17 PROCEDURE — 2060000000 HC ICU INTERMEDIATE R&B

## 2018-02-17 PROCEDURE — 2500000003 HC RX 250 WO HCPCS: Performed by: NURSE PRACTITIONER

## 2018-02-17 PROCEDURE — 80076 HEPATIC FUNCTION PANEL: CPT

## 2018-02-17 PROCEDURE — 6370000000 HC RX 637 (ALT 250 FOR IP): Performed by: NURSE PRACTITIONER

## 2018-02-17 PROCEDURE — 97162 PT EVAL MOD COMPLEX 30 MIN: CPT

## 2018-02-17 PROCEDURE — 2580000003 HC RX 258: Performed by: NURSE PRACTITIONER

## 2018-02-17 PROCEDURE — 2580000003 HC RX 258: Performed by: INTERNAL MEDICINE

## 2018-02-17 PROCEDURE — 93005 ELECTROCARDIOGRAM TRACING: CPT

## 2018-02-17 RX ADMIN — ACETAMINOPHEN 650 MG: 325 TABLET, FILM COATED ORAL at 15:32

## 2018-02-17 RX ADMIN — CARBIDOPA AND LEVODOPA 1 TABLET: 25; 100 TABLET ORAL at 20:55

## 2018-02-17 RX ADMIN — CARBIDOPA AND LEVODOPA 1 TABLET: 25; 100 TABLET ORAL at 09:04

## 2018-02-17 RX ADMIN — METOPROLOL TARTRATE 5 MG: 5 INJECTION, SOLUTION INTRAVENOUS at 20:54

## 2018-02-17 RX ADMIN — PHENAZOPYRIDINE HYDROCHLORIDE 200 MG: 200 TABLET ORAL at 18:12

## 2018-02-17 RX ADMIN — ASPIRIN 81 MG CHEWABLE TABLET 81 MG: 81 TABLET CHEWABLE at 09:03

## 2018-02-17 RX ADMIN — LORAZEPAM 0.5 MG: 2 INJECTION INTRAMUSCULAR; INTRAVENOUS at 06:28

## 2018-02-17 RX ADMIN — DEXTROSE AND SODIUM CHLORIDE: 5; 450 INJECTION, SOLUTION INTRAVENOUS at 06:48

## 2018-02-17 RX ADMIN — LORAZEPAM 0.5 MG: 2 INJECTION INTRAMUSCULAR; INTRAVENOUS at 20:55

## 2018-02-17 RX ADMIN — LORAZEPAM 0.5 MG: 2 INJECTION INTRAMUSCULAR; INTRAVENOUS at 11:21

## 2018-02-17 RX ADMIN — CIPROFLOXACIN 400 MG: 2 INJECTION, SOLUTION INTRAVENOUS at 09:04

## 2018-02-17 RX ADMIN — Medication 10 ML: at 10:54

## 2018-02-17 RX ADMIN — Medication 10 ML: at 09:05

## 2018-02-17 RX ADMIN — CIPROFLOXACIN 400 MG: 2 INJECTION, SOLUTION INTRAVENOUS at 20:58

## 2018-02-17 RX ADMIN — LISINOPRIL 5 MG: 5 TABLET ORAL at 09:04

## 2018-02-17 RX ADMIN — PAROXETINE HYDROCHLORIDE 40 MG: 20 TABLET, FILM COATED ORAL at 09:05

## 2018-02-17 RX ADMIN — KETOROLAC TROMETHAMINE 15 MG: 15 INJECTION, SOLUTION INTRAMUSCULAR; INTRAVENOUS at 20:55

## 2018-02-17 RX ADMIN — METOPROLOL TARTRATE 5 MG: 5 INJECTION, SOLUTION INTRAVENOUS at 10:54

## 2018-02-17 RX ADMIN — METOPROLOL TARTRATE 5 MG: 5 INJECTION, SOLUTION INTRAVENOUS at 17:21

## 2018-02-17 RX ADMIN — PHENAZOPYRIDINE HYDROCHLORIDE 200 MG: 200 TABLET ORAL at 12:41

## 2018-02-17 RX ADMIN — AMIODARONE HYDROCHLORIDE 200 MG: 200 TABLET ORAL at 09:03

## 2018-02-17 RX ADMIN — DEXTROSE AND SODIUM CHLORIDE: 5; 450 INJECTION, SOLUTION INTRAVENOUS at 17:21

## 2018-02-17 RX ADMIN — BUSPIRONE HYDROCHLORIDE 10 MG: 10 TABLET ORAL at 20:55

## 2018-02-17 RX ADMIN — CARBIDOPA AND LEVODOPA 1 TABLET: 25; 100 TABLET ORAL at 14:35

## 2018-02-17 RX ADMIN — PHENAZOPYRIDINE HYDROCHLORIDE 200 MG: 200 TABLET ORAL at 09:05

## 2018-02-17 ASSESSMENT — PAIN SCALES - WONG BAKER: WONGBAKER_NUMERICALRESPONSE: 2

## 2018-02-17 ASSESSMENT — PAIN SCALES - GENERAL: PAINLEVEL_OUTOF10: 5

## 2018-02-17 NOTE — PROGRESS NOTES
Physical Therapy Med Surg Initial Assessment  Facility/Department: [address] [facility] [place]  [facility]       NAME: Yaneli Olmedo  : 1940 (70 y.o.)  MRN: [id number]  CODE STATUS: Full Code    Date of Service: 2018    Patient Diagnosis(es): Altered mental state [R41.82]  Altered mental status [R41.82]  SVT (supraventricular tachycardia) ([address] [I47.1]   Chief Complaint   Patient presents with    Altered Mental Status     pt sent in from home by home health nurse for increased agitation and confusion     Patient Active Problem List    Diagnosis Date Noted    Tachycardia 2018     Priority: High    PSVT (paroxysmal supraventricular tachycardia) ([address] 2017     Priority: High    Altered mental status      Priority: High    Delirium     SVT (supraventricular tachycardia) ([address] 02/15/2018    Hypothyroidism due to medication     Altered mental state 2018    SOB (shortness of breath) 2018    Chronic pain 2018    Age-related physical debility 2018    Acute cystitis without hematuria     Fibromyalgia 2017    Essential hypertension 2017    Parkinson disease ([address] 2017    Hypothyroidism     Hyperglycemia 10/27/2017    Urinary retention 10/27/2017        Past Medical History:   Diagnosis Date    Depression     Fatigue     Fibromyalgia     Hyperlipidemia     Hypertension     Hypothyroid     Levodopa-induced dyskinesia     Lumbago     Muscular wasting and disuse atrophy     Myalgia     Osteoarthritis     Paralysis agitans ([address]     Parkinson's disease ([address]     Sepsis ([address] 2017    Spinal stenosis     Thyroid disease     Urinary frequency      History reviewed. No pertinent surgical history. Chart Reviewed: Yes  Patient assessed for rehabilitation services?: Yes  Family / Caregiver Present: No    Restrictions:  Restrictions/Precautions: Fall Risk (1:1 d.t fall out of bed this morning)  Body mass index is 24.14 kg/m².

## 2018-02-17 NOTE — FLOWSHEET NOTE
Pt resting in bed. 1:1 sitter present. Pt diaphoretic. VS checked. Pt crying out in Frisian. Pt repeatedly trying to get out of bed.

## 2018-02-17 NOTE — PLAN OF CARE
Problem: Pain:  Goal: Patient's pain/discomfort is manageable  Patient's pain/discomfort is manageable   Outcome: Ongoing    Goal: Pain level will decrease  Pain level will decrease   Outcome: Ongoing    Goal: Control of acute pain  Control of acute pain   Outcome: Ongoing    Goal: Control of chronic pain  Control of chronic pain   Outcome: Ongoing      Problem: Skin Integrity:  Goal: Skin integrity will stabilize  Skin integrity will stabilize   Outcome: Ongoing

## 2018-02-17 NOTE — CARE COORDINATION
PT'S PLAN REMAINS [place]- PT REMAINS 1:1 AT THIS TIME- PT NEEDS OFF 1:1 FOR 24 HOURS THEN NH CAN TAKE PER LAST LSW NOTE- C3 WILL CONTINUE TO FOLLOW/ LH

## 2018-02-17 NOTE — PROGRESS NOTES
Hospitalist Progress Note  2/17/2018 3:59 PM      Assessment and Plan:     1. Sepsis due to UTI (+UA) cysititis with hematuria (likely both traumatic from pulling at catheter and infectious) however also acute cholangiitis with retained gall stones seen on MRCP s/p cholecystectomy: Continue IV Cipro. Pain better controlled. Consulted GI . Pain meds ordered with hold parameters. **Obtain TWO SETS OF BLOOD CULTURES STAT IF SPIKES FEVER .4F or GREATER** Urology consulted. IV  cipro for coverage of both urinary and hepatobiliary sources of infection. Ordered pyridium for some additional pain relief. 2. SVT: Stable Lopressor IVP. Consulted Cardio. Telemetry ordered. Goal K and Mg 4.0 and 2.0, respectively. 3. Acute Metabolic Encephalopathy, likely multifactorial: Neurochecks, Fall precautions, aspiration precautions. Will control pain. Will treat underlying acute illness. Needs bedside sitter as she tries to get up out of bed and tries to pull at her waldrop and iv lines. 4. Parkinsons Disease: On home meds. 5. Prolonged QTc: Goal K and Mg 4.0 and 2.0 respectively. Discontinued QT prolonging agents wherever possible. Repeat EKG in AM. Continue Telemetry. 6. DVT PPX:  SCDs until hematuria resolves. 1. Functional Status: Fall precautions. 2.  Nutrition status and Diet: DIET CARDIAC; Dysphagia II Mechanically Altered. Evaluated nutrition status and appropriate dietary supplements ordered. 3. Advance Directive: Full Code   4. Discharge planning: SW on board. Will discharge once medically stable and cleared by all consultants. 5. High Risk Readmission Screening Tool Score Noted. Additionally, the following hospital problems were addressed and taken into consideration:  Active Problems:    Altered mental status    Altered mental state    SVT (supraventricular tachycardia) (HCC)    Hypothyroidism due to medication    Delirium  Resolved Problems:    * No resolved hospital problems.  *        ** Total time spent reviewing medical records, evaluating patient, speaking with RN's and consultants where I was focused exclusively on this patient: 45 minutes. Subjective:   Admit Date: 2/14/2018  PCP: Naheed Riding last night. Kept trying to climb out of bed. Recognized  today. Telemetry reviewed. Afebrile. Feels So-So, some abd pain lower abd but otherwise no pain. Per bedside sitter she fell this AM. No new complaints. Pt denies chest pain, SOB, N/V, fevers or chills. Objective:     Vitals:    02/16/18 1939 02/17/18 0702 02/17/18 1057 02/17/18 1407   BP: 130/66 (!) 154/80 (!) 152/74 (!) 169/105   Pulse: 85 97 116 93   Resp: 16 16 20 20   Temp: 97.2 °F (36.2 °C) 98.6 °F (37 °C) 98.1 °F (36.7 °C) 97.2 °F (36.2 °C)   TempSrc: Oral Oral Oral Oral   SpO2: 98% 97% 95% 97%   Weight:         General appearance: Mild acute distress, calm, (+) lethargy + O x 1-2. No conversational dyspnea noted. No gross focal neurologic deficits noted. CNII through CNXII intact grossly  Lungs: CTAB;  no expiratory wheezes, no course rales, No crackles  Heart:  S1, S2 normal, RRR, no MRG appreciated  Abdomen: (+) BS, soft, (+) TTP Suprapubic; non distended, no guarding or rigidity noted  exam   Extremities:  Dorsalis Pedis pulses palpable bilaterally, no cyanosis, Trace edema bilat lower exts. No calf tenderness bilat.        Medications:      dextrose 5 % and 0.45 % NaCl 100 mL/hr at 02/17/18 0673      phenazopyridine  200 mg Oral TID WC    amiodarone  200 mg Oral Daily    aspirin  81 mg Oral Daily    busPIRone  10 mg Oral Nightly    carbidopa-levodopa  1 tablet Oral TID    lisinopril  5 mg Oral Daily    PARoxetine  40 mg Oral QAM    metoprolol  5 mg Intravenous Q6H    ciprofloxacin  400 mg Intravenous Q12H    sodium chloride flush  10 mL Intravenous 2 times per day       LABS Reviewed    IMAGING Reviewed    Bharathi Galaviz MD  Rounding Hospitalist

## 2018-02-18 LAB
BASOPHILS ABSOLUTE: 0.1 K/UL (ref 0–0.2)
BASOPHILS RELATIVE PERCENT: 1 %
EOSINOPHILS ABSOLUTE: 0.1 K/UL (ref 0–0.7)
EOSINOPHILS RELATIVE PERCENT: 1.8 %
HCT VFR BLD CALC: 36.1 % (ref 37–47)
HEMOGLOBIN: 11.9 G/DL (ref 12–16)
LYMPHOCYTES ABSOLUTE: 1 K/UL (ref 1–4.8)
LYMPHOCYTES RELATIVE PERCENT: 17.1 %
MCH RBC QN AUTO: 30.7 PG (ref 27–31.3)
MCHC RBC AUTO-ENTMCNC: 32.9 % (ref 33–37)
MCV RBC AUTO: 93.5 FL (ref 82–100)
MONOCYTES ABSOLUTE: 0.5 K/UL (ref 0.2–0.8)
MONOCYTES RELATIVE PERCENT: 8.8 %
NEUTROPHILS ABSOLUTE: 4.2 K/UL (ref 1.4–6.5)
NEUTROPHILS RELATIVE PERCENT: 71.3 %
PDW BLD-RTO: 13.3 % (ref 11.5–14.5)
PLATELET # BLD: 192 K/UL (ref 130–400)
RBC # BLD: 3.86 M/UL (ref 4.2–5.4)
WBC # BLD: 5.8 K/UL (ref 4.8–10.8)

## 2018-02-18 PROCEDURE — 6370000000 HC RX 637 (ALT 250 FOR IP): Performed by: INTERNAL MEDICINE

## 2018-02-18 PROCEDURE — 2580000003 HC RX 258: Performed by: INTERNAL MEDICINE

## 2018-02-18 PROCEDURE — 6370000000 HC RX 637 (ALT 250 FOR IP): Performed by: PSYCHIATRY & NEUROLOGY

## 2018-02-18 PROCEDURE — G8987 SELF CARE CURRENT STATUS: HCPCS

## 2018-02-18 PROCEDURE — 2060000000 HC ICU INTERMEDIATE R&B

## 2018-02-18 PROCEDURE — 93005 ELECTROCARDIOGRAM TRACING: CPT

## 2018-02-18 PROCEDURE — 36415 COLL VENOUS BLD VENIPUNCTURE: CPT

## 2018-02-18 PROCEDURE — 85025 COMPLETE CBC W/AUTO DIFF WBC: CPT

## 2018-02-18 PROCEDURE — 97167 OT EVAL HIGH COMPLEX 60 MIN: CPT

## 2018-02-18 PROCEDURE — 6360000002 HC RX W HCPCS: Performed by: INTERNAL MEDICINE

## 2018-02-18 PROCEDURE — 2580000003 HC RX 258: Performed by: NURSE PRACTITIONER

## 2018-02-18 PROCEDURE — G8988 SELF CARE GOAL STATUS: HCPCS

## 2018-02-18 PROCEDURE — 2500000003 HC RX 250 WO HCPCS: Performed by: NURSE PRACTITIONER

## 2018-02-18 RX ORDER — QUETIAPINE FUMARATE 25 MG/1
12.5 TABLET, FILM COATED ORAL 2 TIMES DAILY PRN
Status: DISCONTINUED | OUTPATIENT
Start: 2018-02-18 | End: 2018-02-20 | Stop reason: HOSPADM

## 2018-02-18 RX ORDER — ACETAMINOPHEN 80 MG
TABLET,CHEWABLE ORAL ONCE
Status: DISCONTINUED | OUTPATIENT
Start: 2018-02-18 | End: 2018-02-20 | Stop reason: HOSPADM

## 2018-02-18 RX ORDER — QUETIAPINE FUMARATE 25 MG/1
12.5 TABLET, FILM COATED ORAL 2 TIMES DAILY
Status: DISCONTINUED | OUTPATIENT
Start: 2018-02-18 | End: 2018-02-20 | Stop reason: HOSPADM

## 2018-02-18 RX ORDER — PAROXETINE 30 MG/1
30 TABLET, FILM COATED ORAL EVERY MORNING
Status: DISCONTINUED | OUTPATIENT
Start: 2018-02-19 | End: 2018-02-20 | Stop reason: HOSPADM

## 2018-02-18 RX ADMIN — ASPIRIN 81 MG CHEWABLE TABLET 81 MG: 81 TABLET CHEWABLE at 08:59

## 2018-02-18 RX ADMIN — QUETIAPINE FUMARATE 12.5 MG: 25 TABLET ORAL at 11:35

## 2018-02-18 RX ADMIN — PAROXETINE HYDROCHLORIDE 40 MG: 20 TABLET, FILM COATED ORAL at 08:59

## 2018-02-18 RX ADMIN — PHENAZOPYRIDINE HYDROCHLORIDE 200 MG: 200 TABLET ORAL at 17:49

## 2018-02-18 RX ADMIN — PHENAZOPYRIDINE HYDROCHLORIDE 200 MG: 200 TABLET ORAL at 08:59

## 2018-02-18 RX ADMIN — DEXTROSE AND SODIUM CHLORIDE: 5; 450 INJECTION, SOLUTION INTRAVENOUS at 15:39

## 2018-02-18 RX ADMIN — QUETIAPINE FUMARATE 12.5 MG: 25 TABLET ORAL at 22:27

## 2018-02-18 RX ADMIN — CARBIDOPA AND LEVODOPA 1 TABLET: 25; 100 TABLET ORAL at 14:12

## 2018-02-18 RX ADMIN — AMIODARONE HYDROCHLORIDE 200 MG: 200 TABLET ORAL at 08:59

## 2018-02-18 RX ADMIN — METOPROLOL TARTRATE 5 MG: 5 INJECTION, SOLUTION INTRAVENOUS at 17:40

## 2018-02-18 RX ADMIN — CARBIDOPA AND LEVODOPA 1 TABLET: 25; 100 TABLET ORAL at 08:59

## 2018-02-18 RX ADMIN — CARBIDOPA AND LEVODOPA 1 TABLET: 25; 100 TABLET ORAL at 22:29

## 2018-02-18 RX ADMIN — PHENAZOPYRIDINE HYDROCHLORIDE 200 MG: 200 TABLET ORAL at 11:35

## 2018-02-18 RX ADMIN — METOPROLOL TARTRATE 5 MG: 5 INJECTION, SOLUTION INTRAVENOUS at 08:58

## 2018-02-18 RX ADMIN — Medication 2 MG: at 01:08

## 2018-02-18 RX ADMIN — LISINOPRIL 5 MG: 5 TABLET ORAL at 08:59

## 2018-02-18 RX ADMIN — CIPROFLOXACIN 400 MG: 2 INJECTION, SOLUTION INTRAVENOUS at 10:07

## 2018-02-18 RX ADMIN — Medication 10 ML: at 08:58

## 2018-02-18 RX ADMIN — QUETIAPINE FUMARATE 12.5 MG: 25 TABLET ORAL at 17:49

## 2018-02-18 RX ADMIN — METOPROLOL TARTRATE 5 MG: 5 INJECTION, SOLUTION INTRAVENOUS at 22:27

## 2018-02-18 RX ADMIN — CIPROFLOXACIN 400 MG: 2 INJECTION, SOLUTION INTRAVENOUS at 22:28

## 2018-02-18 ASSESSMENT — PAIN SCALES - GENERAL: PAINLEVEL_OUTOF10: 6

## 2018-02-18 NOTE — PROGRESS NOTES
tachycardia) ([facility]    Hypothyroidism due to medication    Delirium  Resolved Problems:    * No resolved hospital problems. *        ** Total time spent reviewing medical records, evaluating patient, speaking with RN's and consultants where I was focused exclusively on this patient: 45 minutes. Subjective:   Admit Date: 2/14/2018  PCP: Rafael HENRY    Afebrile. No new complaints. Pt denies chest pain, SOB, N/V, fevers or chills. Objective:     Vitals:    02/17/18 1407 02/17/18 2342 02/18/18 0715 02/18/18 1435   BP: (!) 169/105 (!) 144/58 (!) 159/64 (!) 110/58   Pulse: 93 74 77 75   Resp: 20 18 16 16   Temp: 97.2 °F (36.2 °C) 98.3 °F (36.8 °C) 98.6 °F (37 °C) 98.2 °F (36.8 °C)   TempSrc: Oral Axillary Oral Oral   SpO2: 97% 97% 95% 97%   Weight:         General appearance: Mild acute distress, calm, (+) lethargy + O x 1-2. No conversational dyspnea noted. No gross focal neurologic deficits noted. CNII through CNXII intact grossly  Lungs: CTAB;  no expiratory wheezes, no course rales, No crackles  Heart:  S1, S2 normal, RRR, no MRG appreciated  Abdomen: (+) BS, soft, (+) TTP Suprapubic; non distended, no guarding or rigidity noted  exam   Extremities:  Dorsalis Pedis pulses palpable bilaterally, no cyanosis, Trace edema bilat lower exts. No calf tenderness bilat.        Medications:      dextrose 5 % and 0.45 % NaCl 100 mL/hr at 02/18/18 1539      [START ON 2/19/2018] PARoxetine  30 mg Oral QAM    QUEtiapine  12.5 mg Oral BID    pill splitter   Does not apply Once    phenazopyridine  200 mg Oral TID WC    amiodarone  200 mg Oral Daily    aspirin  81 mg Oral Daily    carbidopa-levodopa  1 tablet Oral TID    lisinopril  5 mg Oral Daily    metoprolol  5 mg Intravenous Q6H    ciprofloxacin  400 mg Intravenous Q12H    sodium chloride flush  10 mL Intravenous 2 times per day       LABS Reviewed    IMAGING Reviewed    Jennifer Mark MD  Rounding Hospitalist

## 2018-02-18 NOTE — CONSULTS
BEHAVIORAL HEALTH CONSUT NOTE     2/18/2018     Patient was seen and examined in person, Chart reviewed   Patient's case discussed with staff/team    Chief Complaint: DEMENTIA, AGITATION    Interim History:     Pt presented to hospital with increase confusion and agitation  Poor historian during interview  Pt has poor safety awareness  Pt is at increase risk of fall  Pt has been getting agitated  Pt has been hallucinating visually  H/O parkinson disease on sinemet  Sundowning  Has been tremulous. Appetite:   [] Normal/Unchanged  [] Increased  [x] Decreased      Sleep:       [] Normal/Unchanged  [] Fair       [x] Poor              Energy:    [] Normal/Unchanged  [] Increased  [x] Decreased        SI [] Present  [] Absent    HI  []Present  [] Absent     Aggression:  [] yes  [] no    Patient is [] able  [] unable to CONTRACT FOR SAFETY     PAST MEDICAL/PSYCHIATRIC HISTORY:   Past Medical History:   Diagnosis Date    Depression     Fatigue     Fibromyalgia     Hyperlipidemia     Hypertension     Hypothyroid     Levodopa-induced dyskinesia     Lumbago     Muscular wasting and disuse atrophy     Myalgia     Osteoarthritis     [facility]     Parkinson's disease ([facility]     Sepsis ([facility] 11/24/2017    Spinal stenosis     Thyroid disease     Urinary frequency        FAMILY/SOCIAL HISTORY:  Family History   Problem Relation Age of Onset    Heart Disease Mother     Heart Disease Father      Social History     Social History    Marital status:      Spouse name: N/A    Number of children: N/A    Years of education: N/A     Occupational History    Not on file.      Social History Main Topics    Smoking status: Never Smoker    Smokeless tobacco: Never Used    Alcohol use No    Drug use: No    Sexual activity: Not on file     Other Topics Concern    Not on file     Social History Narrative    [facility]    Waiver 8hrs 10am-6pm M-F, Sat 3 hrs           ROS:  [x] All negative/unchanged except if checked.  Explain positive(checked items) below:  [] Constitutional  [] Eyes  [] Ear/Nose/Mouth/Throat  [] Respiratory  [] CV  [] GI  []   [] Musculoskeletal  [] Skin/Breast  [] Neurological  [] Endocrine  [] Heme/Lymph  [] Allergic/Immunologic    Explanation:     MEDICATIONS:    Current Facility-Administered Medications:     [START ON 2/19/2018] PARoxetine (PAXIL) tablet 30 mg, 30 mg, Oral, QAM, Scotty Lott MD    QUEtiapine (SEROQUEL) tablet 12.5 mg, 12.5 mg, Oral, BID, Scotty Lott MD, 12.5 mg at 02/18/18 1135    QUEtiapine (SEROQUEL) tablet 12.5 mg, 12.5 mg, Oral, BID PRN, Scotty Lott MD, 12.5 mg at 02/18/18 1749    pill splitter, , Does not apply, Once, Scotty Lott MD    phenazopyridine (PYRIDIUM) tablet 200 mg, 200 mg, Oral, TID WC, Liv Flores MD, 200 mg at 02/18/18 1749    amiodarone (CORDARONE) tablet 200 mg, 200 mg, Oral, Daily, Joceline Chapin MD, 200 mg at 02/18/18 2377    aspirin chewable tablet 81 mg, 81 mg, Oral, Daily, Joceline Chapin MD, 81 mg at 02/18/18 0859    carbidopa-levodopa (SINEMET)  MG per tablet 1 tablet, 1 tablet, Oral, TID, Joceline Chapin MD, 1 tablet at 02/18/18 1412    lisinopril (PRINIVIL;ZESTRIL) tablet 5 mg, 5 mg, Oral, Daily, Joceline Chapin MD, 5 mg at 02/18/18 0859    potassium chloride 10 mEq/100 mL IVPB (Peripheral Line), 10 mEq, Intravenous, PRN, Timoteo Huggins MD    magnesium sulfate 1 g in dextrose 5% 100 mL IVPB, 1 g, Intravenous, PRN, Timoteo Huggins MD    LORazepam (ATIVAN) injection 0.5 mg, 0.5 mg, Intravenous, Q4H PRN, Liv Flores MD, 0.5 mg at 02/17/18 2055    ketorolac (TORADOL) injection 15 mg, 15 mg, Intravenous, Q6H PRN, Timoteo Huggins MD, 15 mg at 02/17/18 2055    dextrose 5 % and 0.45 % sodium chloride infusion, , Intravenous, Continuous, Liv Flores MD, Last Rate: 100 mL/hr at 02/18/18 1539    metoprolol (LOPRESSOR) injection 5 mg, 5 mg, Intravenous, Q6H, Ryan Lomeli NP, 5 mg at 02/18/18 1740    ciprofloxacin (CIPRO) IVPB 400 mg, 400 mg, Intravenous, Q12H, Severino Pruitt MD, Stopped at 02/18/18 1107    morphine injection 2 mg, 2 mg, Intravenous, Q4H PRN, Severino Pruitt MD, 2 mg at 02/18/18 0108    sodium chloride flush 0.9 % injection 10 mL, 10 mL, Intravenous, 2 times per day, José Miguel Ego, APRN, 10 mL at 02/18/18 1940    sodium chloride flush 0.9 % injection 10 mL, 10 mL, Intravenous, PRN, José Miguel Ego, APRN, 10 mL at 02/17/18 1054    acetaminophen (TYLENOL) tablet 650 mg, 650 mg, Oral, Q4H PRN, José Miguel Ego, APRN, 650 mg at 02/17/18 1532    magnesium hydroxide (MILK OF MAGNESIA) 400 MG/5ML suspension 30 mL, 30 mL, Oral, Daily PRN, José Miguel Ego, APRN    ondansetron (ZOFRAN) injection 4 mg, 4 mg, Intravenous, Q6H PRN, José Miguel Ego, APRN      Examination:  BP (!) 110/58   Pulse 75   Temp 98.2 °F (36.8 °C) (Oral)   Resp 16   Wt 132 lb (59.9 kg)   SpO2 97%   BMI 24.14 kg/m²   Gait - unsteady  Medication side effects(SE): no    Mental Status Examination:    Level of consciousness:  within normal limits   Appearance:  poor grooming and poor hygiene  Behavior/Motor:  psychomotor retardation  Attitude toward examiner:  withdrawn  Speech:  slow   Mood: anxious  Affect:  blunted  Thought processes:  slow   Thought content:  Delusions:  no evidence of delusions  Perceptual Disturbance:  visual  Cognition:  disoriented   Concentration poor  Insight poor   Judgement poor     ASSESSMENT:   Patient symptoms are:  [] Well controlled  [] Improving  [x] Worsening  [x] No change      Diagnosis:   Dementia - Lewy body type  Active Problems:    Altered mental status    Altered mental state    SVT (supraventricular tachycardia) (HCC)    Hypothyroidism due to medication    Delirium  Resolved Problems:    * No resolved hospital problems.  *      LABS:    Recent Labs      02/16/18   0612  02/17/18   0540  02/18/18   0508   WBC  4.2*  4.4*  5.8   HGB  11.7*  12.2  11.9* PLT  182  222  192     No results for input(s): NA, K, CL, CO2, BUN, CREATININE, GLUCOSE in the last 72 hours. Recent Labs      02/17/18   0540   BILITOT  0.3   ALKPHOS  528*   AST  90*   ALT  28     No results found for: LABAMPH, BARBSCNU, LABBENZ, CANNAB, COCAINESCRN, LABMETH, OPIATESCREENURINE, PHENCYCLIDINESCREENURINE, PPXUR, ETOH  Lab Results   Component Value Date    TSH 0.055 02/14/2018     No results found for: LITHIUM  No results found for: VALPROATE, CBMZ      Treatment Plan:  1. Pt has a recent decline in her cognitive ability and confusion  2. Seroquel 12.5 mg po bid started'  3. Will need in patient Geropsych admission when medically stable    Thanks for the consult.  Please call me if needed.          Electronically signed by Sun Humphreys MD on 2/18/2018 at 7:21 PM

## 2018-02-18 NOTE — PROGRESS NOTES
[facility] OCCUPATIONAL THERAPY EVALUATION - ACUTE     Date: 2018  Patient Name: Francse Snell        MRN: [id number]  Account: [de-identified]   : 1940  (70 y.o.)  Room: [place] [address]    Chart Review:  Diagnosis:  The primary encounter diagnosis was Delirium. A diagnosis of Elevated troponin was also pertinent to this visit. Past Medical History:   Diagnosis Date    Depression     Fatigue     Fibromyalgia     Hyperlipidemia     Hypertension     Hypothyroid     Levodopa-induced dyskinesia     Lumbago     Muscular wasting and disuse atrophy     Myalgia     Osteoarthritis     [facility]ans ([facility]     Parkinson's disease ([facility]     Sepsis ([facility] 2017    Spinal stenosis     Thyroid disease     Urinary frequency      History reviewed. No pertinent surgical history.   Precautions:  Fall,IV,catheter  Restrictions/Precautions: Fall Risk (1:1 d.t fall out of bed this morning)    Evaluation and Pt. rights have been reviewed: [x]Yes   [] No   If no why not:   Falls safety interventions in place  [x]Yes   [] No    Comments:     Subjective: Pt seen with 1:1 present    Prior living arrangement: Pt lived in [address] [place] [address] setting with spouse   Support contact: Spouse    Pt lives: [] Alone   [x] With spouse   [] Other   Comment:    Home: [x] Single level   []  Two level   []  Split level     []  Apartment:     Entrance:  Stairs:   Hand rails  ,    Inside: Stairs:   Hand rails:    Bathroom: [] Bath tub   [] Tub/Shower combo   [x]  Shower stall    Location:      DME: [x] W/W   [] Jenny Max   [] Rollator   [x]  W/C   [x] Eugenia Broom   [x] Shower Chair   [] [facility]  [] Dressing  AE  [] Other:      Previous Functional Status:  Pt was assist with adl completion    Pain:   Start of session: 0/10  Description:    Location:    End of session: 0/10  Action: [x] No Action Necessary    [] Patient reports pain at acceptable level for treatment  [] Nursing notified    [] Other      Objective:  Observation:  Pt supine in

## 2018-02-19 LAB
ALBUMIN SERPL-MCNC: 3.4 G/DL (ref 3.9–4.9)
ALP BLD-CCNC: 350 U/L (ref 40–130)
ALT SERPL-CCNC: 18 U/L (ref 0–33)
AST SERPL-CCNC: 20 U/L (ref 0–35)
BILIRUB SERPL-MCNC: 0.3 MG/DL (ref 0–1.2)
BILIRUBIN DIRECT: 0 MG/DL (ref 0–0.3)
BILIRUBIN, INDIRECT: 0.3 MG/DL (ref 0–0.6)
BLOOD CULTURE, ROUTINE: NORMAL
CULTURE, BLOOD 2: NORMAL
TOTAL PROTEIN: 5.8 G/DL (ref 6.4–8.1)

## 2018-02-19 PROCEDURE — 6370000000 HC RX 637 (ALT 250 FOR IP): Performed by: INTERNAL MEDICINE

## 2018-02-19 PROCEDURE — 6370000000 HC RX 637 (ALT 250 FOR IP): Performed by: PSYCHIATRY & NEUROLOGY

## 2018-02-19 PROCEDURE — 99232 SBSQ HOSP IP/OBS MODERATE 35: CPT | Performed by: INTERNAL MEDICINE

## 2018-02-19 PROCEDURE — 80076 HEPATIC FUNCTION PANEL: CPT

## 2018-02-19 PROCEDURE — 6360000002 HC RX W HCPCS: Performed by: INTERNAL MEDICINE

## 2018-02-19 PROCEDURE — 92526 ORAL FUNCTION THERAPY: CPT

## 2018-02-19 PROCEDURE — 97535 SELF CARE MNGMENT TRAINING: CPT

## 2018-02-19 PROCEDURE — 2500000003 HC RX 250 WO HCPCS: Performed by: NURSE PRACTITIONER

## 2018-02-19 PROCEDURE — 6370000000 HC RX 637 (ALT 250 FOR IP): Performed by: NURSE PRACTITIONER

## 2018-02-19 PROCEDURE — 2580000003 HC RX 258: Performed by: INTERNAL MEDICINE

## 2018-02-19 PROCEDURE — 2500000003 HC RX 250 WO HCPCS: Performed by: INTERNAL MEDICINE

## 2018-02-19 PROCEDURE — 2060000000 HC ICU INTERMEDIATE R&B

## 2018-02-19 PROCEDURE — 36415 COLL VENOUS BLD VENIPUNCTURE: CPT

## 2018-02-19 PROCEDURE — 2580000003 HC RX 258: Performed by: NURSE PRACTITIONER

## 2018-02-19 RX ORDER — LABETALOL HYDROCHLORIDE 5 MG/ML
10 INJECTION, SOLUTION INTRAVENOUS EVERY 6 HOURS PRN
Status: DISCONTINUED | OUTPATIENT
Start: 2018-02-19 | End: 2018-02-20 | Stop reason: HOSPADM

## 2018-02-19 RX ORDER — METOPROLOL TARTRATE 50 MG/1
50 TABLET, FILM COATED ORAL 2 TIMES DAILY
Status: DISCONTINUED | OUTPATIENT
Start: 2018-02-19 | End: 2018-02-20 | Stop reason: HOSPADM

## 2018-02-19 RX ADMIN — DEXTROSE AND SODIUM CHLORIDE: 5; 450 INJECTION, SOLUTION INTRAVENOUS at 13:41

## 2018-02-19 RX ADMIN — CARBIDOPA AND LEVODOPA 1 TABLET: 25; 100 TABLET ORAL at 20:30

## 2018-02-19 RX ADMIN — AMIODARONE HYDROCHLORIDE 200 MG: 200 TABLET ORAL at 08:03

## 2018-02-19 RX ADMIN — MAGNESIUM HYDROXIDE 30 ML: 400 SUSPENSION ORAL at 18:02

## 2018-02-19 RX ADMIN — CIPROFLOXACIN 400 MG: 2 INJECTION, SOLUTION INTRAVENOUS at 20:31

## 2018-02-19 RX ADMIN — METOPROLOL TARTRATE 50 MG: 50 TABLET ORAL at 20:31

## 2018-02-19 RX ADMIN — PHENAZOPYRIDINE HYDROCHLORIDE 200 MG: 200 TABLET ORAL at 13:08

## 2018-02-19 RX ADMIN — Medication 10 ML: at 20:37

## 2018-02-19 RX ADMIN — LABETALOL HYDROCHLORIDE 10 MG: 5 INJECTION INTRAVENOUS at 19:09

## 2018-02-19 RX ADMIN — LISINOPRIL 5 MG: 5 TABLET ORAL at 08:03

## 2018-02-19 RX ADMIN — ASPIRIN 81 MG CHEWABLE TABLET 81 MG: 81 TABLET CHEWABLE at 08:03

## 2018-02-19 RX ADMIN — METOPROLOL TARTRATE 50 MG: 50 TABLET ORAL at 08:03

## 2018-02-19 RX ADMIN — KETOROLAC TROMETHAMINE 15 MG: 15 INJECTION, SOLUTION INTRAMUSCULAR; INTRAVENOUS at 02:15

## 2018-02-19 RX ADMIN — CARBIDOPA AND LEVODOPA 1 TABLET: 25; 100 TABLET ORAL at 13:08

## 2018-02-19 RX ADMIN — QUETIAPINE FUMARATE 12.5 MG: 25 TABLET ORAL at 08:04

## 2018-02-19 RX ADMIN — METOPROLOL TARTRATE 5 MG: 5 INJECTION, SOLUTION INTRAVENOUS at 03:03

## 2018-02-19 RX ADMIN — KETOROLAC TROMETHAMINE 15 MG: 15 INJECTION, SOLUTION INTRAMUSCULAR; INTRAVENOUS at 08:03

## 2018-02-19 RX ADMIN — PHENAZOPYRIDINE HYDROCHLORIDE 200 MG: 200 TABLET ORAL at 17:56

## 2018-02-19 RX ADMIN — CARBIDOPA AND LEVODOPA 1 TABLET: 25; 100 TABLET ORAL at 08:15

## 2018-02-19 RX ADMIN — DEXTROSE AND SODIUM CHLORIDE: 5; 450 INJECTION, SOLUTION INTRAVENOUS at 02:15

## 2018-02-19 RX ADMIN — CIPROFLOXACIN 400 MG: 2 INJECTION, SOLUTION INTRAVENOUS at 08:03

## 2018-02-19 RX ADMIN — PAROXETINE HYDROCHLORIDE 30 MG: 30 TABLET, FILM COATED ORAL at 08:03

## 2018-02-19 RX ADMIN — LORAZEPAM 0.5 MG: 2 INJECTION INTRAMUSCULAR; INTRAVENOUS at 04:02

## 2018-02-19 RX ADMIN — PHENAZOPYRIDINE HYDROCHLORIDE 200 MG: 200 TABLET ORAL at 08:03

## 2018-02-19 RX ADMIN — QUETIAPINE FUMARATE 12.5 MG: 25 TABLET ORAL at 20:31

## 2018-02-19 RX ADMIN — Medication 10 ML: at 08:04

## 2018-02-19 ASSESSMENT — PAIN SCALES - GENERAL
PAINLEVEL_OUTOF10: 4
PAINLEVEL_OUTOF10: 5
PAINLEVEL_OUTOF10: 0

## 2018-02-19 NOTE — PROGRESS NOTES
Speech Language Pathology  Facility/Department: [facility]  Dysphagia Daily Treatment Note    NAME: Ernie Wright  : 1940  MRN: [id number]    Time in: 1200  Time out: 1210   (meal monitor)    Patient Diagnosis(es):   Patient Active Problem List    Diagnosis Date Noted    Tachycardia 2018     Priority: High    PSVT (paroxysmal supraventricular tachycardia) ([facility] 2017     Priority: High    Altered mental status      Priority: High    Delirium     SVT (supraventricular tachycardia) ([facility] 02/15/2018    Hypothyroidism due to medication     Altered mental state 2018    SOB (shortness of breath) 2018    Chronic pain 2018    Age-related physical debility 2018    Acute cystitis without hematuria     Fibromyalgia 2017    Essential hypertension 2017    Parkinson disease ([facility] 2017    Hypothyroidism     Hyperglycemia 10/27/2017    Urinary retention 10/27/2017     Allergies: Allergies   Allergen Reactions    Latex     Penicillins     Vancomycin      Onset Date: 2-15-18      Pain:  Pain Assessment  Patient Currently in Pain: No  Pain Level: 0    Diet Tolerance:  Patient tolerating current diet level with no overt signs/symptoms of penetration/aspiration. P.O. Trials: Thin   x4  Pt drank thin cranberry juice from spoon (by choice) x4. Pt tolerated thin liquids from spoon with no overt s/s of aspiration. A cup of water with a straw was available on pt's table but pt refused trials with straw. Nectar       Honey       Puree   x3 Patient tolerated 1/2 teaspoon bites of pudding x3 with no overt s/s of aspiration. Solid   x8 Mechanical soft x8 (macaroni and cheese, ground chicken). Pt tolerated bites of mechanical soft food with no overt s/s aspiration. Impressions:  Pt eating, reclined in bed upon arrival. SLP increased incline of bed. Oral Motor / Bolus Control:  Increased mastication time noted for mechanical soft solids.

## 2018-02-19 NOTE — PROGRESS NOTES
[facility]. Electronically signed by Maddy Luis on 2/19/2018 at 6:10 PM

## 2018-02-19 NOTE — PROGRESS NOTES
Physical Therapy  Facility/Department: [facility] MED SURG N712/[id number]  Daily Progress Note    NAME: Melinda Valdes    : 1940 (70 y.o.)  MRN: [id number]    Account: [de-identified]  Gender: female    Date of Service: 18    Patient Diagnosis(es):   Patient Active Problem List    Diagnosis Date Noted    Tachycardia 2018     Priority: High    PSVT (paroxysmal supraventricular tachycardia) ([facility] 2017     Priority: High    Altered mental status      Priority: High    Delirium     [facility] 02/15/2018    Hypothyroidism due to medication     Altered mental state 2018    SOB (shortness of breath) 2018    Chronic pain 2018    Age-related physical debility 2018    Acute cystitis without hematuria     Fibromyalgia 2017    Essential hypertension 2017    Parkinson disease ([facility] 2017    Hypothyroidism     Hyperglycemia 10/27/2017    Urinary retention 10/27/2017       Precautions/Weight Bearing Restrictions: Full weight bearing,   Restrictions/Precautions: Fall Risk (1:1 d.t fall out of bed this morning)  Falls Safety Armband Present:  [x] Yes  [] No    SUBJECTIVE: Pt Hungarian speaking and does not understand any english.  #[id number] used via blue phones. Pain:   Initial Pain level: none   Location: NA   Description: N/A  Action:  [x]  Pt declined intervention  [] Nursing notified     [x] Pain acceptable level for treatment  [] Other:      Reassessment of Pain: none   Location: NA   Description: N/A  Action:  [x]  Pt declined intervention  [] Nursing notified     [x] Pain acceptable level for treatment  [] Other:    OBJECTIVE:     Bed Mobility:   Rolling: - Maximal Assist  Supine to Sit: -  Maximal Assist   Sit to Supine: -  Maximal Assist    +2 for safety. Hand over hand placement for guidance. Visual cues used in combination of blue phone. Transfers:   NT d/t safety at this time.     Gait/Ambulation:

## 2018-02-19 NOTE — PLAN OF CARE
Problem: Risk for Impaired Skin Integrity  Goal: Tissue integrity - skin and mucous membranes  Structural intactness and normal physiological function of skin and  mucous membranes.    Outcome: Ongoing      Problem: Falls - Risk of  Goal: Absence of falls  Outcome: Ongoing      Problem: Infection:  Goal: Will remain free from infection  Will remain free from infection   Outcome: Ongoing      Problem: Safety:  Goal: Free from accidental physical injury  Free from accidental physical injury   Outcome: Ongoing    Goal: Free from intentional harm  Free from intentional harm   Outcome: Ongoing      Problem: Daily Care:  Goal: Daily care needs are met  Daily care needs are met   Outcome: Ongoing      Problem: Pain:  Goal: Patient's pain/discomfort is manageable  Patient's pain/discomfort is manageable   Outcome: Ongoing    Goal: Pain level will decrease  Pain level will decrease   Outcome: Ongoing    Goal: Control of acute pain  Control of acute pain   Outcome: Ongoing    Goal: Control of chronic pain  Control of chronic pain   Outcome: Ongoing      Problem: Skin Integrity:  Goal: Skin integrity will stabilize  Skin integrity will stabilize   Outcome: Ongoing      Problem: Discharge Planning:  Goal: Patients continuum of care needs are met  Patients continuum of care needs are met   Outcome: Ongoing      Problem: IP SWALLOWING  Goal: LTG - patient will tolerate the least restrictive diet consistency to allow for safe consumption of daily meals  Outcome: Ongoing      Problem: Mobility - Impaired:  Goal: Mobility will improve  Mobility will improve   Outcome: Ongoing

## 2018-02-20 VITALS
SYSTOLIC BLOOD PRESSURE: 153 MMHG | TEMPERATURE: 99 F | HEART RATE: 58 BPM | OXYGEN SATURATION: 96 % | RESPIRATION RATE: 18 BRPM | WEIGHT: 122 LBS | BODY MASS INDEX: 22.31 KG/M2 | DIASTOLIC BLOOD PRESSURE: 59 MMHG

## 2018-02-20 LAB
ALBUMIN SERPL-MCNC: 3.6 G/DL (ref 3.9–4.9)
ALP BLD-CCNC: 340 U/L (ref 40–130)
ALT SERPL-CCNC: 20 U/L (ref 0–33)
AMIODARONE LEVEL: <0.3 UG/ML (ref 0.5–2)
AST SERPL-CCNC: 19 U/L (ref 0–35)
BILIRUB SERPL-MCNC: 0.5 MG/DL (ref 0–1.2)
BILIRUBIN DIRECT: 0 MG/DL (ref 0–0.3)
BILIRUBIN, INDIRECT: 0.5 MG/DL (ref 0–0.6)
DES-AMIOD: <0.3 UG/ML
EKG ATRIAL RATE: 105 BPM
EKG ATRIAL RATE: 170 BPM
EKG ATRIAL RATE: 68 BPM
EKG ATRIAL RATE: 77 BPM
EKG ATRIAL RATE: 80 BPM
EKG ATRIAL RATE: 89 BPM
EKG ATRIAL RATE: 91 BPM
EKG P AXIS: 31 DEGREES
EKG P AXIS: 39 DEGREES
EKG P AXIS: 46 DEGREES
EKG P AXIS: 56 DEGREES
EKG P AXIS: 65 DEGREES
EKG P AXIS: 67 DEGREES
EKG P-R INTERVAL: 128 MS
EKG P-R INTERVAL: 136 MS
EKG P-R INTERVAL: 138 MS
EKG P-R INTERVAL: 142 MS
EKG P-R INTERVAL: 146 MS
EKG P-R INTERVAL: 156 MS
EKG Q-T INTERVAL: 312 MS
EKG Q-T INTERVAL: 342 MS
EKG Q-T INTERVAL: 358 MS
EKG Q-T INTERVAL: 374 MS
EKG Q-T INTERVAL: 376 MS
EKG Q-T INTERVAL: 378 MS
EKG Q-T INTERVAL: 422 MS
EKG QRS DURATION: 72 MS
EKG QRS DURATION: 74 MS
EKG QRS DURATION: 74 MS
EKG QRS DURATION: 76 MS
EKG QRS DURATION: 76 MS
EKG QRS DURATION: 82 MS
EKG QRS DURATION: 82 MS
EKG QTC CALCULATION (BAZETT): 405 MS
EKG QTC CALCULATION (BAZETT): 433 MS
EKG QTC CALCULATION (BAZETT): 448 MS
EKG QTC CALCULATION (BAZETT): 452 MS
EKG QTC CALCULATION (BAZETT): 455 MS
EKG QTC CALCULATION (BAZETT): 464 MS
EKG QTC CALCULATION (BAZETT): 513 MS
EKG R AXIS: -23 DEGREES
EKG R AXIS: -26 DEGREES
EKG R AXIS: -28 DEGREES
EKG R AXIS: -28 DEGREES
EKG R AXIS: -35 DEGREES
EKG R AXIS: -36 DEGREES
EKG R AXIS: -6 DEGREES
EKG T AXIS: -6 DEGREES
EKG T AXIS: 14 DEGREES
EKG T AXIS: 15 DEGREES
EKG T AXIS: 22 DEGREES
EKG T AXIS: 23 DEGREES
EKG T AXIS: 265 DEGREES
EKG T AXIS: 9 DEGREES
EKG VENTRICULAR RATE: 105 BPM
EKG VENTRICULAR RATE: 163 BPM
EKG VENTRICULAR RATE: 68 BPM
EKG VENTRICULAR RATE: 77 BPM
EKG VENTRICULAR RATE: 80 BPM
EKG VENTRICULAR RATE: 89 BPM
EKG VENTRICULAR RATE: 91 BPM
TOTAL PROTEIN: 6.2 G/DL (ref 6.4–8.1)

## 2018-02-20 PROCEDURE — 6370000000 HC RX 637 (ALT 250 FOR IP): Performed by: INTERNAL MEDICINE

## 2018-02-20 PROCEDURE — 93010 ELECTROCARDIOGRAM REPORT: CPT | Performed by: INTERNAL MEDICINE

## 2018-02-20 PROCEDURE — 6360000002 HC RX W HCPCS: Performed by: INTERNAL MEDICINE

## 2018-02-20 PROCEDURE — 2580000003 HC RX 258: Performed by: INTERNAL MEDICINE

## 2018-02-20 PROCEDURE — 80076 HEPATIC FUNCTION PANEL: CPT

## 2018-02-20 PROCEDURE — 93005 ELECTROCARDIOGRAM TRACING: CPT

## 2018-02-20 PROCEDURE — 36415 COLL VENOUS BLD VENIPUNCTURE: CPT

## 2018-02-20 PROCEDURE — 6370000000 HC RX 637 (ALT 250 FOR IP): Performed by: PSYCHIATRY & NEUROLOGY

## 2018-02-20 RX ORDER — QUETIAPINE FUMARATE 25 MG/1
12.5 TABLET, FILM COATED ORAL 2 TIMES DAILY PRN
Qty: 60 TABLET | Refills: 3 | Status: SHIPPED | OUTPATIENT
Start: 2018-02-20

## 2018-02-20 RX ORDER — QUETIAPINE FUMARATE 25 MG/1
12.5 TABLET, FILM COATED ORAL 2 TIMES DAILY
Qty: 60 TABLET | Refills: 3 | Status: SHIPPED | OUTPATIENT
Start: 2018-02-20 | End: 2018-07-26 | Stop reason: ALTCHOICE

## 2018-02-20 RX ADMIN — QUETIAPINE FUMARATE 12.5 MG: 25 TABLET ORAL at 08:55

## 2018-02-20 RX ADMIN — PHENAZOPYRIDINE HYDROCHLORIDE 200 MG: 200 TABLET ORAL at 13:53

## 2018-02-20 RX ADMIN — PAROXETINE HYDROCHLORIDE 30 MG: 30 TABLET, FILM COATED ORAL at 08:54

## 2018-02-20 RX ADMIN — PHENAZOPYRIDINE HYDROCHLORIDE 200 MG: 200 TABLET ORAL at 08:54

## 2018-02-20 RX ADMIN — DEXTROSE AND SODIUM CHLORIDE: 5; 450 INJECTION, SOLUTION INTRAVENOUS at 09:52

## 2018-02-20 RX ADMIN — AMIODARONE HYDROCHLORIDE 200 MG: 200 TABLET ORAL at 08:55

## 2018-02-20 RX ADMIN — CARBIDOPA AND LEVODOPA 1 TABLET: 25; 100 TABLET ORAL at 13:55

## 2018-02-20 RX ADMIN — LORAZEPAM 0.5 MG: 2 INJECTION INTRAMUSCULAR; INTRAVENOUS at 08:54

## 2018-02-20 RX ADMIN — CARBIDOPA AND LEVODOPA 1 TABLET: 25; 100 TABLET ORAL at 08:54

## 2018-02-20 RX ADMIN — METOPROLOL TARTRATE 50 MG: 50 TABLET ORAL at 08:54

## 2018-02-20 RX ADMIN — CIPROFLOXACIN 400 MG: 2 INJECTION, SOLUTION INTRAVENOUS at 09:02

## 2018-02-20 RX ADMIN — ASPIRIN 81 MG CHEWABLE TABLET 81 MG: 81 TABLET CHEWABLE at 08:54

## 2018-02-20 RX ADMIN — LISINOPRIL 5 MG: 5 TABLET ORAL at 08:55

## 2018-02-20 ASSESSMENT — PAIN SCALES - GENERAL
PAINLEVEL_OUTOF10: 0

## 2018-02-20 NOTE — PROGRESS NOTES
Called patient's son Jannette Alonzo to let him know about patient's transfer to [facility] this evening. He is to let his father know. Report called to Dorn Severs at [facility].  Electronically signed by Driss Alcantara RN on 2/20/2018 at 4:09 PM

## 2018-02-20 NOTE — PROGRESS NOTES
Occupational Therapy  Facility/Department: [facility]  Daily Treatment Note  NAME: Ernie Wright  : 1940  MRN: [id number]    Date of Service: 2018    Patient Diagnosis(es): The primary encounter diagnosis was Delirium. A diagnosis of Elevated troponin was also pertinent to this visit. has a past medical history of Depression; Fatigue; Fibromyalgia; Hyperlipidemia; Hypertension; Hypothyroid; Levodopa-induced dyskinesia; Lumbago; Muscular wasting and disuse atrophy; Myalgia; Osteoarthritis; Paralysis agitans ([address] Parkinson's disease ([address] Sepsis ([address] Spinal stenosis; Thyroid disease; and Urinary frequency. has no past surgical history on file. Restrictions  Restrictions/Precautions  Restrictions/Precautions: Fall Risk  Required Braces or Orthoses?: No  Subjective   General  Patient assessed for rehabilitation services?: Yes  Additional Pertinent Hx: Patient is tachycardic and has increased BP and LPN Cordelia May states there is a call out to the MD. Patient will be on hold until cleared by the MD.           Objective No treatment rendered at this time. Assessment      Safety Devices  Safety Devices in place: Yes (Patient has 1:1 Cordelia May, LPN)       Discharge Recommendations:  Continue to assess pending progress, Subacute/Skilled  [facility]  Times per week: 1-4x/wk  G-Code     OutComes Score                                           AM-PAC Score             Goals          Therapy Time  No treatment rendered.    Individual Concurrent Group Co-treatment   Time In           Time Out           Minutes              Reason: Illness (Patient is tachycardic and increased BP.)    SCAR Kirk  Electronically signed by FLO Kirk on 18 at 1:17 PM

## 2018-02-21 ENCOUNTER — TELEPHONE (OUTPATIENT)
Dept: GASTROENTEROLOGY | Age: 78
End: 2018-02-21

## 2018-02-21 NOTE — DISCHARGE SUMMARY
cells:  Normal. Visualized Orbits:  Normal.     Impression: Mild cerebral atrophy. Physiologic calcification basal ganglia unchanged. No acute findings. . All CT scans at this facility use dose modulation, iterative reconstruction, and/or weight based dosing when appropriate to reduce radiation dose to as low as reasonably achievable. Xr Chest Portable    Result Date: 2/14/2018  Chest one view. HISTORY: Altered mental status. FINDINGS: Comparison, January 3, 2018. Image obtained in partial expiration. Osseous structures intact. Cardiopericardial silhouette is enlarged. Aorta calcified. Lungs clear. Cardiomegaly. Us Abdomen Limited    Result Date: 2/15/2018  EXAMINATION: US ABDOMEN LIMITED DATE AND TIME:2/15/2018 6:45 AM CLINICAL HISTORY: Epigastric pain   elevated labs  COMPARISON: None TECHNIQUE: Gray-scale evaluation of the right upper quadrant was performed. FINDINGS:            Liver: No focal hepatic lesions. Mild biliary ectasia is again seen. This is unchanged from CT scans dating back to 2010 consistent with the patient's baseline status. .         Gallbladder: Patient is status post cholecystectomy. The common duct measures up to 6 mm. Pancreas: Was poorly visualized due to overlying bowel gas     No acute pathology.        Discharge Medications:       Daryl, [facility] Medication Instructions CLR:[id number]    Printed on:02/21/18 4099   Medication Information                      albuterol sulfate HFA (VENTOLIN HFA) 108 (90 Base) MCG/ACT inhaler  Inhale 2 puffs into the lungs every 6 hours as needed for Wheezing             amiodarone (CORDARONE) 200 MG tablet  Take 1 tablet by mouth daily             aspirin 81 MG tablet  Take 81 mg by mouth daily             busPIRone (BUSPAR) 10 MG tablet  Take 10 mg by mouth nightly             calcium carbonate (OSCAL) 500 MG TABS tablet  Take 500 mg by mouth 2 times daily             carbidopa-levodopa (SINEMET)  MG per tablet  Take

## 2018-02-21 NOTE — TELEPHONE ENCOUNTER
Rudy Birmingham from [facility] called to schedule patient for a EUS per Dr Morris Head notes. She also noted that the patient was to have a ERCP and per Dr Morris Head note when consulted with Dr Helen Woodall patient was to be sent to [facility]. Explained to Rudy Birmingham, scheduling for Dr Helen Woodall is on hold currently and we should know more at the end of the week.      Pt was being discharge on 2/20 to [address] [place] [address]

## 2018-03-09 ENCOUNTER — OFFICE VISIT (OUTPATIENT)
Dept: PALLATIVE CARE | Age: 78
End: 2018-03-09
Payer: MEDICARE

## 2018-03-09 VITALS — HEART RATE: 56 BPM | SYSTOLIC BLOOD PRESSURE: 92 MMHG | DIASTOLIC BLOOD PRESSURE: 53 MMHG | RESPIRATION RATE: 16 BRPM

## 2018-03-09 DIAGNOSIS — R54 AGE-RELATED PHYSICAL DEBILITY: ICD-10-CM

## 2018-03-09 DIAGNOSIS — K59.01 SLOW TRANSIT CONSTIPATION: ICD-10-CM

## 2018-03-09 DIAGNOSIS — Z51.5 PALLIATIVE CARE ENCOUNTER: ICD-10-CM

## 2018-03-09 DIAGNOSIS — F41.9 ANXIETY: Primary | ICD-10-CM

## 2018-03-09 PROCEDURE — 99308 SBSQ NF CARE LOW MDM 20: CPT | Performed by: NURSE PRACTITIONER

## 2018-03-09 ASSESSMENT — ENCOUNTER SYMPTOMS
NAUSEA: 0
ABDOMINAL PAIN: 0
BLOOD IN STOOL: 0
VOMITING: 0
ABDOMINAL DISTENTION: 0
SHORTNESS OF BREATH: 0
TROUBLE SWALLOWING: 0
CHOKING: 0
WHEEZING: 0
CHEST TIGHTNESS: 0
DIARRHEA: 0
SORE THROAT: 0
CONSTIPATION: 1
COUGH: 0
COLOR CHANGE: 0

## 2018-03-09 NOTE — PROGRESS NOTES
swallowing. Eyes: Negative for visual disturbance. Respiratory: Negative for cough, choking, chest tightness, shortness of breath and wheezing. Cardiovascular: Negative for chest pain, palpitations and leg swelling. Gastrointestinal: Positive for constipation. Negative for abdominal distention, abdominal pain, blood in stool, diarrhea, nausea and vomiting. Genitourinary: Negative for dysuria, flank pain and hematuria. Musculoskeletal: Negative for arthralgias, joint swelling and myalgias. Skin: Negative for color change, rash and wound. Neurological: Positive for weakness. Negative for dizziness, seizures, syncope, light-headedness, numbness and headaches. Psychiatric/Behavioral: Positive for agitation. Negative for behavioral problems, confusion, dysphoric mood, hallucinations, sleep disturbance and suicidal ideas. The patient is not nervous/anxious. Negative existential symptoms           Objective:   BP (!) 92/53   Pulse 56   Resp 16     Physical Exam   Constitutional: She is oriented to person, place, and time. She is cooperative. No distress. Chronically ill appearing   HENT:   Head: Normocephalic and atraumatic. Nose: Nose normal.   Mouth/Throat: Oropharynx is clear and moist.   Eyes: Conjunctivae are normal. No scleral icterus. Neck: Normal range of motion. Neck supple. No JVD present. No thyromegaly present. Cardiovascular: Normal rate, regular rhythm and intact distal pulses. Pulmonary/Chest: Effort normal and breath sounds normal.   Abdominal: Soft. Bowel sounds are normal. She exhibits no distension. There is no tenderness. Musculoskeletal: She exhibits no edema or tenderness. Able to move all 4 extremities   Lymphadenopathy:     She has no cervical adenopathy. Neurological: She is alert and oriented to person, place, and time. Skin: Skin is warm and dry. No rash noted. Psychiatric: She has a normal mood and affect.  Her behavior is normal. Judgment and

## 2018-04-04 ENCOUNTER — OFFICE VISIT (OUTPATIENT)
Dept: PALLATIVE CARE | Age: 78
End: 2018-04-04
Payer: MEDICARE

## 2018-04-04 VITALS
HEART RATE: 70 BPM | TEMPERATURE: 97.7 F | RESPIRATION RATE: 18 BRPM | OXYGEN SATURATION: 99 % | DIASTOLIC BLOOD PRESSURE: 58 MMHG | SYSTOLIC BLOOD PRESSURE: 100 MMHG

## 2018-04-04 DIAGNOSIS — R33.9 URINARY RETENTION: ICD-10-CM

## 2018-04-04 DIAGNOSIS — K59.01 SLOW TRANSIT CONSTIPATION: Primary | ICD-10-CM

## 2018-04-04 DIAGNOSIS — Z51.5 PALLIATIVE CARE ENCOUNTER: ICD-10-CM

## 2018-04-04 DIAGNOSIS — Z71.89 OTHER SPECIFIED COUNSELING: ICD-10-CM

## 2018-04-04 PROCEDURE — 99350 HOME/RES VST EST HIGH MDM 60: CPT | Performed by: NURSE PRACTITIONER

## 2018-04-04 RX ORDER — SOLIFENACIN SUCCINATE 10 MG/1
TABLET, FILM COATED ORAL
Refills: 0 | COMMUNITY
Start: 2018-01-02

## 2018-04-04 RX ORDER — DOCUSATE SODIUM 100 MG/1
CAPSULE ORAL
Refills: 0 | COMMUNITY
Start: 2018-01-06 | End: 2018-09-06 | Stop reason: ALTCHOICE

## 2018-04-04 RX ORDER — ALENDRONATE SODIUM 70 MG/1
TABLET ORAL
Refills: 0 | COMMUNITY
Start: 2018-01-06

## 2018-04-04 RX ORDER — LEVOTHYROXINE SODIUM 0.1 MG/1
TABLET ORAL
Refills: 0 | COMMUNITY
Start: 2018-01-01 | End: 2018-07-26 | Stop reason: ALTCHOICE

## 2018-04-04 ASSESSMENT — ENCOUNTER SYMPTOMS
VOMITING: 0
TROUBLE SWALLOWING: 0
ABDOMINAL DISTENTION: 0
STRIDOR: 0
DIARRHEA: 0
ABDOMINAL PAIN: 0
CONSTIPATION: 0
COLOR CHANGE: 0
BACK PAIN: 0
SORE THROAT: 0
BLOOD IN STOOL: 0
EYE DISCHARGE: 0
COUGH: 0
CHEST TIGHTNESS: 0
CHOKING: 0
APNEA: 0
ANAL BLEEDING: 0
RECTAL PAIN: 0
SHORTNESS OF BREATH: 0
NAUSEA: 0
WHEEZING: 0
VOICE CHANGE: 0

## 2018-04-05 ENCOUNTER — TELEPHONE (OUTPATIENT)
Dept: PALLATIVE CARE | Age: 78
End: 2018-04-05

## 2018-04-05 DIAGNOSIS — Z91.81 AT HIGH RISK FOR FALLS: ICD-10-CM

## 2018-04-05 DIAGNOSIS — M62.81 MUSCLE WEAKNESS (GENERALIZED): ICD-10-CM

## 2018-04-05 DIAGNOSIS — G20 PARKINSON'S DISEASE (TREMOR, STIFFNESS, SLOW MOTION, UNSTABLE POSTURE) (HCC): Primary | ICD-10-CM

## 2018-04-05 DIAGNOSIS — G62.9 NEUROPATHY: ICD-10-CM

## 2018-04-05 DIAGNOSIS — R26.81 GAIT INSTABILITY: ICD-10-CM

## 2018-04-05 DIAGNOSIS — M25.50 ARTHRALGIA, UNSPECIFIED JOINT: ICD-10-CM

## 2018-04-11 ENCOUNTER — TELEPHONE (OUTPATIENT)
Dept: PALLATIVE CARE | Age: 78
End: 2018-04-11

## 2018-05-16 ENCOUNTER — OFFICE VISIT (OUTPATIENT)
Dept: PALLATIVE CARE | Age: 78
End: 2018-05-16
Payer: MEDICARE

## 2018-05-16 VITALS
OXYGEN SATURATION: 99 % | DIASTOLIC BLOOD PRESSURE: 70 MMHG | HEART RATE: 70 BPM | TEMPERATURE: 98.2 F | SYSTOLIC BLOOD PRESSURE: 120 MMHG | RESPIRATION RATE: 16 BRPM

## 2018-05-16 DIAGNOSIS — K59.01 SLOW TRANSIT CONSTIPATION: Primary | ICD-10-CM

## 2018-05-16 DIAGNOSIS — Z51.5 PALLIATIVE CARE ENCOUNTER: ICD-10-CM

## 2018-05-16 DIAGNOSIS — R10.13 EPIGASTRIC ABDOMINAL PAIN: ICD-10-CM

## 2018-05-16 PROCEDURE — 1036F TOBACCO NON-USER: CPT | Performed by: NURSE PRACTITIONER

## 2018-05-16 PROCEDURE — 99348 HOME/RES VST EST LOW MDM 30: CPT | Performed by: NURSE PRACTITIONER

## 2018-05-16 PROCEDURE — 1123F ACP DISCUSS/DSCN MKR DOCD: CPT | Performed by: NURSE PRACTITIONER

## 2018-05-16 PROCEDURE — 1090F PRES/ABSN URINE INCON ASSESS: CPT | Performed by: NURSE PRACTITIONER

## 2018-05-16 PROCEDURE — G8420 CALC BMI NORM PARAMETERS: HCPCS | Performed by: NURSE PRACTITIONER

## 2018-05-16 PROCEDURE — 4040F PNEUMOC VAC/ADMIN/RCVD: CPT | Performed by: NURSE PRACTITIONER

## 2018-05-16 ASSESSMENT — ENCOUNTER SYMPTOMS
COUGH: 0
VOICE CHANGE: 0
EYE DISCHARGE: 0
NAUSEA: 0
CHOKING: 0
SHORTNESS OF BREATH: 0
SORE THROAT: 0
CONSTIPATION: 1
BACK PAIN: 0
TROUBLE SWALLOWING: 0
DIARRHEA: 0
APNEA: 0
COLOR CHANGE: 0
ABDOMINAL DISTENTION: 0
BLOOD IN STOOL: 0
CHEST TIGHTNESS: 0
VOMITING: 0
RECTAL PAIN: 0
ABDOMINAL PAIN: 1
ANAL BLEEDING: 0
STRIDOR: 0
WHEEZING: 0

## 2018-06-20 ENCOUNTER — OFFICE VISIT (OUTPATIENT)
Dept: CARDIOLOGY CLINIC | Age: 78
End: 2018-06-20
Payer: MEDICARE

## 2018-06-20 VITALS
RESPIRATION RATE: 16 BRPM | HEART RATE: 49 BPM | SYSTOLIC BLOOD PRESSURE: 86 MMHG | OXYGEN SATURATION: 92 % | DIASTOLIC BLOOD PRESSURE: 50 MMHG

## 2018-06-20 DIAGNOSIS — R00.0 TACHYCARDIA: ICD-10-CM

## 2018-06-20 DIAGNOSIS — R06.02 SOB (SHORTNESS OF BREATH): ICD-10-CM

## 2018-06-20 DIAGNOSIS — I47.1 PSVT (PAROXYSMAL SUPRAVENTRICULAR TACHYCARDIA) (HCC): ICD-10-CM

## 2018-06-20 DIAGNOSIS — I10 ESSENTIAL HYPERTENSION: ICD-10-CM

## 2018-06-20 LAB
ANION GAP SERPL CALCULATED.3IONS-SCNC: 15 MEQ/L (ref 7–13)
BUN BLDV-MCNC: 16 MG/DL (ref 8–23)
CALCIUM SERPL-MCNC: 9.4 MG/DL (ref 8.6–10.2)
CHLORIDE BLD-SCNC: 104 MEQ/L (ref 98–107)
CO2: 26 MEQ/L (ref 22–29)
CREAT SERPL-MCNC: 0.78 MG/DL (ref 0.5–0.9)
GFR AFRICAN AMERICAN: >60
GFR NON-AFRICAN AMERICAN: >60
GLUCOSE BLD-MCNC: 94 MG/DL (ref 74–109)
HCT VFR BLD CALC: 34.5 % (ref 37–47)
HEMOGLOBIN: 11.5 G/DL (ref 12–16)
MAGNESIUM: 2.1 MG/DL (ref 1.7–2.3)
MCH RBC QN AUTO: 31.2 PG (ref 27–31.3)
MCHC RBC AUTO-ENTMCNC: 33.3 % (ref 33–37)
MCV RBC AUTO: 93.9 FL (ref 82–100)
PDW BLD-RTO: 14.8 % (ref 11.5–14.5)
PLATELET # BLD: 120 K/UL (ref 130–400)
POTASSIUM SERPL-SCNC: 3.8 MEQ/L (ref 3.5–5.1)
RBC # BLD: 3.67 M/UL (ref 4.2–5.4)
SODIUM BLD-SCNC: 145 MEQ/L (ref 132–144)
TSH SERPL DL<=0.05 MIU/L-ACNC: 27.53 UIU/ML (ref 0.27–4.2)
WBC # BLD: 3.4 K/UL (ref 4.8–10.8)

## 2018-06-20 PROCEDURE — 99214 OFFICE O/P EST MOD 30 MIN: CPT | Performed by: INTERNAL MEDICINE

## 2018-06-20 PROCEDURE — G8400 PT W/DXA NO RESULTS DOC: HCPCS | Performed by: INTERNAL MEDICINE

## 2018-06-20 PROCEDURE — G8420 CALC BMI NORM PARAMETERS: HCPCS | Performed by: INTERNAL MEDICINE

## 2018-06-20 PROCEDURE — 93000 ELECTROCARDIOGRAM COMPLETE: CPT | Performed by: INTERNAL MEDICINE

## 2018-06-20 PROCEDURE — 1036F TOBACCO NON-USER: CPT | Performed by: INTERNAL MEDICINE

## 2018-06-20 PROCEDURE — 1123F ACP DISCUSS/DSCN MKR DOCD: CPT | Performed by: INTERNAL MEDICINE

## 2018-06-20 PROCEDURE — 1090F PRES/ABSN URINE INCON ASSESS: CPT | Performed by: INTERNAL MEDICINE

## 2018-06-20 PROCEDURE — 4040F PNEUMOC VAC/ADMIN/RCVD: CPT | Performed by: INTERNAL MEDICINE

## 2018-06-20 PROCEDURE — G8427 DOCREV CUR MEDS BY ELIG CLIN: HCPCS | Performed by: INTERNAL MEDICINE

## 2018-06-20 RX ORDER — ALPRAZOLAM 0.5 MG/1
0.5 TABLET ORAL 3 TIMES DAILY PRN
COMMUNITY
End: 2018-09-06 | Stop reason: ALTCHOICE

## 2018-06-20 RX ORDER — MIRTAZAPINE 15 MG/1
15 TABLET, FILM COATED ORAL NIGHTLY
COMMUNITY

## 2018-06-20 RX ORDER — DIVALPROEX SODIUM 125 MG/1
125 CAPSULE, COATED PELLETS ORAL 2 TIMES DAILY
COMMUNITY

## 2018-06-20 RX ORDER — LORAZEPAM 0.5 MG/1
0.5 TABLET ORAL EVERY 6 HOURS PRN
COMMUNITY
End: 2018-09-06 | Stop reason: ALTCHOICE

## 2018-06-20 RX ORDER — HYDROCODONE BITARTRATE AND ACETAMINOPHEN 5; 325 MG/1; MG/1
1 TABLET ORAL EVERY 8 HOURS PRN
COMMUNITY

## 2018-06-20 ASSESSMENT — ENCOUNTER SYMPTOMS
NAUSEA: 0
COUGH: 0
CHEST TIGHTNESS: 0
SHORTNESS OF BREATH: 1
STRIDOR: 0
BLOOD IN STOOL: 0
EYES NEGATIVE: 1
WHEEZING: 0
GASTROINTESTINAL NEGATIVE: 1

## 2018-07-26 ENCOUNTER — OFFICE VISIT (OUTPATIENT)
Dept: PALLATIVE CARE | Age: 78
End: 2018-07-26
Payer: MEDICARE

## 2018-07-26 VITALS
WEIGHT: 116.4 LBS | RESPIRATION RATE: 18 BRPM | OXYGEN SATURATION: 97 % | HEART RATE: 84 BPM | DIASTOLIC BLOOD PRESSURE: 64 MMHG | BODY MASS INDEX: 21.29 KG/M2 | TEMPERATURE: 99.4 F | SYSTOLIC BLOOD PRESSURE: 132 MMHG

## 2018-07-26 DIAGNOSIS — K59.03 DRUG-INDUCED CONSTIPATION: Primary | ICD-10-CM

## 2018-07-26 DIAGNOSIS — Z51.5 PALLIATIVE CARE BY SPECIALIST: ICD-10-CM

## 2018-07-26 DIAGNOSIS — R45.86 LABILITY EMOTIONAL: ICD-10-CM

## 2018-07-26 DIAGNOSIS — G24.9 DYSKINESIA: ICD-10-CM

## 2018-07-26 PROCEDURE — G8427 DOCREV CUR MEDS BY ELIG CLIN: HCPCS | Performed by: NURSE PRACTITIONER

## 2018-07-26 PROCEDURE — 1036F TOBACCO NON-USER: CPT | Performed by: NURSE PRACTITIONER

## 2018-07-26 PROCEDURE — 99214 OFFICE O/P EST MOD 30 MIN: CPT | Performed by: NURSE PRACTITIONER

## 2018-07-26 PROCEDURE — G8400 PT W/DXA NO RESULTS DOC: HCPCS | Performed by: NURSE PRACTITIONER

## 2018-07-26 PROCEDURE — 1090F PRES/ABSN URINE INCON ASSESS: CPT | Performed by: NURSE PRACTITIONER

## 2018-07-26 PROCEDURE — 1123F ACP DISCUSS/DSCN MKR DOCD: CPT | Performed by: NURSE PRACTITIONER

## 2018-07-26 PROCEDURE — 1101F PT FALLS ASSESS-DOCD LE1/YR: CPT | Performed by: NURSE PRACTITIONER

## 2018-07-26 PROCEDURE — G8420 CALC BMI NORM PARAMETERS: HCPCS | Performed by: NURSE PRACTITIONER

## 2018-07-26 PROCEDURE — 4040F PNEUMOC VAC/ADMIN/RCVD: CPT | Performed by: NURSE PRACTITIONER

## 2018-07-26 RX ORDER — LEVOTHYROXINE SODIUM 0.05 MG/1
50 TABLET ORAL
COMMUNITY
Start: 2018-06-21 | End: 2018-07-26 | Stop reason: ALTCHOICE

## 2018-07-26 RX ORDER — OMEPRAZOLE 20 MG/1
40 CAPSULE, DELAYED RELEASE ORAL
COMMUNITY
Start: 2018-04-16

## 2018-07-26 RX ORDER — SENNA AND DOCUSATE SODIUM 50; 8.6 MG/1; MG/1
2 TABLET, FILM COATED ORAL 2 TIMES DAILY
Qty: 120 TABLET | Refills: 5 | Status: SHIPPED | OUTPATIENT
Start: 2018-07-26 | End: 2018-08-25

## 2018-07-26 RX ORDER — PRAMIPEXOLE DIHYDROCHLORIDE 0.5 MG/1
0.5 TABLET ORAL
COMMUNITY
Start: 2018-04-16 | End: 2018-07-26 | Stop reason: ALTCHOICE

## 2018-07-26 RX ORDER — GABAPENTIN 300 MG/1
CAPSULE ORAL
COMMUNITY
Start: 2018-04-16

## 2018-07-26 RX ORDER — SUCRALFATE 1 G/1
1 TABLET ORAL
COMMUNITY
Start: 2018-04-16 | End: 2018-09-06 | Stop reason: ALTCHOICE

## 2018-07-26 RX ORDER — BUSPIRONE HYDROCHLORIDE 10 MG/1
10 TABLET ORAL
COMMUNITY
Start: 2018-04-16 | End: 2018-07-26 | Stop reason: ALTCHOICE

## 2018-07-26 RX ORDER — AMIODARONE HYDROCHLORIDE 200 MG/1
100 TABLET ORAL
COMMUNITY
Start: 2018-04-16 | End: 2018-07-26 | Stop reason: ALTCHOICE

## 2018-07-26 RX ORDER — QUETIAPINE FUMARATE 25 MG/1
25 TABLET, FILM COATED ORAL
COMMUNITY
Start: 2018-04-16 | End: 2018-07-26 | Stop reason: ALTCHOICE

## 2018-07-26 RX ORDER — MIRTAZAPINE 15 MG/1
15 TABLET, FILM COATED ORAL
COMMUNITY
Start: 2018-04-16 | End: 2018-07-26 | Stop reason: ALTCHOICE

## 2018-07-26 RX ORDER — DIVALPROEX SODIUM 125 MG/1
125 CAPSULE, COATED PELLETS ORAL
COMMUNITY
Start: 2018-04-16 | End: 2018-07-26 | Stop reason: ALTCHOICE

## 2018-07-26 ASSESSMENT — ENCOUNTER SYMPTOMS
STRIDOR: 0
NAUSEA: 0
BLOOD IN STOOL: 0
APNEA: 0
CHOKING: 0
COUGH: 0
CONSTIPATION: 1
ANAL BLEEDING: 0
ABDOMINAL PAIN: 0
VOICE CHANGE: 0
TROUBLE SWALLOWING: 0
WHEEZING: 0
SHORTNESS OF BREATH: 0
VOMITING: 0
COLOR CHANGE: 0
DIARRHEA: 0
ABDOMINAL DISTENTION: 0
SORE THROAT: 0
CHEST TIGHTNESS: 0
RECTAL PAIN: 0
BACK PAIN: 0
EYE DISCHARGE: 0

## 2018-07-26 NOTE — PROGRESS NOTES
Constitutional: Negative for activity change, appetite change, chills, diaphoresis, fatigue, fever and unexpected weight change. HENT: Negative for drooling, hearing loss, mouth sores, sore throat, trouble swallowing and voice change. Eyes: Negative for discharge and visual disturbance. Respiratory: Negative for apnea, cough, choking, chest tightness, shortness of breath, wheezing and stridor. Cardiovascular: Negative for chest pain, palpitations and leg swelling. Gastrointestinal: Positive for constipation. Negative for abdominal distention, abdominal pain, anal bleeding, blood in stool, diarrhea, nausea, rectal pain and vomiting. Genitourinary: Negative for difficulty urinating, dysuria, enuresis, frequency and hematuria. Musculoskeletal: Positive for myalgias. Negative for arthralgias, back pain, gait problem and joint swelling. Skin: Negative for color change, pallor, rash and wound. Allergic/Immunologic: Negative for food allergies and immunocompromised state. Neurological: Negative for dizziness, tremors, seizures, syncope, facial asymmetry, speech difficulty, weakness, light-headedness, numbness and headaches. Hematological: Negative for adenopathy. Does not bruise/bleed easily. Psychiatric/Behavioral: Negative for agitation, behavioral problems, confusion, decreased concentration, dysphoric mood, hallucinations, self-injury, sleep disturbance and suicidal ideas. The patient is nervous/anxious. The patient is not hyperactive. Objective:   /64 (Site: Left Arm, Position: Sitting)   Pulse 84   Temp 99.4 °F (37.4 °C)   Resp 18   Wt 116 lb 6.4 oz (52.8 kg)   LMP  (LMP Unknown)   SpO2 97%   BMI 21.29 kg/m²     Physical Exam   Constitutional: She appears well-developed and well-nourished. No distress. HENT:   Head: Normocephalic and atraumatic.    Right Ear: External ear normal.   Left Ear: External ear normal.   Nose: Nose normal.   Mouth/Throat: Oropharynx is

## 2018-08-24 RX ORDER — ALPRAZOLAM 0.5 MG/1
0.5 TABLET ORAL 3 TIMES DAILY PRN
OUTPATIENT
Start: 2018-08-24

## 2018-09-06 ENCOUNTER — OFFICE VISIT (OUTPATIENT)
Dept: PALLATIVE CARE | Age: 78
End: 2018-09-06
Payer: MEDICARE

## 2018-09-06 ENCOUNTER — OFFICE VISIT (OUTPATIENT)
Dept: CARDIOLOGY CLINIC | Age: 78
End: 2018-09-06
Payer: MEDICARE

## 2018-09-06 VITALS
OXYGEN SATURATION: 98 % | TEMPERATURE: 98.7 F | HEART RATE: 88 BPM | DIASTOLIC BLOOD PRESSURE: 77 MMHG | SYSTOLIC BLOOD PRESSURE: 125 MMHG | RESPIRATION RATE: 18 BRPM

## 2018-09-06 VITALS
DIASTOLIC BLOOD PRESSURE: 66 MMHG | OXYGEN SATURATION: 93 % | HEIGHT: 62 IN | BODY MASS INDEX: 20.43 KG/M2 | RESPIRATION RATE: 16 BRPM | SYSTOLIC BLOOD PRESSURE: 114 MMHG | HEART RATE: 96 BPM | WEIGHT: 111 LBS

## 2018-09-06 DIAGNOSIS — R06.02 SOB (SHORTNESS OF BREATH): Primary | ICD-10-CM

## 2018-09-06 DIAGNOSIS — R07.9 CHEST PAIN, UNSPECIFIED TYPE: ICD-10-CM

## 2018-09-06 DIAGNOSIS — R00.0 TACHYCARDIA: ICD-10-CM

## 2018-09-06 DIAGNOSIS — I10 ESSENTIAL HYPERTENSION: ICD-10-CM

## 2018-09-06 DIAGNOSIS — R42 DIZZINESS: ICD-10-CM

## 2018-09-06 DIAGNOSIS — R52 PAIN: ICD-10-CM

## 2018-09-06 DIAGNOSIS — K59.01 SLOW TRANSIT CONSTIPATION: ICD-10-CM

## 2018-09-06 DIAGNOSIS — Z51.5 PALLIATIVE CARE ENCOUNTER: ICD-10-CM

## 2018-09-06 DIAGNOSIS — I47.1 PSVT (PAROXYSMAL SUPRAVENTRICULAR TACHYCARDIA) (HCC): ICD-10-CM

## 2018-09-06 DIAGNOSIS — F41.9 ANXIETY: ICD-10-CM

## 2018-09-06 DIAGNOSIS — I47.1 SVT (SUPRAVENTRICULAR TACHYCARDIA) (HCC): ICD-10-CM

## 2018-09-06 DIAGNOSIS — R10.84 GENERALIZED ABDOMINAL PAIN: ICD-10-CM

## 2018-09-06 DIAGNOSIS — G20 PARKINSON'S DISEASE (HCC): Primary | ICD-10-CM

## 2018-09-06 PROCEDURE — G8420 CALC BMI NORM PARAMETERS: HCPCS | Performed by: INTERNAL MEDICINE

## 2018-09-06 PROCEDURE — 1123F ACP DISCUSS/DSCN MKR DOCD: CPT | Performed by: INTERNAL MEDICINE

## 2018-09-06 PROCEDURE — 93000 ELECTROCARDIOGRAM COMPLETE: CPT | Performed by: INTERNAL MEDICINE

## 2018-09-06 PROCEDURE — 99215 OFFICE O/P EST HI 40 MIN: CPT | Performed by: INTERNAL MEDICINE

## 2018-09-06 PROCEDURE — 1036F TOBACCO NON-USER: CPT | Performed by: NURSE PRACTITIONER

## 2018-09-06 PROCEDURE — G8420 CALC BMI NORM PARAMETERS: HCPCS | Performed by: NURSE PRACTITIONER

## 2018-09-06 PROCEDURE — 99214 OFFICE O/P EST MOD 30 MIN: CPT | Performed by: NURSE PRACTITIONER

## 2018-09-06 PROCEDURE — 4040F PNEUMOC VAC/ADMIN/RCVD: CPT | Performed by: NURSE PRACTITIONER

## 2018-09-06 PROCEDURE — G8400 PT W/DXA NO RESULTS DOC: HCPCS | Performed by: INTERNAL MEDICINE

## 2018-09-06 PROCEDURE — G8427 DOCREV CUR MEDS BY ELIG CLIN: HCPCS | Performed by: INTERNAL MEDICINE

## 2018-09-06 PROCEDURE — G8427 DOCREV CUR MEDS BY ELIG CLIN: HCPCS | Performed by: NURSE PRACTITIONER

## 2018-09-06 PROCEDURE — 1090F PRES/ABSN URINE INCON ASSESS: CPT | Performed by: NURSE PRACTITIONER

## 2018-09-06 PROCEDURE — 1101F PT FALLS ASSESS-DOCD LE1/YR: CPT | Performed by: INTERNAL MEDICINE

## 2018-09-06 PROCEDURE — 1090F PRES/ABSN URINE INCON ASSESS: CPT | Performed by: INTERNAL MEDICINE

## 2018-09-06 PROCEDURE — 4040F PNEUMOC VAC/ADMIN/RCVD: CPT | Performed by: INTERNAL MEDICINE

## 2018-09-06 PROCEDURE — 1101F PT FALLS ASSESS-DOCD LE1/YR: CPT | Performed by: NURSE PRACTITIONER

## 2018-09-06 PROCEDURE — G8400 PT W/DXA NO RESULTS DOC: HCPCS | Performed by: NURSE PRACTITIONER

## 2018-09-06 PROCEDURE — 1036F TOBACCO NON-USER: CPT | Performed by: INTERNAL MEDICINE

## 2018-09-06 PROCEDURE — 1123F ACP DISCUSS/DSCN MKR DOCD: CPT | Performed by: NURSE PRACTITIONER

## 2018-09-06 RX ORDER — LANOLIN ALCOHOL/MO/W.PET/CERES
6 CREAM (GRAM) TOPICAL
COMMUNITY
Start: 2018-07-31

## 2018-09-06 RX ORDER — LEVOTHYROXINE SODIUM 0.05 MG/1
50 TABLET ORAL
COMMUNITY
Start: 2018-08-20

## 2018-09-06 RX ORDER — DIAZEPAM 5 MG/1
5 TABLET ORAL EVERY 8 HOURS PRN
Qty: 90 TABLET | Refills: 0 | Status: SHIPPED | OUTPATIENT
Start: 2018-09-06 | End: 2018-10-04 | Stop reason: ALTCHOICE

## 2018-09-06 ASSESSMENT — ENCOUNTER SYMPTOMS
STRIDOR: 0
WHEEZING: 0
ANAL BLEEDING: 0
SHORTNESS OF BREATH: 0
CHOKING: 0
NAUSEA: 0
VOMITING: 0
NAUSEA: 0
APNEA: 0
VOICE CHANGE: 0
TROUBLE SWALLOWING: 0
BLOOD IN STOOL: 0
CHEST TIGHTNESS: 0
GASTROINTESTINAL NEGATIVE: 1
CHEST TIGHTNESS: 0
DIARRHEA: 0
SORE THROAT: 0
STRIDOR: 0
EYES NEGATIVE: 1
COUGH: 0
BLOOD IN STOOL: 0
COUGH: 0
BACK PAIN: 0
RECTAL PAIN: 0
EYE DISCHARGE: 0
COLOR CHANGE: 0
SHORTNESS OF BREATH: 1
WHEEZING: 0
ABDOMINAL PAIN: 1
CONSTIPATION: 1
ABDOMINAL DISTENTION: 0

## 2018-09-06 NOTE — PROGRESS NOTES
by mouth 2 times daily      HYDROcodone-acetaminophen (NORCO) 5-325 MG per tablet Take 1 tablet by mouth every 8 hours as needed for Pain. Brandi Handy mirtazapine (REMERON) 15 MG tablet Take 15 mg by mouth nightly      alendronate (FOSAMAX) 70 MG tablet   0    VESICARE 10 MG tablet   0    QUEtiapine (SEROQUEL) 25 MG tablet Take 0.5 tablets by mouth 2 times daily as needed for Agitation 60 tablet 3    lactulose (CHRONULAC) 10 GM/15ML solution Take 15 mLs by mouth every evening 1 Bottle 0    albuterol sulfate HFA (VENTOLIN HFA) 108 (90 Base) MCG/ACT inhaler Inhale 2 puffs into the lungs every 6 hours as needed for Wheezing 1 Inhaler 3    aspirin 81 MG tablet Take 81 mg by mouth daily      busPIRone (BUSPAR) 10 MG tablet Take 10 mg by mouth nightly      calcium carbonate (OSCAL) 500 MG TABS tablet Take 500 mg by mouth 2 times daily      carbidopa-levodopa (SINEMET)  MG per tablet Take 1 tablet by mouth 2 times daily       rotigotine (NEUPRO) 2 MG/24HR Place 1 patch onto the skin daily      pramipexole (MIRAPEX) 0.5 MG tablet Take 0.5 mg by mouth 3 times daily      Cholecalciferol (VITAMIN D3) 2000 units TABS Take by mouth every morning      levothyroxine (SYNTHROID) 50 MCG tablet Take 50 mcg by mouth      diazepam (VALIUM) 5 MG tablet Take 1 tablet by mouth every 8 hours as needed for Anxiety (pain) for up to 30 days. . 90 tablet 0    sennosides-docusate sodium (SENOKOT-S) 8.6-50 MG tablet Take 2 tablets by mouth 2 times daily 120 tablet 5    amiodarone (CORDARONE) 200 MG tablet Take 1 tablet by mouth daily (Patient taking differently: Take 100 mg by mouth daily ) 30 tablet 5     No current facility-administered medications for this visit. Review of Systems:   Review of Systems   Constitutional: Positive for fatigue. Negative for diaphoresis. HENT: Negative. Eyes: Negative. Respiratory: Positive for shortness of breath. Negative for cough, chest tightness, wheezing and stridor. Cardiovascular: Positive for chest pain. Negative for palpitations and leg swelling. Gastrointestinal: Negative. Negative for blood in stool and nausea. Genitourinary: Negative. Musculoskeletal: Negative. Skin: Negative. Neurological: Positive for dizziness and weakness. Negative for syncope and light-headedness. Hematological: Negative. Psychiatric/Behavioral: Negative. Physical Examination:    /66 (Site: Left Arm, Position: Sitting, Cuff Size: Large Adult)   Pulse 96   Resp 16   Ht 5' 2\" (1.575 m)   Wt 111 lb (50.3 kg)   LMP  (LMP Unknown)   SpO2 93%   BMI 20.30 kg/m²    Physical Exam   Constitutional: No distress. She appears chronically ill. HENT:   Normal cephalic and Atraumatic   Eyes: Pupils are equal, round, and reactive to light. Neck: Normal range of motion and thyroid normal. Neck supple. No JVD present. No neck adenopathy. No thyromegaly present. Cardiovascular: Normal rate, regular rhythm, intact distal pulses and normal pulses. Murmur heard. Pulmonary/Chest: Effort normal and breath sounds normal. She has no wheezes. She has no rales. She exhibits no tenderness. Abdominal: Soft. Bowel sounds are normal. There is no tenderness. Musculoskeletal: Normal range of motion. She exhibits no edema or tenderness. Neurological: She is alert and oriented to person, place, and time. Skin: Skin is warm. No cyanosis. Nails show no clubbing.        LABS:  CBC:   Lab Results   Component Value Date    WBC 3.4 06/20/2018    RBC 3.67 06/20/2018    HGB 11.5 06/20/2018    HCT 34.5 06/20/2018    MCV 93.9 06/20/2018    MCH 31.2 06/20/2018    MCHC 33.3 06/20/2018    RDW 14.8 06/20/2018     06/20/2018    MPV 9.4 09/22/2015     Lipids:  Lab Results   Component Value Date    CHOL 196 01/03/2018    CHOL 199 09/21/2015    CHOL 188 01/23/2014     Lab Results   Component Value Date    TRIG 150 01/03/2018    TRIG 113 09/21/2015    TRIG 155 01/23/2014     Lab Results

## 2018-09-06 NOTE — PROGRESS NOTES
cyracomSubjective:      Patient Id: Luann Atkinson is a 70 y.o. female who presents today with No chief complaint on file. [facility] [id number] interpretor used for visit    Seen at [facility] for symptom management follow up for symptom management follow up. Yisel Petersen complains of epigastric pain and constipation. Last BM today, it was hard and she must strain. This has been the same for some time. She has tried multiple medications, currently is taking Carafate, omeprazole, lactulose, senna, and colace without much relief. Negative significant anxiety, agitation, or depression, negative significant sleep disturbance, though at time abdominal pain keeps her up. Negative edema, SOB, chest pain, or diaphoresis. Appetite good, weight stable. Past Medical History:   Diagnosis Date    Depression     Fatigue     Fibromyalgia     Hyperlipidemia     Hypertension     Hypothyroid     Levodopa-induced dyskinesia     Lumbago     Muscular wasting and disuse atrophy     Myalgia     Osteoarthritis     Paralysis agitans [address] [address]     Parkinson's disease ([address]     Sepsis ([facility] [address] 11/24/2017    Spinal stenosis     Thyroid disease     Urinary frequency      No past surgical history on file. Social History     Social History    Marital status:      Spouse name: N/A    Number of children: N/A    Years of education: N/A     Occupational History    Not on file.      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 Eyes: Negative for discharge and visual disturbance. Respiratory: Negative for apnea, cough, choking, chest tightness, shortness of breath, wheezing and stridor. Cardiovascular: Negative for chest pain, palpitations and leg swelling. Gastrointestinal: Positive for abdominal pain and constipation. Negative for abdominal distention, anal bleeding, blood in stool, diarrhea, nausea, rectal pain and vomiting. Genitourinary: Negative for difficulty urinating, dysuria, enuresis, frequency and hematuria. Musculoskeletal: Negative for arthralgias, back pain, gait problem, joint swelling and myalgias. Skin: Negative for color change, pallor, rash and wound. Allergic/Immunologic: Negative for food allergies and immunocompromised state. Neurological: Negative for dizziness, tremors, seizures, syncope, facial asymmetry, speech difficulty, weakness, light-headedness, numbness and headaches. Hematological: Negative for adenopathy. Does not bruise/bleed easily. Psychiatric/Behavioral: Negative for agitation, behavioral problems, confusion, decreased concentration, dysphoric mood, hallucinations, self-injury, sleep disturbance and suicidal ideas. The patient is not nervous/anxious and is not hyperactive. Objective:   /77 (Site: Left Arm, Position: Sitting)   Pulse 88   Temp 98.7 °F (37.1 °C)   Resp 18   LMP  (LMP Unknown)   SpO2 98%     Physical Exam   Constitutional: She is oriented to person, place, and time. She appears well-developed and well-nourished. No distress. HENT:   Head: Normocephalic and atraumatic. Right Ear: External ear normal.   Left Ear: External ear normal.   Nose: Nose normal.   Mouth/Throat: Oropharynx is clear and moist. No oropharyngeal exudate. Eyes: Pupils are equal, round, and reactive to light. Conjunctivae and EOM are normal. Right eye exhibits no discharge. Left eye exhibits no discharge. No scleral icterus. Neck: Normal range of motion. Neck supple. No JVD present. No tracheal deviation present. No thyromegaly present. Cardiovascular: Normal rate, regular rhythm and normal heart sounds. Pulmonary/Chest: Effort normal. No accessory muscle usage or stridor. No respiratory distress. She has decreased breath sounds. She has no wheezes. She has no rales. She exhibits no tenderness. Abdominal: Soft. Normal appearance and bowel sounds are normal. She exhibits no distension and no mass. There is no tenderness. There is no rebound and no guarding. Musculoskeletal: Normal range of motion. She exhibits no edema, tenderness or deformity. Lymphadenopathy:     She has no cervical adenopathy. Neurological: She is alert and oriented to person, place, and time. Skin: Skin is warm and dry. No rash noted. She is not diaphoretic. No erythema. Psychiatric: She has a normal mood and affect. Her behavior is normal. Thought content normal.         Assessment:       Diagnosis Orders   1. Parkinson's disease ([address]  diazepam (VALIUM) 5 MG tablet   2. Pain  diazepam (VALIUM) 5 MG tablet   3. Anxiety  diazepam (VALIUM) 5 MG tablet   4. Generalized abdominal pain     5. Slow transit constipation     6. Palliative care encounter             Plan:      Other counseling  - discussed polypharmacy, [place] Parmar is taking may medications that can cause constipation and do the same thing, she and her family agree to go through the med list with me and reduce medication buren in an attempt to simplify and improve constipation.     Multi Joint pain r/t parkinson's and reduced mobility  - Continue Gabapentin  - Continue Norco prn  - Stop taking both Alprazolam and Ativan as she is at risk for respiratory depression and both can increase constipation  - Stop Zanaflex  - Start diazepam 5 mg po tid as it will offer relief of pain r/t muscle spasms from immobility and anxiety    Epigastric Pain  - Stop Carafate as it has not helped and may be adding to constipation  - Continue Omeprazole    Slow Transit Constipation  - Continue Lactulose at bedtime  - Continue Senna/colace tabs  - Increase fluid and fiber in diet  - Exercise as tolerated  - Hold all for loose stool  - Call if no BM in three days      Follow up in one month    Keturah Dias, APRN - CNP

## 2018-09-14 ENCOUNTER — HOSPITAL ENCOUNTER (OUTPATIENT)
Dept: ULTRASOUND IMAGING | Age: 78
Discharge: HOME | End: 2018-09-16
Payer: MEDICARE

## 2018-09-14 ENCOUNTER — HOSPITAL ENCOUNTER (OUTPATIENT)
Dept: NUCLEAR MEDICINE | Age: 78
Discharge: HOME | End: 2018-09-16
Payer: MEDICARE

## 2018-09-14 DIAGNOSIS — R07.9 CHEST PAIN, UNSPECIFIED TYPE: ICD-10-CM

## 2018-09-14 DIAGNOSIS — R42 DIZZINESS: ICD-10-CM

## 2018-09-14 PROCEDURE — 78452 HT MUSCLE IMAGE SPECT MULT: CPT

## 2018-09-14 PROCEDURE — 93017 CV STRESS TEST TRACING ONLY: CPT

## 2018-09-14 PROCEDURE — 93880 EXTRACRANIAL BILAT STUDY: CPT

## 2018-09-14 PROCEDURE — 2580000003 HC RX 258: Performed by: INTERNAL MEDICINE

## 2018-09-14 PROCEDURE — A9502 TC99M TETROFOSMIN: HCPCS | Performed by: INTERNAL MEDICINE

## 2018-09-14 PROCEDURE — 3430000000 HC RX DIAGNOSTIC RADIOPHARMACEUTICAL: Performed by: INTERNAL MEDICINE

## 2018-09-14 PROCEDURE — 93880 EXTRACRANIAL BILAT STUDY: CPT | Performed by: INTERNAL MEDICINE

## 2018-09-14 PROCEDURE — 6360000002 HC RX W HCPCS: Performed by: INTERNAL MEDICINE

## 2018-09-14 RX ORDER — SODIUM CHLORIDE 0.9 % (FLUSH) 0.9 %
10 SYRINGE (ML) INJECTION PRN
Status: COMPLETED | OUTPATIENT
Start: 2018-09-14 | End: 2018-09-14

## 2018-09-14 RX ADMIN — TETROFOSMIN 10.3 MILLICURIE: 0.23 INJECTION, POWDER, LYOPHILIZED, FOR SOLUTION INTRAVENOUS at 11:10

## 2018-09-14 RX ADMIN — REGADENOSON 0.4 MG: 0.08 INJECTION, SOLUTION INTRAVENOUS at 12:33

## 2018-09-14 RX ADMIN — TETROFOSMIN 31.3 MILLICURIE: 0.23 INJECTION, POWDER, LYOPHILIZED, FOR SOLUTION INTRAVENOUS at 12:33

## 2018-09-14 RX ADMIN — Medication 10 ML: at 12:33

## 2018-09-14 RX ADMIN — Medication 10 ML: at 11:10

## 2018-09-14 RX ADMIN — Medication 10 ML: at 12:34

## 2018-10-04 ENCOUNTER — OFFICE VISIT (OUTPATIENT)
Dept: PALLATIVE CARE | Age: 78
End: 2018-10-04
Payer: MEDICARE

## 2018-10-04 VITALS
HEART RATE: 90 BPM | DIASTOLIC BLOOD PRESSURE: 77 MMHG | RESPIRATION RATE: 18 BRPM | SYSTOLIC BLOOD PRESSURE: 135 MMHG | OXYGEN SATURATION: 98 % | TEMPERATURE: 98.7 F

## 2018-10-04 DIAGNOSIS — G47.00 INSOMNIA, UNSPECIFIED TYPE: ICD-10-CM

## 2018-10-04 DIAGNOSIS — Z51.5 PALLIATIVE CARE PATIENT: ICD-10-CM

## 2018-10-04 DIAGNOSIS — M25.50 ARTHRALGIA, UNSPECIFIED JOINT: ICD-10-CM

## 2018-10-04 DIAGNOSIS — G20 PARKINSON DISEASE (HCC): ICD-10-CM

## 2018-10-04 DIAGNOSIS — F41.9 ANXIETY: Primary | ICD-10-CM

## 2018-10-04 PROCEDURE — G8400 PT W/DXA NO RESULTS DOC: HCPCS | Performed by: NURSE PRACTITIONER

## 2018-10-04 PROCEDURE — 1036F TOBACCO NON-USER: CPT | Performed by: NURSE PRACTITIONER

## 2018-10-04 PROCEDURE — 1101F PT FALLS ASSESS-DOCD LE1/YR: CPT | Performed by: NURSE PRACTITIONER

## 2018-10-04 PROCEDURE — G8420 CALC BMI NORM PARAMETERS: HCPCS | Performed by: NURSE PRACTITIONER

## 2018-10-04 PROCEDURE — G8428 CUR MEDS NOT DOCUMENT: HCPCS | Performed by: NURSE PRACTITIONER

## 2018-10-04 PROCEDURE — 99215 OFFICE O/P EST HI 40 MIN: CPT | Performed by: NURSE PRACTITIONER

## 2018-10-04 PROCEDURE — G8484 FLU IMMUNIZE NO ADMIN: HCPCS | Performed by: NURSE PRACTITIONER

## 2018-10-04 PROCEDURE — 1123F ACP DISCUSS/DSCN MKR DOCD: CPT | Performed by: NURSE PRACTITIONER

## 2018-10-04 PROCEDURE — 1090F PRES/ABSN URINE INCON ASSESS: CPT | Performed by: NURSE PRACTITIONER

## 2018-10-04 PROCEDURE — 4040F PNEUMOC VAC/ADMIN/RCVD: CPT | Performed by: NURSE PRACTITIONER

## 2018-10-04 RX ORDER — QUETIAPINE FUMARATE 25 MG/1
TABLET, FILM COATED ORAL
COMMUNITY
Start: 2018-09-28 | End: 2018-10-04 | Stop reason: SDUPTHER

## 2018-10-04 RX ORDER — GABAPENTIN 300 MG/1
CAPSULE ORAL
COMMUNITY
Start: 2018-09-28 | End: 2018-10-04 | Stop reason: SDUPTHER

## 2018-10-04 RX ORDER — DIAZEPAM 10 MG/1
10 TABLET ORAL EVERY 8 HOURS
Qty: 30 TABLET | Refills: 0 | Status: SHIPPED | OUTPATIENT
Start: 2018-10-04 | End: 2018-10-14

## 2018-10-04 ASSESSMENT — ENCOUNTER SYMPTOMS
ABDOMINAL DISTENTION: 0
ABDOMINAL PAIN: 0
VOICE CHANGE: 0
CONSTIPATION: 0
CHOKING: 0
SORE THROAT: 0
SHORTNESS OF BREATH: 0
WHEEZING: 0
BACK PAIN: 0
APNEA: 0
NAUSEA: 0
EYE DISCHARGE: 0
CHEST TIGHTNESS: 0
BLOOD IN STOOL: 0
TROUBLE SWALLOWING: 0
STRIDOR: 0
COLOR CHANGE: 0
RECTAL PAIN: 0
ANAL BLEEDING: 0
COUGH: 0
VOMITING: 0
DIARRHEA: 0

## 2018-10-08 ENCOUNTER — OFFICE VISIT (OUTPATIENT)
Dept: CARDIOLOGY CLINIC | Age: 78
End: 2018-10-08
Payer: MEDICARE

## 2018-10-08 VITALS — OXYGEN SATURATION: 97 % | RESPIRATION RATE: 16 BRPM | HEIGHT: 62 IN | BODY MASS INDEX: 20.3 KG/M2 | HEART RATE: 76 BPM

## 2018-10-08 DIAGNOSIS — I47.29 NSVT (NONSUSTAINED VENTRICULAR TACHYCARDIA): ICD-10-CM

## 2018-10-08 DIAGNOSIS — I47.1 SVT (SUPRAVENTRICULAR TACHYCARDIA) (HCC): ICD-10-CM

## 2018-10-08 DIAGNOSIS — I10 ESSENTIAL HYPERTENSION: ICD-10-CM

## 2018-10-08 DIAGNOSIS — R00.0 TACHYCARDIA: ICD-10-CM

## 2018-10-08 DIAGNOSIS — R06.02 SOB (SHORTNESS OF BREATH): Primary | ICD-10-CM

## 2018-10-08 PROCEDURE — 4040F PNEUMOC VAC/ADMIN/RCVD: CPT | Performed by: INTERNAL MEDICINE

## 2018-10-08 PROCEDURE — 1090F PRES/ABSN URINE INCON ASSESS: CPT | Performed by: INTERNAL MEDICINE

## 2018-10-08 PROCEDURE — 1123F ACP DISCUSS/DSCN MKR DOCD: CPT | Performed by: INTERNAL MEDICINE

## 2018-10-08 PROCEDURE — G8427 DOCREV CUR MEDS BY ELIG CLIN: HCPCS | Performed by: INTERNAL MEDICINE

## 2018-10-08 PROCEDURE — 99214 OFFICE O/P EST MOD 30 MIN: CPT | Performed by: INTERNAL MEDICINE

## 2018-10-08 PROCEDURE — G8400 PT W/DXA NO RESULTS DOC: HCPCS | Performed by: INTERNAL MEDICINE

## 2018-10-08 PROCEDURE — G8420 CALC BMI NORM PARAMETERS: HCPCS | Performed by: INTERNAL MEDICINE

## 2018-10-08 PROCEDURE — G8484 FLU IMMUNIZE NO ADMIN: HCPCS | Performed by: INTERNAL MEDICINE

## 2018-10-08 PROCEDURE — 1036F TOBACCO NON-USER: CPT | Performed by: INTERNAL MEDICINE

## 2018-10-08 PROCEDURE — 1101F PT FALLS ASSESS-DOCD LE1/YR: CPT | Performed by: INTERNAL MEDICINE

## 2018-10-08 ASSESSMENT — ENCOUNTER SYMPTOMS
NAUSEA: 0
BLOOD IN STOOL: 0
RESPIRATORY NEGATIVE: 1
COUGH: 0
EYES NEGATIVE: 1
GASTROINTESTINAL NEGATIVE: 1
BACK PAIN: 1
SHORTNESS OF BREATH: 0
WHEEZING: 0
CHEST TIGHTNESS: 0
STRIDOR: 0

## 2018-10-08 NOTE — PROGRESS NOTES
01/23/2014     Lab Results   Component Value Date    LDLCALC 108 01/03/2018    LDLCALC 114 09/21/2015    LDLCALC 94 01/23/2014     No results found for: LABVLDL, VLDL  No results found for: CHOLHDLRATIO  CMP:    Lab Results   Component Value Date     06/20/2018    K 3.8 06/20/2018    K 4.4 02/15/2018     06/20/2018    CO2 26 06/20/2018    BUN 16 06/20/2018    CREATININE 0.78 06/20/2018    GFRAA >60.0 06/20/2018    LABGLOM >60.0 06/20/2018    GLUCOSE 94 06/20/2018    PROT 6.2 02/20/2018    LABALBU 3.6 02/20/2018    CALCIUM 9.4 06/20/2018    BILITOT 0.5 02/20/2018    ALKPHOS 340 02/20/2018    AST 19 02/20/2018    ALT 20 02/20/2018     BMP:    Lab Results   Component Value Date     06/20/2018    K 3.8 06/20/2018    K 4.4 02/15/2018     06/20/2018    CO2 26 06/20/2018    BUN 16 06/20/2018    LABALBU 3.6 02/20/2018    CREATININE 0.78 06/20/2018    CALCIUM 9.4 06/20/2018    GFRAA >60.0 06/20/2018    LABGLOM >60.0 06/20/2018    GLUCOSE 94 06/20/2018     Magnesium:    Lab Results   Component Value Date    MG 2.1 06/20/2018     TSH:  Lab Results   Component Value Date    TSH 27.530 (H) 06/20/2018       Patient Active Problem List   Diagnosis    Hyperglycemia    Urinary retention    Altered mental status    PSVT (paroxysmal supraventricular tachycardia) (HCC)    Hypothyroidism    Parkinson disease (HCC)    Fibromyalgia    Essential hypertension    Tachycardia    Acute cystitis without hematuria    SOB (shortness of breath)    Chronic pain    Age-related physical debility    Altered mental state    SVT (supraventricular tachycardia) (HCC)    Hypothyroidism due to medication    Delirium    Abnormal LFTs    NSVT (nonsustained ventricular tachycardia) (HCC)       There are no discontinued medications. Modified Medications    No medications on file       No orders of the defined types were placed in this encounter. Assessment/Plan:    1.  Essential hypertension  STABLE ON

## 2018-10-18 ENCOUNTER — APPOINTMENT (OUTPATIENT)
Dept: CT IMAGING | Age: 78
End: 2018-10-18
Payer: MEDICARE

## 2018-10-18 ENCOUNTER — HOSPITAL ENCOUNTER (EMERGENCY)
Age: 78
Discharge: HOME HEALTH | End: 2018-10-18
Attending: EMERGENCY MEDICINE
Payer: MEDICARE

## 2018-10-18 ENCOUNTER — APPOINTMENT (OUTPATIENT)
Dept: GENERAL RADIOLOGY | Age: 78
End: 2018-10-18
Payer: MEDICARE

## 2018-10-18 VITALS
DIASTOLIC BLOOD PRESSURE: 53 MMHG | RESPIRATION RATE: 20 BRPM | HEART RATE: 91 BPM | OXYGEN SATURATION: 98 % | SYSTOLIC BLOOD PRESSURE: 99 MMHG | TEMPERATURE: 97.7 F

## 2018-10-18 DIAGNOSIS — R63.0 LOSS OF APPETITE: ICD-10-CM

## 2018-10-18 DIAGNOSIS — Z51.5 END OF LIFE CARE: Primary | ICD-10-CM

## 2018-10-18 DIAGNOSIS — G20 PARKINSON'S DISEASE (HCC): ICD-10-CM

## 2018-10-18 LAB
ALBUMIN SERPL-MCNC: 4.2 G/DL (ref 3.9–4.9)
ALP BLD-CCNC: 81 U/L (ref 40–130)
ALT SERPL-CCNC: 36 U/L (ref 0–33)
ANION GAP SERPL CALCULATED.3IONS-SCNC: 14 MEQ/L (ref 7–13)
APTT: 27.5 SEC (ref 21.6–35.4)
AST SERPL-CCNC: 47 U/L (ref 0–35)
BASOPHILS ABSOLUTE: 0 K/UL (ref 0–0.2)
BASOPHILS RELATIVE PERCENT: 0.3 %
BILIRUB SERPL-MCNC: 0.5 MG/DL (ref 0–1.2)
BILIRUBIN URINE: ABNORMAL
BLOOD, URINE: NEGATIVE
BUN BLDV-MCNC: 25 MG/DL (ref 8–23)
CALCIUM SERPL-MCNC: 10.2 MG/DL (ref 8.6–10.2)
CHLORIDE BLD-SCNC: 105 MEQ/L (ref 98–107)
CLARITY: CLEAR
CO2: 28 MEQ/L (ref 22–29)
COLOR: ABNORMAL
CREAT SERPL-MCNC: 1.2 MG/DL (ref 0.5–0.9)
EKG ATRIAL RATE: 87 BPM
EKG P AXIS: 38 DEGREES
EKG P-R INTERVAL: 150 MS
EKG Q-T INTERVAL: 356 MS
EKG QRS DURATION: 78 MS
EKG QTC CALCULATION (BAZETT): 428 MS
EKG R AXIS: -29 DEGREES
EKG T AXIS: 66 DEGREES
EKG VENTRICULAR RATE: 87 BPM
EOSINOPHILS ABSOLUTE: 0 K/UL (ref 0–0.7)
EOSINOPHILS RELATIVE PERCENT: 0.1 %
EPITHELIAL CELLS, UA: NORMAL /HPF
GFR AFRICAN AMERICAN: 52.7
GFR NON-AFRICAN AMERICAN: 43.6
GLOBULIN: 3.6 G/DL (ref 2.3–3.5)
GLUCOSE BLD-MCNC: 113 MG/DL (ref 74–109)
GLUCOSE URINE: NEGATIVE MG/DL
HCT VFR BLD CALC: 42.1 % (ref 37–47)
HEMOGLOBIN: 13.8 G/DL (ref 12–16)
INR BLD: 1.1
KETONES, URINE: NEGATIVE MG/DL
LACTIC ACID: 2.3 MMOL/L (ref 0.5–2.2)
LEUKOCYTE ESTERASE, URINE: ABNORMAL
LYMPHOCYTES ABSOLUTE: 0.8 K/UL (ref 1–4.8)
LYMPHOCYTES RELATIVE PERCENT: 6.5 %
MCH RBC QN AUTO: 31.8 PG (ref 27–31.3)
MCHC RBC AUTO-ENTMCNC: 32.9 % (ref 33–37)
MCV RBC AUTO: 96.8 FL (ref 82–100)
MONOCYTES ABSOLUTE: 0.8 K/UL (ref 0.2–0.8)
MONOCYTES RELATIVE PERCENT: 6.6 %
NEUTROPHILS ABSOLUTE: 10.9 K/UL (ref 1.4–6.5)
NEUTROPHILS RELATIVE PERCENT: 86.5 %
NITRITE, URINE: NEGATIVE
PDW BLD-RTO: 14.3 % (ref 11.5–14.5)
PH UA: 5 (ref 5–9)
PLATELET # BLD: 119 K/UL (ref 130–400)
POTASSIUM SERPL-SCNC: 4.6 MEQ/L (ref 3.5–5.1)
PROTEIN UA: NEGATIVE MG/DL
PROTHROMBIN TIME: 11.4 SEC (ref 9.6–12.3)
RBC # BLD: 4.35 M/UL (ref 4.2–5.4)
RBC UA: NORMAL /HPF (ref 0–2)
SODIUM BLD-SCNC: 147 MEQ/L (ref 132–144)
SPECIFIC GRAVITY UA: 1.03 (ref 1–1.03)
TOTAL CK: 65 U/L (ref 0–170)
TOTAL PROTEIN: 7.8 G/DL (ref 6.4–8.1)
TROPONIN: <0.01 NG/ML (ref 0–0.01)
URINE REFLEX TO CULTURE: YES
UROBILINOGEN, URINE: 1 E.U./DL
WBC # BLD: 12.5 K/UL (ref 4.8–10.8)
WBC UA: NORMAL /HPF (ref 0–5)

## 2018-10-18 PROCEDURE — 2580000003 HC RX 258: Performed by: EMERGENCY MEDICINE

## 2018-10-18 PROCEDURE — 80053 COMPREHEN METABOLIC PANEL: CPT

## 2018-10-18 PROCEDURE — 83605 ASSAY OF LACTIC ACID: CPT

## 2018-10-18 PROCEDURE — 93005 ELECTROCARDIOGRAM TRACING: CPT

## 2018-10-18 PROCEDURE — 99285 EMERGENCY DEPT VISIT HI MDM: CPT

## 2018-10-18 PROCEDURE — 84484 ASSAY OF TROPONIN QUANT: CPT

## 2018-10-18 PROCEDURE — 70450 CT HEAD/BRAIN W/O DYE: CPT

## 2018-10-18 PROCEDURE — 87086 URINE CULTURE/COLONY COUNT: CPT

## 2018-10-18 PROCEDURE — 85025 COMPLETE CBC W/AUTO DIFF WBC: CPT

## 2018-10-18 PROCEDURE — 87040 BLOOD CULTURE FOR BACTERIA: CPT

## 2018-10-18 PROCEDURE — 81001 URINALYSIS AUTO W/SCOPE: CPT

## 2018-10-18 PROCEDURE — 82550 ASSAY OF CK (CPK): CPT

## 2018-10-18 PROCEDURE — 85730 THROMBOPLASTIN TIME PARTIAL: CPT

## 2018-10-18 PROCEDURE — 71045 X-RAY EXAM CHEST 1 VIEW: CPT

## 2018-10-18 PROCEDURE — 36415 COLL VENOUS BLD VENIPUNCTURE: CPT

## 2018-10-18 PROCEDURE — 85610 PROTHROMBIN TIME: CPT

## 2018-10-18 RX ORDER — 0.9 % SODIUM CHLORIDE 0.9 %
1000 INTRAVENOUS SOLUTION INTRAVENOUS ONCE
Status: COMPLETED | OUTPATIENT
Start: 2018-10-18 | End: 2018-10-19

## 2018-10-18 RX ADMIN — SODIUM CHLORIDE 1000 ML: 9 INJECTION, SOLUTION INTRAVENOUS at 14:14

## 2018-10-19 LAB — URINE CULTURE, ROUTINE: NORMAL

## 2018-10-19 PROCEDURE — 93010 ELECTROCARDIOGRAM REPORT: CPT | Performed by: INTERNAL MEDICINE

## 2018-10-23 LAB
BLOOD CULTURE, ROUTINE: NORMAL
CULTURE, BLOOD 2: NORMAL

## 2022-03-30 NOTE — PROGRESS NOTES
Problem: SLP  Goal: SLP Goal  Description: Speech-language Pathology Goals  Goals to be met by 4/6  1. Pt will participate in ongoing assessment of swallow to determine safest and least restrictive diet.  2. Pt will participate in SLC eval to further determine POC.    Outcome: Ongoing, Progressing     Pt seen for BSE. SLP recommending Dysphagia soft (Level 6)/thin liquid diet. Continue SLP POC.    Kasandra Bauman, S-SLP  Speech-Language Pathology     day and dc

## 2024-09-28 NOTE — PROGRESS NOTES
Neurology Follow up    SUBJECTIVE: very few question answered, but some english noted  Follows all commands    PHYSICAL EXAM:    /71   Pulse 83   Temp 98.6 °F (37 °C) (Oral)   Resp 21   Wt 139 lb 11.2 oz (63.4 kg)   SpO2 100%   BMI 25.55 kg/m²   General Appearance:      Mental Status Exam:             Level of Alertness:   awake            Orientation:   person,                Funduscopic Exam:     Cranial Nerves, stare, decreased blink response  decresed saccades          Cranial nerve II           Visual acuity:  normal           Visual fields:  normal      Cranial nerve III           Pupils:  equal, round, reactive to light      Cranial nerves III, IV, VI           Extraocular Movements: intact      Cranial nerve V           Facial sensation:  intact      Cranial nerve VII           Facial strength: intact      Cranial nerve VIII           Hearing:  intact      Cranial nerve IX           Palate:  intact      Cranial nerve XI         Shoulder shrug:  intact      Cranial nerve XII          Tongue movement:  normal    Motor:    Drift:  absent  Motor exam is symmetrical 4 out of 5 all extremities bilaterally  Tone:  normal  Abnormal Movements:  absent            Sensory:not reliable        Pinprick             Right Upper Extremity:  normal             Left Upper Extremity:  normal             Right Lower Extremity:  normal             Left Lower Extremity:  normal           Vibration                         Touch            Proprioception                 Coordination: nor relaible, but stifff, cogwheeling          Finger/Nose   Right:  normal              Left:  normal          Heel-Knee-Shin                Right:  normal              Left:  normal          Rapid Alternating Movements              Right:  normal              Left:  normal          Gait:                       Casual:  defered                       Romberg:              Reflexes:             Deep Tendon Reflexes:             Reflexes are 2 urinary bladder most likely secondary to instrumentation/Trevizo catheter placement. All CT scans at this facility use dose modulation, iterative reconstruction, and/or weight based dosing when appropriate to reduce radiation dose to as low as reasonably achievable. Ct Head Without Contrast    Result Date: 10/26/2017  HEAD CT WITHOUT CONTRAST: 10/26/2017 CLINICAL HISTORY: Altered mental status. COMPARISON: 9/21/2015. TECHNIQUE: Spiral unenhanced images were obtained of the head, with routine reconstructions performed. All CT scans at this facility use dose modulation, iterative reconstruction, and/or weight based dosing when appropriate to reduce radiation dose to as low as reasonably achievable. FINDINGS: There is no intracranial hemorrhage, mass effect, midline shift, extra-axial collection,  evidence of hydrocephalus, recent ischemic infarct, or skull fracture identified. Mild atrophic changes appear similarly prior study The mastoid air cells and visualized paranasal sinuses are clear. NO ACUTE INTRACRANIAL PROCESS, OR SIGNIFICANT CHANGE FROM 9/21/2015 IDENTIFIED. Xr Chest Portable    Result Date: 10/26/2017  EXAMINATION: XR CHEST PORTABLE 1 VIEW: CLINICAL HISTORY: PATIENT UNRESPONSIVE. COMPARISONS: 10/19/2017 FINDINGS: Radiograph was obtained in shallow inspiration. There is moderate elevation of the right hemidiaphragm. There is mild right lower lung linear atelectasis. Cardiac size is borderline. Pulmonary vascularity is normal. There are no definite infiltrates or effusions. There is no pneumothorax. There are atherosclerotic changes of the thoracic aorta. There is mild thoracolumbar levocurvature, probably positional. There is no significant change from prior exam.     SHALLOW INSPIRATORY PORTABLE CHEST RADIOGRAPH. MODERATE RIGHT HEMIDIAPHRAGMATIC ELEVATION AND RIGHT BASILAR ATELECTASIS.     Us Retroperitoneal Limited    Result Date: 10/26/2017  EXAMINATION: ULTRASOUND RETROPERITONEAL LIMITED (KIDNEYS) CLINICAL HISTORY:  RENAL FAILURE, ACUTE (KIDNEY INJURY)  N17.9 Acute renal failure superimposed on chronic kidney disease, on chronic dialysis, unspecified acute renal failure type ([address] ICD10 COMPARISONS:  NONE AVAILABLE TECHNIQUE: Transabdominal sector gray scale sonography. Sonographic imaging was performed by a registered sonographer and the images are submitted for interpretation. FINDINGS:  The right kidney measures 4.8 x 4.9 x 10.0cm. The left kidney measures 4.7 x 5.7 x 10.4cm. There is no hydronephrosis. No renal masses are identified. Renal parenchymal echotexture is within normal limits. NEGATIVE ULTRASOUND EXAMINATION OF THE KIDNEYS. Recent Labs      10/28/17   0611  10/29/17   0533   WBC  7.9  7.3   HGB  10.8*  12.0   PLT  160  176     Recent Labs      10/28/17   0611  10/29/17   0533  10/30/17   0938   NA  148*  147*  143   K  3.8  3.5  4.1   CL  111*  107  106   CO2  26  28  25   BUN  46*  23  14   CREATININE  1.03*  0.77  0.67   GLUCOSE  145*  102  161*     No results for input(s): BILITOT, ALKPHOS, AST, ALT in the last 72 hours.   Lab Results   Component Value Date    PROTIME 10.9 10/26/2017    INR 1.0 10/26/2017     No results found for: LITHIUM, DILFRTOT, VALPROATE    ASSESSMENT AND PLAN  Encephalopathy  Metabolic  Mostly Persian speaking but able to follow commands  Non focal neuro  Very stiff, PD features, add neupro patch for now  PT on case,   No agitaton  Discussed with nurse none No